# Patient Record
Sex: FEMALE | Race: WHITE | NOT HISPANIC OR LATINO | Employment: UNEMPLOYED | URBAN - METROPOLITAN AREA
[De-identification: names, ages, dates, MRNs, and addresses within clinical notes are randomized per-mention and may not be internally consistent; named-entity substitution may affect disease eponyms.]

---

## 2017-01-04 ENCOUNTER — HOSPITAL ENCOUNTER (EMERGENCY)
Facility: HOSPITAL | Age: 42
Discharge: HOME | End: 2017-01-05
Attending: EMERGENCY MEDICINE
Payer: COMMERCIAL

## 2017-01-04 DIAGNOSIS — K59.09 CHRONIC CONSTIPATION: Primary | ICD-10-CM

## 2017-01-05 VITALS
HEART RATE: 82 BPM | WEIGHT: 230 LBS | TEMPERATURE: 96.8 F | SYSTOLIC BLOOD PRESSURE: 130 MMHG | OXYGEN SATURATION: 99 % | DIASTOLIC BLOOD PRESSURE: 68 MMHG | RESPIRATION RATE: 18 BRPM

## 2017-01-05 LAB — HCG UR QL: NEGATIVE

## 2017-01-05 PROCEDURE — 81025 URINE PREGNANCY TEST: CPT | Performed by: EMERGENCY MEDICINE

## 2017-01-05 PROCEDURE — 99283 EMERGENCY DEPT VISIT LOW MDM: CPT

## 2018-04-26 ENCOUNTER — EVALUATION (OUTPATIENT)
Dept: PHYSICAL THERAPY | Facility: CLINIC | Age: 43
End: 2018-04-26
Payer: COMMERCIAL

## 2018-04-26 DIAGNOSIS — M25.372 UNSTABLE LEFT ANKLE: Primary | ICD-10-CM

## 2018-04-26 PROCEDURE — 97161 PT EVAL LOW COMPLEX 20 MIN: CPT | Performed by: PHYSICAL THERAPIST

## 2018-04-26 PROCEDURE — 97110 THERAPEUTIC EXERCISES: CPT | Performed by: PHYSICAL THERAPIST

## 2018-04-26 PROCEDURE — G8979 MOBILITY GOAL STATUS: HCPCS | Performed by: PHYSICAL THERAPIST

## 2018-04-26 PROCEDURE — G8978 MOBILITY CURRENT STATUS: HCPCS | Performed by: PHYSICAL THERAPIST

## 2018-04-26 RX ORDER — GABAPENTIN 100 MG/1
100 CAPSULE ORAL 3 TIMES DAILY
COMMUNITY
End: 2019-03-30

## 2018-04-26 NOTE — LETTER
2018    Carla Lawrence MD  [address] [address] [place] [id number]    Patient: Royal Lang   YOB: 1975   Date of Visit: 2018     Encounter Diagnosis     ICD-10-CM    1  Unstable left ankle M25 372        Dear Dr Isael Tanner:    Please review the attached Plan of Care from [address] recent visit  Please verify that you agree therapy should continue by signing the attached document and sending it back to our office  If you have any questions or concerns, please don't hesitate to call  Sincerely,    Raghavendra Rios, PT      Referring Provider:      I certify that I have read the below Plan of Care and certify the need for these services furnished under this plan of treatment while under my care  Carla Lawrence MD  [address] [place] [address] [place] [address] [place]: [phone]          PT Evaluation     Today's date: 2018  Patient name: Royal Lang  : 1975  MRN: [id number]  Referring provider: Shaun Martinez MD  Dx: No diagnosis found  Assessment  Impairments: abnormal or restricted ROM, impaired physical strength and pain with function    Assessment details: Pt is a 38 y o  female coming to outpatient PT with L ankle instability s/p L ankle surgery in 2018  Pt presents with decreased L ankle ROM; decreased LLE strength; gait and balance dysfunction; and overall decreased functional mobility  Pt would benefit from skilled PT services in order to address these deficits and reach maximum level of function  Thank you kindly for the referral!     Prognosis: good    Goals  ST  Pt will decrease pain levels by 3 points on NPS in 4 weeks  2  Pt will demonstrate improvement in LLE strength by 1/2 MMT grade in 4 weeks  3  Pt will be independent with HEP in 4 weeks  LT  Achieve FOTO score of 50/100 in 6 weeks  2  Pt will demonstrate non-antalgic gait mechanics in 6 weeks with LRAD     3  Pt will be able to return to work in 6-8 weeks with minimal to no limitations  Plan  Patient would benefit from: skilled PT  Planned modality interventions: cryotherapy  Planned therapy interventions: manual therapy, joint mobilization, neuromuscular re-education, patient education, postural training, therapeutic exercise, therapeutic activities, gait training, functional ROM exercises, balance and activity modification  Frequency: 2x week  Duration in weeks: 6        Subjective Evaluation    History of Present Illness  Date of surgery: 2018  Mechanism of injury: Pt reports she had L ankle surgery (drilled bone)  Pt reports she was NWB for 6 weeks and then went from boot to show, but hasn't bene able to go from boot to shoe  She reports she feels her shin bone feels like it's going to snap  She reports underneath her ankle bone bother her  Pt reports she feels like her foot locks - like she couldn't bend it  Pt has been ambulating with SPC for balance and for pain purposes  Pt has been in boot since  and wants her to keep boot on until next Friday (her next MD appt)  Performs stair climbing nonreciprocally  Pt is unable to work at this time due to her ankle surgery  She does , 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  Pt is on her feet a lot during the day    Pain  At best pain ratin  At worst pain ratin  Relieving factors: rest  Aggravating factors: standing, walking and stair climbing    Social Support  Stairs in house: yes     Treatments  No previous or current treatments  Patient Goals  Patient goals for therapy: decreased pain  Patient goal: to be able to walk        Objective     Observations     Additional Observation Details  Posture: pes planus noted b/l  Gait assessment: antalgic gait; decreased stance time on LLE; decreased heel strike/push off; pes planus; use of SPC  Palpation: increased tender to palpation to L lateral malleolus  Joint assessment: decreased TCJ mobility posterior on L compared to R        Active Range of Motion   Left Ankle/Foot   Dorsiflexion (ke): 5 degrees   Plantar flexion: 40 degrees   Inversion: 30 degrees   Eversion: 10 degrees     Right Ankle/Foot   Dorsiflexion (ke): 10 degrees   Plantar flexion: 32 degrees   Inversion: 30 degrees   Eversion: 20 degrees     Passive Range of Motion   Left Ankle/Foot    Dorsiflexion (ke): 7 degrees   Plantar flexion: 40 degrees   Inversion: 30 degrees   Eversion: 12 degrees     Right Ankle/Foot    Dorsiflexion (ke): 10 degrees   Plantar flexion: 35 degrees   Inversion: 30 degrees   Eversion: 25 degrees     Strength/Myotome Testing     Left Hip   Planes of Motion   Flexion: 4  Extension: 4    Right Hip   Planes of Motion   Flexion: 4  Extension: 4    Left Knee   Flexion: 4  Extension: 4    Right Knee   Flexion: 4  Extension: 4    Left Ankle/Foot   Dorsiflexion: 4  Plantar flexion: 4  Inversion: 4-  Eversion: 4-    Right Ankle/Foot   Dorsiflexion: 5  Plantar flexion: 5  Inversion: 4+  Eversion: 4+    General Comments     Ankle/Foot Comments   Functional tests:    SLS:  R: 5 seconds  L: 2 seconds - difficulty - pes planus noted        Daily Treatment Diary     DX: L ankle instability (s/p L ankle surgery Feb 2018 - in CAM boot until 5/4/18)  EPOC: 6/7/18  Precautions: none  CO-MORBIDITES: none  PERSONAL FACTORS: multiple previous ankle surgeries    Manual  4/26            L Ankle PROM             L TCJ post mobs                                                        Exercise Diary  4/26            Ankle Pumps HEP            Ankle Circles HEP            Ankle ABCs HEP            4-way ankle TB HEP otb            Seated BAPS: DF/PF; INV/EV             Seated HR/TR             Neutral arches             CC: soleus deceleration             SLS CAM boot            Clamshells             Supine bridging             Gastroc stretch: wedge Modalities  4/26            CP

## 2018-04-26 NOTE — PROGRESS NOTES
PT Evaluation     Today's date: 2018  Patient name: Teena Solano  : 1975  MRN: [id number]  Referring provider: Benny Ontiveros MD  Dx: No diagnosis found  Assessment  Impairments: abnormal or restricted ROM, impaired physical strength and pain with function    Assessment details: Pt is a 38 y o  female coming to outpatient PT with L ankle instability s/p L ankle surgery in 2018  Pt presents with decreased L ankle ROM; decreased LLE strength; gait and balance dysfunction; and overall decreased functional mobility  Pt would benefit from skilled PT services in order to address these deficits and reach maximum level of function  Thank you kindly for the referral!     Prognosis: good    Goals  ST  Pt will decrease pain levels by 3 points on NPS in 4 weeks  2  Pt will demonstrate improvement in LLE strength by 1/2 MMT grade in 4 weeks  3  Pt will be independent with HEP in 4 weeks  LT  Achieve FOTO score of 50/100 in 6 weeks  2  Pt will demonstrate non-antalgic gait mechanics in 6 weeks with LRAD  3  Pt will be able to return to work in 6-8 weeks with minimal to no limitations  Plan  Patient would benefit from: skilled PT  Planned modality interventions: cryotherapy  Planned therapy interventions: manual therapy, joint mobilization, neuromuscular re-education, patient education, postural training, therapeutic exercise, therapeutic activities, gait training, functional ROM exercises, balance and activity modification  Frequency: 2x week  Duration in weeks: 6        Subjective Evaluation    History of Present Illness  Date of surgery: 2018  Mechanism of injury: Pt reports she had L ankle surgery (drilled bone)  Pt reports she was NWB for 6 weeks and then went from boot to show, but hasn't bene able to go from boot to shoe  She reports she feels her shin bone feels like it's going to snap  She reports underneath her ankle bone bother her   Pt reports she feels like her foot locks - like she couldn't bend it  Pt has been ambulating with SPC for balance and for pain purposes  Pt has been in boot since  and wants her to keep boot on until next Friday (her next MD appt)  Performs stair climbing nonreciprocally  Pt is unable to work at this time due to her ankle surgery  She does , 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  Pt is on her feet a lot during the day    Pain  At best pain ratin  At worst pain ratin  Relieving factors: rest  Aggravating factors: standing, walking and stair climbing    Social Support  Stairs in house: yes     Treatments  No previous or current treatments  Patient Goals  Patient goals for therapy: decreased pain  Patient goal: to be able to walk        Objective     Observations     Additional Observation Details  Posture: pes planus noted b/l  Gait assessment: antalgic gait; decreased stance time on LLE; decreased heel strike/push off; pes planus; use of SPC  Palpation: increased tender to palpation to L lateral malleolus  Joint assessment: decreased TCJ mobility posterior on L compared to R        Active Range of Motion   Left Ankle/Foot   Dorsiflexion (ke): 5 degrees   Plantar flexion: 40 degrees   Inversion: 30 degrees   Eversion: 10 degrees     Right Ankle/Foot   Dorsiflexion (ke): 10 degrees   Plantar flexion: 32 degrees   Inversion: 30 degrees   Eversion: 20 degrees     Passive Range of Motion   Left Ankle/Foot    Dorsiflexion (ke): 7 degrees   Plantar flexion: 40 degrees   Inversion: 30 degrees   Eversion: 12 degrees     Right Ankle/Foot    Dorsiflexion (ke): 10 degrees   Plantar flexion: 35 degrees   Inversion: 30 degrees   Eversion: 25 degrees     Strength/Myotome Testing     Left Hip   Planes of Motion   Flexion: 4  Extension: 4    Right Hip   Planes of Motion   Flexion: 4  Extension: 4    Left Knee   Flexion: 4  Extension: 4    Right Knee   Flexion: 4  Extension: 4    Left Ankle/Foot Dorsiflexion: 4  Plantar flexion: 4  Inversion: 4-  Eversion: 4-    Right Ankle/Foot   Dorsiflexion: 5  Plantar flexion: 5  Inversion: 4+  Eversion: 4+    General Comments     Ankle/Foot Comments   Functional tests:    SLS:  R: 5 seconds  L: 2 seconds - difficulty - pes planus noted        Daily Treatment Diary     DX: L ankle instability (s/p L ankle surgery Feb 2018 - in CAM boot until 5/4/18)  EPOC: 6/7/18  Precautions: none  CO-MORBIDITES: none  PERSONAL FACTORS: multiple previous ankle surgeries    Manual  4/26            L Ankle PROM             L TCJ post mobs                                                        Exercise Diary  4/26            Ankle Pumps HEP            Ankle Circles HEP            Ankle ABCs HEP            4-way ankle TB HEP otb            Seated BAPS: DF/PF; INV/EV             Seated HR/TR             Neutral arches             CC: soleus deceleration             SLS CAM boot            Clamshells             Supine bridging             Gastroc stretch: wedge                                                                                                                         Modalities  4/26            CP

## 2018-04-27 ENCOUNTER — TRANSCRIBE ORDERS (OUTPATIENT)
Dept: PHYSICAL THERAPY | Facility: CLINIC | Age: 43
End: 2018-04-27

## 2018-04-27 DIAGNOSIS — M25.372 UNSTABLE LEFT ANKLE: Primary | ICD-10-CM

## 2018-05-01 ENCOUNTER — OFFICE VISIT (OUTPATIENT)
Dept: PHYSICAL THERAPY | Facility: CLINIC | Age: 43
End: 2018-05-01
Payer: COMMERCIAL

## 2018-05-01 DIAGNOSIS — M25.372 UNSTABLE LEFT ANKLE: Primary | ICD-10-CM

## 2018-05-01 PROCEDURE — 97140 MANUAL THERAPY 1/> REGIONS: CPT

## 2018-05-01 PROCEDURE — 97112 NEUROMUSCULAR REEDUCATION: CPT

## 2018-05-01 PROCEDURE — 97110 THERAPEUTIC EXERCISES: CPT

## 2018-05-01 NOTE — PROGRESS NOTES
Daily Note     Today's date: 2018  Patient name: Teresa Morris  : 1975  MRN: [id number]  Referring provider: Dillon Abbasi MD  Dx:   Encounter Diagnosis     ICD-10-CM    1  Unstable left ankle M25 372                   Subjective: Pt  Stated she is doing ok while ambulating in the boot, but she feels she has to shift her weight and keep it on her heel because when she WB through front of her foot she states it feels like a bone is going to break  Objective: See treatment diary below      Assessment: Tolerated treatment well  Patient exhibited good technique with therapeutic exercises and would benefit from continued PT  Pt  Strength in L ankle seems strong considering she has been in boot for length of time, but she is very diligent with HEP  ROM still limited  During EV stretching of L ankle pt  Gets a burning sensation  Tolerated TE's well, but pain with SLS if weight is towards forefoot or toes, she feels she has to WB on heel  Plan: Continue per plan of care  Progress treatment as tolerated        Daily Treatment Diary     DX: L ankle instability (s/p L ankle surgery 2018 - in CAM boot until 18)  EPOC: 18  Precautions: none  CO-MORBIDITES: none  PERSONAL FACTORS: multiple previous ankle surgeries    Manual             L Ankle PROM  10'           L TCJ post mobs  np                                                      Exercise Diary             Ankle Pumps HEP            Ankle Circles HEP            Ankle ABCs HEP reviewed           4-way ankle TB HEP otb gtb  15  ea           Seated BAPS: DF/PF; INV/EV  CW/CCW  15  Ea  L3           Seated HR/TR  20           Neutral arches  5"  x15           CC: soleus deceleration  np           SLS CAM boot np           Clamshells  otb  15x           Supine bridging  5"  x15           Gastroc stretch: wedge Modalities  4/26            CP

## 2018-05-03 ENCOUNTER — OFFICE VISIT (OUTPATIENT)
Dept: PHYSICAL THERAPY | Facility: CLINIC | Age: 43
End: 2018-05-03
Payer: COMMERCIAL

## 2018-05-03 DIAGNOSIS — M25.372 UNSTABLE LEFT ANKLE: Primary | ICD-10-CM

## 2018-05-03 PROCEDURE — 97110 THERAPEUTIC EXERCISES: CPT

## 2018-05-03 PROCEDURE — 97140 MANUAL THERAPY 1/> REGIONS: CPT

## 2018-05-03 PROCEDURE — 97112 NEUROMUSCULAR REEDUCATION: CPT

## 2018-05-03 NOTE — PROGRESS NOTES
Daily Note     Today's date: 5/3/2018  Patient name: Grace Gomez  : 1975  MRN: [id number]  Referring provider: Mitch Olson MD  Dx:   Encounter Diagnosis     ICD-10-CM    1  Unstable left ankle M25 372                   Subjective: Pt  Stated she is doing ok while ambulating in the boot, but she feels she has to shift her weight and keep it on her heel because when she WB through front of her foot she states it feels like a bone is going to break  Objective: See treatment diary below      Assessment: Tolerated treatment well  Patient exhibited good technique with therapeutic exercises and would benefit from continued PT  Pt  continues to show strength and range improvements  Pt  Tolerated TE's with minimal difficulty and no discomfort throughout, except for some burning with SLS in CAM boot  Will continue to progress as tolerated and monitor pt  response  Pt  To have follow up appointment with surgeon tomorrow  Plan: Continue per plan of care  Progress treatment as tolerated        Daily Treatment Diary     DX: L ankle instability (s/p L ankle surgery 2018 - in CAM boot until 18)  EPOC: 18  Precautions: none  CO-MORBIDITES: none  PERSONAL FACTORS: multiple previous ankle surgeries    Manual   53          L Ankle PROM  10' 9'          L TCJ post mobs  np np                                                     Exercise Diary  4/26 5/1 5/3          Bike   5'          Ankle Pumps HEP            Ankle Circles HEP            Ankle ABCs HEP reviewed full          4-way ankle TB HEP otb gtb  15  ea gtb  20 ea          Seated BAPS: DF/PF; INV/EV  CW/CCW  15  Ea  L3 20 ea  L4          Seated HR/TR  20 20          Neutral arches  5"  x15 10"x10          CC: soleus deceleration  np nv          SLS CAM boot np 10"x5          Clamshells  otb  15x otb  20x          Supine bridging  5"  x15 5"x15          Gastroc stretch: wedge Modalities  4/26            CP

## 2018-05-04 ENCOUNTER — APPOINTMENT (OUTPATIENT)
Dept: PHYSICAL THERAPY | Facility: CLINIC | Age: 43
End: 2018-05-04
Payer: COMMERCIAL

## 2018-05-08 ENCOUNTER — OFFICE VISIT (OUTPATIENT)
Dept: PHYSICAL THERAPY | Facility: CLINIC | Age: 43
End: 2018-05-08
Payer: COMMERCIAL

## 2018-05-08 DIAGNOSIS — M25.372 UNSTABLE LEFT ANKLE: Primary | ICD-10-CM

## 2018-05-08 PROCEDURE — 97140 MANUAL THERAPY 1/> REGIONS: CPT

## 2018-05-08 PROCEDURE — 97110 THERAPEUTIC EXERCISES: CPT

## 2018-05-08 PROCEDURE — 97112 NEUROMUSCULAR REEDUCATION: CPT

## 2018-05-08 NOTE — PROGRESS NOTES
Daily Note     Today's date: 2018  Patient name: Gary Flood  : 1975  MRN: [id number]  Referring provider: Jonny Rdz MD  Dx:   Encounter Diagnosis     ICD-10-CM    1  Unstable left ankle M25 372                   Subjective: Pt  Stated she has been more sore the last two days or so after MD d/c use of CAM boot and allowed to to progress to WB as tolerated  Objective: See treatment diary below      Assessment: Tolerated treatment fair  Patient exhibited good technique with therapeutic exercises and would benefit from continued PT  Pt  Complains of pain and soreness today  Able to complete TE's but with some discomfort t/o  Continued with Strengthening TE's and did not progress WB in session due to the increased pain when WB  Advised pt  That she should ween off of boot and still use if walking longer distanced for now, after experiencing the increased pain when out of boot  Pt  To have bone scan today ordered by MD    Plan: Continue per plan of care  Progress treatment as tolerated        Daily Treatment Diary     DX: L ankle instability (s/p L ankle surgery 2018 - in CAM boot until 18)  EPOC: 18  Precautions: none  CO-MORBIDITES: none  PERSONAL FACTORS: multiple previous ankle surgeries    Manual  4/26 5/1 5/3 5/8         L Ankle PROM  10' 9' 9'         L TCJ post mobs  np np np                                                    Exercise Diary  4/26 5/1 5/3 5/8         Bike   5' 5'         Ankle Pumps HEP            Ankle Circles HEP            Ankle ABCs HEP reviewed full          4-way ankle TB HEP otb gtb  15  ea gtb  20 ea btb  20  ea         Seated BAPS: DF/PF; INV/EV  CW/CCW  15  Ea  L3 20 ea  L4 20 ea  L4         Seated HR/TR  20 20 20         Neutral arches  5"  x15 10"x10 5"x15         CC: soleus deceleration  np nv 2W  L1  x10         SLS CAM boot np 10"x5 np         Clamshells  otb  15x otb  20x gtb  10         Supine bridging  5"  x15 5"x15 5"x15         Gastroc stretch: wedge   5"x10 5"x10                                                                                                                     Modalities  4/26            CP

## 2018-05-10 ENCOUNTER — APPOINTMENT (OUTPATIENT)
Dept: PHYSICAL THERAPY | Facility: CLINIC | Age: 43
End: 2018-05-10
Payer: COMMERCIAL

## 2018-05-14 ENCOUNTER — OFFICE VISIT (OUTPATIENT)
Dept: PHYSICAL THERAPY | Facility: CLINIC | Age: 43
End: 2018-05-14
Payer: COMMERCIAL

## 2018-05-14 DIAGNOSIS — M25.372 UNSTABLE LEFT ANKLE: Primary | ICD-10-CM

## 2018-05-14 PROCEDURE — 97112 NEUROMUSCULAR REEDUCATION: CPT

## 2018-05-14 PROCEDURE — 97140 MANUAL THERAPY 1/> REGIONS: CPT

## 2018-05-14 PROCEDURE — 97110 THERAPEUTIC EXERCISES: CPT

## 2018-05-14 NOTE — PROGRESS NOTES
Daily Note     Today's date: 2018  Patient name: Ebenezer Coleman  : 1975  MRN: [id number]  Referring provider: Tena Franco MD  Dx:   Encounter Diagnosis     ICD-10-CM    1  Unstable left ankle M25 372                   Subjective: Pt  Stated she has been more sore the last two days or so after MD d/c use of CAM boot and allowed to to progress to WB as tolerated  Objective: See treatment diary below      Assessment: Tolerated treatment fair  Patient exhibited good technique with therapeutic exercises and would benefit from continued PT  Pt  Complains of pain and soreness today  Able to complete TE's but with some discomfort t/o  Continued with Strengthening TE's and  progressed WB activities with fair tolerance  Pain usually occurs on lateral aspect of L ankle with WB, mostly during toe off  Will speak with PT about assessing fibular head movement upon NV  Plan: Continue per plan of care  Progress treatment as tolerated        Daily Treatment Diary     DX: L ankle instability (s/p L ankle surgery 2018 - in CAM boot until 18)  EPOC: 18  Precautions: none  CO-MORBIDITES: none  PERSONAL FACTORS: multiple previous ankle surgeries    Manual  4/26 5/1 5/3 5/8 5/14        L Ankle PROM  10' 9' 9' 8'        L TCJ post mobs  np np np                                                    Exercise Diary  4/26 5/1 5/3 5/8 5/14        Bike   5' 5' 6'        Ankle Pumps HEP            Ankle Circles HEP            Ankle ABCs HEP reviewed full          4-way ankle TB HEP otb gtb  15  ea gtb  20 ea btb  20  ea btb  20ea        Seated BAPS: DF/PF; INV/EV  CW/CCW  15  Ea  L3 20 ea  L4 20 ea  L4 20 ea  L4        Seated HR/TR  20 20 20 Stand  x15        Neutral arches  5"  x15 10"x10 5"x15 10"  x5        CC: soleus deceleration  np nv 2W  L1  x10 2W  L1  x25        SLS CAM boot np 10"x5 np 5"x5        Clamshells  otb  15x otb  20x gtb  10         Supine bridging  5"  x15 5"x15 5"x15         Gastroc stretch: wedge   5"x10 5"x10 15"x3        SLS     5"x5        Tandem walk     2 laps        Sidestepping     2 laps        Step ups fwd/lat     Up 6"  Lat 4"  x10        Step downs     4"  x10                                                   Modalities  4/26            CP

## 2018-05-17 ENCOUNTER — OFFICE VISIT (OUTPATIENT)
Dept: PHYSICAL THERAPY | Facility: CLINIC | Age: 43
End: 2018-05-17
Payer: COMMERCIAL

## 2018-05-17 DIAGNOSIS — M25.372 UNSTABLE LEFT ANKLE: Primary | ICD-10-CM

## 2018-05-17 PROCEDURE — 97110 THERAPEUTIC EXERCISES: CPT | Performed by: PHYSICAL THERAPIST

## 2018-05-17 PROCEDURE — 97112 NEUROMUSCULAR REEDUCATION: CPT | Performed by: PHYSICAL THERAPIST

## 2018-05-17 PROCEDURE — 97140 MANUAL THERAPY 1/> REGIONS: CPT | Performed by: PHYSICAL THERAPIST

## 2018-05-17 NOTE — PROGRESS NOTES
Daily Note     Today's date: 2018  Patient name: Mariella Teran  : 1975  MRN: [id number]  Referring provider: Bhanu Scruggs MD  Dx:   Encounter Diagnosis     ICD-10-CM    1  Unstable left ankle M25 372                   Subjective: Pt reports continuing to wear CAM boot at home per MD, but not here  She reports she just came form the gym - doing upper body exercises  She reports that she is feeling better today  Objective: See treatment diary below      Assessment: Tolerated treatment fair  Patient exhibited good technique with therapeutic exercises and would benefit from continued PT  performed TE with better tolerance during today's session  Continues to be limited in ankle ROM, but improving  Requires VC to maintaining neutral foot with frontal plane activities as pt has tendency to turn foot into ER  To see MD next Friday  Pt reported feeling pretty good leaving session  Plan: Continue per plan of care  Progress treatment as tolerated        Daily Treatment Diary     DX: L ankle instability (s/p L ankle surgery 2018 - in CAM boot outside of PT)  EPOC: 18  Precautions: none  CO-MORBIDITES: none  PERSONAL FACTORS: multiple previous ankle surgeries    Manual  4/26 5/1 5/3 5/8 5/14 5/17       L Ankle PROM  10' 9' 9' 8' 8'       L TCJ post mobs  np np np  2'                                                  Exercise Diary  4/26 5/1 5/3 5/8 5/14 5/17       Bike   5' 5' 6' 5'       Ankle Pumps HEP            Ankle Circles HEP            Ankle ABCs HEP reviewed full          4-way ankle TB HEP otb gtb  15  ea gtb  20 ea btb  20  ea btb  20ea btb  20ea       Seated BAPS: DF/PF; INV/EV  CW/CCW  15  Ea  L3 20 ea  L4 20 ea  L4 20 ea  L4 20 ea  L4       Seated HR/TR  20 20 20 Stand  x15 Stand  x15       Neutral arches  5"  x15 10"x10 5"x15 10"  x5 10"  x5       CC: soleus deceleration  np nv 2W  L1  x10 2W  L1  x25 2W  L1  x25       SLS CAM boot np 10"x5 np 5"x5 5"x5       Clamshells  otb  15x otb  20x gtb  10         Supine bridging  5"  x15 5"x15 5"x15         Gastroc stretch: wedge   5"x10 5"x10 15"x3 15"x3       Tandem walk     2 laps 2 laps       Sidestepping     2 laps 2 laps       Step ups fwd/lat     Up 6"  Lat 4"  x10 Up 6" lat 4" x10       Step downs     4"  x10 4" x10                                                  Modalities  4/26            CP

## 2018-05-22 ENCOUNTER — OFFICE VISIT (OUTPATIENT)
Dept: PHYSICAL THERAPY | Facility: CLINIC | Age: 43
End: 2018-05-22
Payer: COMMERCIAL

## 2018-05-22 DIAGNOSIS — M25.372 UNSTABLE LEFT ANKLE: Primary | ICD-10-CM

## 2018-05-22 PROCEDURE — 97140 MANUAL THERAPY 1/> REGIONS: CPT

## 2018-05-22 PROCEDURE — 97110 THERAPEUTIC EXERCISES: CPT

## 2018-05-22 PROCEDURE — 97112 NEUROMUSCULAR REEDUCATION: CPT

## 2018-05-22 NOTE — PROGRESS NOTES
Daily Note     Today's date: 2018  Patient name: Kat Ayala  : 1975  MRN: [id number]  Referring provider: Cata Garnett MD  Dx:   Encounter Diagnosis     ICD-10-CM    1  Unstable left ankle M25 372                   Subjective: Pt reports that she has had increased pain since last visit, which is starting to fade but still painful with WB even in CAM boot  Objective: See treatment diary below      Assessment: Tolerated treatment fair  Patient exhibited good technique with therapeutic exercises and would benefit from continued PT  Held WB exercises today, due to the increased pain still lingering since last visit  Continued and progressed seated and supine TE's and worked on PROM  JA PT performed mobilizations and reports restriction and pain of lateral talus glide and medial calcaneus glide  Plan: Continue per plan of care  Progress treatment as tolerated        Daily Treatment Diary     DX: L ankle instability (s/p L ankle surgery 2018 - in CAM boot outside of PT)  EPOC: 18  Precautions: none  CO-MORBIDITES: none  PERSONAL FACTORS: multiple previous ankle surgeries    Manual  4/26 5/1 5/3 5/8 5/14 5/17 5/22      L Ankle PROM  10' 9' 9' 8' 8' 8'      L TCJ post mobs  np np np  2' JA  PT                                                 Exercise Diary  4/26 5/1 5/3 5/8 5/14 5/17 5/22      Bike   5' 5' 6' 5' 6'      Ankle Pumps HEP            Ankle Circles HEP            Ankle ABCs HEP reviewed full          4-way ankle TB HEP otb gtb  15  ea gtb  20 ea btb  20  ea btb  20ea btb  20ea btb  25 ea      Seated BAPS: DF/PF; INV/EV  CW/CCW  15  Ea  L3 20 ea  L4 20 ea  L4 20 ea  L4 20 ea  L4 20 ea  L5      Seated HR/TR  20 20 20 Stand  x15 Stand  x15 Seat  x20      Neutral arches  5"  x15 10"x10 5"x15 10"  x5 10"  x5 10"  x10      CC: soleus deceleration  np nv 2W  L1  x10 2W  L1  x25 2W  L1  x25       SLS CAM boot np 10"x5 np 5"x5 5"x5       Clamshells  otb  15x otb  20x gtb  10 Supine bridging  5"  x15 5"x15 5"x15         Gastroc stretch: wedge   5"x10 5"x10 15"x3 15"x3       Tandem walk     2 laps 2 laps       Sidestepping     2 laps 2 laps       Step ups fwd/lat     Up 6"  Lat 4"  x10 Up 6" lat 4" x10       Step downs     4"  x10 4" x10                                                  Modalities  4/26            CP

## 2018-05-24 ENCOUNTER — OFFICE VISIT (OUTPATIENT)
Dept: PHYSICAL THERAPY | Facility: CLINIC | Age: 43
End: 2018-05-24
Payer: COMMERCIAL

## 2018-05-24 DIAGNOSIS — M25.372 UNSTABLE LEFT ANKLE: Primary | ICD-10-CM

## 2018-05-24 PROCEDURE — 97112 NEUROMUSCULAR REEDUCATION: CPT | Performed by: PHYSICAL THERAPIST

## 2018-05-24 PROCEDURE — G8978 MOBILITY CURRENT STATUS: HCPCS | Performed by: PHYSICAL THERAPIST

## 2018-05-24 PROCEDURE — 97140 MANUAL THERAPY 1/> REGIONS: CPT | Performed by: PHYSICAL THERAPIST

## 2018-05-24 PROCEDURE — 97110 THERAPEUTIC EXERCISES: CPT | Performed by: PHYSICAL THERAPIST

## 2018-05-24 PROCEDURE — G8979 MOBILITY GOAL STATUS: HCPCS | Performed by: PHYSICAL THERAPIST

## 2018-05-24 NOTE — PROGRESS NOTES
Daily Note / Discharge  *patient never returned to PT - patient D/C from PT*    Today's date: 2018  Patient name: Luz Persaud  : 1975  MRN: [id number]  Referring provider: Lisha Martinez MD  Dx:   Encounter Diagnosis     ICD-10-CM    1  Unstable left ankle M25 [id number]                   Subjective: Pt reports that she has been in more pain these last 2 weeks than prior to surgery  She reports that she pretty much wore her CAM boot all day yesterday  She reports that she is frustrated and feels like her surgery should be healing by now  Pt to see MD tomorrow to discuss  Reports most pain in achilles  Objective: See treatment diary below      Assessment: Tolerated treatment fair  Patient exhibited good technique with therapeutic exercises and would benefit from continued PT  Held WB exercises today, due to the increased pain in achilles  Tolerated seated TE well  Trialed IASTM over L achilles and gastroc with relief noted post  Monitor response nv  Plan: Continue per plan of care  Progress treatment as tolerated        Daily Treatment Diary     DX: L ankle instability (s/p L ankle surgery 2018 - in CAM boot outside of PT)  EPOC: 18  Precautions: none  CO-MORBIDITES: none  PERSONAL FACTORS: multiple previous ankle surgeries    Manual  4/26 5/1 5/3 5/8 5/14 5/17 5/22 5/24     L Ankle PROM  10' 9' 9' 8' 8' 8' 5'     L TCJ post mobs  np np np  2' JA  PT 3'     IASTM to L achilles        5'                                   Exercise Diary   5/3 5/8 5/14 5/17 5/22 5/24     Bike   5' 5' 6' 5' 6' 6'     Ankle Pumps HEP            Ankle Circles HEP            Ankle ABCs HEP reviewed full          4-way ankle TB HEP otb gtb  15  ea gtb  20 ea btb  20  ea btb  20ea btb  20ea btb  25 ea btb 25 ea     Seated BAPS: DF/PF; INV/EV  CW/CCW  15  Ea  L3 20 ea  L4 20 ea  L4 20 ea  L4 20 ea  L4 20 ea  L5 20 ea L5     Seated HR/TR  20 20 20 Stand  x15 Stand  x15 Seat  x20 seated x20     Neutral arches 5"  x15 10"x10 5"x15 10"  x5 10"  x5 10"  x10 10x10"     CC: soleus deceleration  np nv 2W  L1  x10 2W  L1  x25 2W  L1  x25       SLS CAM boot np 10"x5 np 5"x5 5"x5       Clamshells  otb  15x otb  20x gtb  10         Supine bridging  5"  x15 5"x15 5"x15         Gastroc stretch: wedge   5"x10 5"x10 15"x3 15"x3       Tandem walk     2 laps 2 laps       Sidestepping     2 laps 2 laps       Step ups fwd/lat     Up 6"  Lat 4"  x10 Up 6" lat 4" x10       Step downs     4"  x10 4" x10                                                  Modalities  4/26            CP

## 2018-05-29 ENCOUNTER — APPOINTMENT (OUTPATIENT)
Dept: PHYSICAL THERAPY | Facility: CLINIC | Age: 43
End: 2018-05-29
Payer: COMMERCIAL

## 2018-05-31 ENCOUNTER — APPOINTMENT (OUTPATIENT)
Dept: PHYSICAL THERAPY | Facility: CLINIC | Age: 43
End: 2018-05-31
Payer: COMMERCIAL

## 2019-02-02 ENCOUNTER — HOSPITAL ENCOUNTER (EMERGENCY)
Facility: HOSPITAL | Age: 44
Discharge: HOME | End: 2019-02-02
Attending: EMERGENCY MEDICINE | Admitting: EMERGENCY MEDICINE
Payer: COMMERCIAL

## 2019-02-02 ENCOUNTER — APPOINTMENT (EMERGENCY)
Dept: RADIOLOGY | Facility: HOSPITAL | Age: 44
End: 2019-02-02
Payer: COMMERCIAL

## 2019-02-02 VITALS
TEMPERATURE: 98.8 F | HEIGHT: 64 IN | HEART RATE: 94 BPM | BODY MASS INDEX: 41.83 KG/M2 | WEIGHT: 245 LBS | DIASTOLIC BLOOD PRESSURE: 87 MMHG | SYSTOLIC BLOOD PRESSURE: 135 MMHG | OXYGEN SATURATION: 98 % | RESPIRATION RATE: 16 BRPM

## 2019-02-02 DIAGNOSIS — S40.021A CONTUSION OF RIGHT UPPER EXTREMITY, INITIAL ENCOUNTER: Primary | ICD-10-CM

## 2019-02-02 PROCEDURE — 73130 X-RAY EXAM OF HAND: CPT

## 2019-02-02 PROCEDURE — 73110 X-RAY EXAM OF WRIST: CPT

## 2019-02-02 PROCEDURE — 99283 EMERGENCY DEPT VISIT LOW MDM: CPT

## 2019-02-02 PROCEDURE — 73090 X-RAY EXAM OF FOREARM: CPT

## 2019-02-02 RX ADMIN — IBUPROFEN 600 MG: 400 TABLET ORAL at 10:28

## 2019-02-02 NOTE — DISCHARGE INSTRUCTIONS
Contusion in Adults, Ambulatory Care   GENERAL INFORMATION:   A contusion  is a bruise that appears on your skin after an injury  A bruise happens when small blood vessels tear but skin does not  When blood vessels tear, blood leaks into nearby tissue, such as soft tissue or muscle  Common symptoms include the following:   · Pain that increases when you touch the bruise, walk, or use the area around the bruise    · Swelling or a lump at the site of the bruise or near it    · Red, blue, or black skin that may change to green or yellow after a few days    · Stiffness or problems moving the bruised area of your body  Seek immediate care for the following symptoms:   · New difficulty moving your injured area    · Tingling or numbness in or near the injured area    · Hand or foot below the bruise gets cold or turns pale  Treatment for a contusion  may include any of the following:  · NSAIDs  help decrease swelling and pain or fever  This medicine is available with or without a doctor's order  NSAIDs can cause stomach bleeding or kidney problems in certain people  If you take blood thinner medicine, always ask your healthcare provider if NSAIDs are safe for you  Always read the medicine label and follow directions  · Pain medicine  to decrease or take away pain  Do not wait until the pain is severe before you take your medicine  · Aspiration  to drain pooled blood in your muscle may be done to help prevent increased pressure in the muscle  · Surgery  may be done to repair a tear in the muscle or relieve pressure in the muscle caused by swelling  Care for a contusion:   · Rest the injured area  or use it less than usual  If you bruised your leg or foot, you may need crutches or a cane to help you walk  This will help you keep weight off your injured body part  Use crutches or a cane as directed  · Use ice  to decrease swelling and pain  Ice may also help prevent tissue damage   Use an ice pack, or put crushed ice in a plastic bag  Cover it with a towel and place it on your bruise for 15 to 20 minutes every hour or as directed  · Use Compression  An elastic bandage may be wrapped around a bruised muscle to support the area and decrease swelling  Make sure the bandage is not too tight  You should be able to fit 1 finger between the bandage and your skin  · Elevate (raise) your injured body part  above the level of your heart to help decrease pain and swelling  Use pillows, blankets, or rolled towels to elevate the area as often as you can  · Do not massage or use heat  Heat and massage may slow healing of the area  · Do not drink alcohol  Alcohol may slow healing of your injury  · Do not stretch injured muscles  Ask your healthcare provider when and how you may safely stretch after your injury  Prevent a contusion:   · Stretch and warm up before you play sports or exercise  · Wear protective gear when you play sports  Examples are shin guards and padding  · If you begin a new physical activity, start slowly to give your body a chance to adjust   Follow up with your healthcare provider as directed:  Write down your questions so you remember to ask them during your visits  CARE AGREEMENT:   You have the right to help plan your care  Learn about your health condition and how it may be treated  Discuss treatment options with your caregivers to decide what care you want to receive  You always have the right to refuse treatment  The above information is an  only  It is not intended as medical advice for individual conditions or treatments  Talk to your doctor, nurse or pharmacist before following any medical regimen to see if it is safe and effective for you  © 2014 [address] is for End User's use only and may not be sold, redistributed or otherwise used for commercial purposes   All illustrations and images included in CareNotes® are the copyrighted property of [employer]  or Will Cha

## 2019-02-02 NOTE — ED PROVIDER NOTES
History  Chief Complaint   Patient presents with    Hand Injury     fall around 5 Pm yesterday, landed on the right arm and having pain in the right hand  64-year-old female presents for evaluation right hand and wrist pain status post fall 1 day prior  Patient reports around 5:00 p m  Slipping on ice and falling directly onto her right hand and forearm  Patient out swelling of the lateral aspect of the right hand and pain of the wrist and forearm  Pain is currently 7/10  No paresthesias, no other injury noted, no head contusion or LOC  History provided by:  Patient   used: No        Prior to Admission Medications   Prescriptions Last Dose Informant Patient Reported? Taking? Celecoxib (CELEBREX PO) Not Taking at Unknown time  Yes No   Sig: Take by mouth   gabapentin (NEURONTIN) 100 mg capsule Not Taking at Unknown time  Yes No   Sig: Take 100 mg by mouth 3 (three) times a day      Facility-Administered Medications: None       History reviewed  No pertinent past medical history  Past Surgical History:   Procedure Laterality Date    ANKLE ARTHROSCOPY      had 4 ankle surgeries on L foot; 2010; Oct 2017; Feb 2018    ANKLE LIGAMENT RECONSTRUCTION Right     CHOLECYSTECTOMY      HEEL SPUR EXCISION Right 2007    TUBAL LIGATION         Family History   Problem Relation Age of Onset    Cancer Father      I have reviewed and agree with the history as documented  Social History   Substance Use Topics    Smoking status: Current Every Day Smoker     Packs/day: 0 25    Smokeless tobacco: Never Used    Alcohol use No        Review of Systems   Constitutional: Negative for chills and fever  HENT: Negative for rhinorrhea and sore throat  Eyes: Negative for visual disturbance  Respiratory: Negative for cough and shortness of breath  Cardiovascular: Negative for chest pain and leg swelling  Gastrointestinal: Negative for abdominal pain, diarrhea, nausea and vomiting  Genitourinary: Negative for dysuria  Musculoskeletal: Negative for back pain and myalgias  Right hand, forearm and wrist pain   Skin: Negative for rash  Neurological: Negative for dizziness and headaches  Psychiatric/Behavioral: Negative for confusion  All other systems reviewed and are negative  Physical Exam  Physical Exam   Constitutional: She is oriented to person, place, and time  She appears well-developed and well-nourished  HENT:   Nose: Nose normal    Mouth/Throat: Oropharynx is clear and moist  No oropharyngeal exudate  Eyes: Pupils are equal, round, and reactive to light  Conjunctivae and EOM are normal  No scleral icterus  Neck: Normal range of motion  Neck supple  No JVD present  No tracheal deviation present  Cardiovascular: Normal rate, regular rhythm and normal heart sounds  No murmur heard  Pulmonary/Chest: Effort normal and breath sounds normal  No respiratory distress  She has no wheezes  She has no rales  Abdominal: Soft  Bowel sounds are normal  There is no tenderness  There is no guarding  Musculoskeletal: Normal range of motion  She exhibits edema and tenderness (Right lateral hand, wrist and forearm)  Neurological: She is alert and oriented to person, place, and time  No cranial nerve deficit or sensory deficit  She exhibits normal muscle tone  5/5 motor, nl sens   Skin: Skin is warm and dry  Psychiatric: She has a normal mood and affect  Her behavior is normal    Nursing note and vitals reviewed        Vital Signs  ED Triage Vitals [02/02/19 0953]   Temperature Pulse Respirations Blood Pressure SpO2   98 8 °F (37 1 °C) 94 16 135/87 98 %      Temp Source Heart Rate Source Patient Position - Orthostatic VS BP Location FiO2 (%)   Tympanic -- Sitting Left arm --      Pain Score       5           Vitals:    02/02/19 0953   BP: 135/87   Pulse: 94   Patient Position - Orthostatic VS: Sitting       Visual Acuity      ED Medications  Medications   ibuprofen (MOTRIN) tablet 600 mg (600 mg Oral Given 2/2/19 1028)       Diagnostic Studies  Results Reviewed     None                 XR forearm 2 views RIGHT   ED Interpretation by Kerney Sandhoff, DO (02/02 1027)   No fracture or dislocation      XR hand 3+ views RIGHT   ED Interpretation by Kerney Sandhoff, DO (02/02 1027)   No fracture or dislocation      XR wrist 3+ views RIGHT   ED Interpretation by Kerney Sandhoff, DO (02/02 1027)   no fracture or dislocation                 Procedures  Procedures       Phone Contacts  ED Phone Contact    ED Course  ED Course as of Feb 02 1041   Sat Feb 02, 2019   1002 Patient seen examined at bedside  Imaging ordered  1026 Imaging reviewed, no acute fracture dislocation noted  Motrin 600 mg given  Instructed patient on rest, ice, elevation and NSAIDs as needed  1027 Discussed with patient discharge plan and instructions  Patient to follow up with primary care physician as an outpatient  Patient to return to ED if any change or worsening condition: increased pain, new onset fever or bleeding  MDM    Disposition  Final diagnoses:   Contusion of right upper extremity, initial encounter     Time reflects when diagnosis was documented in both MDM as applicable and the Disposition within this note     Time User Action Codes Description Comment    2/2/2019 10:28 AM Freddy Velasco Add [S40 021A] Contusion of right upper extremity, initial encounter       ED Disposition     ED Disposition Condition Date/Time Comment    Discharge  Sat Feb 2, 2019 10:28 AM Kylah Cameron discharge to home/self care      Condition at discharge: Stable        Follow-up Information     Follow up With Specialties Details Why Contact Info    PMD  In 2 days      [address] Emergency Department Emergency Medicine  As needed, If symptoms worsen [address] [facility]  [phone]          Discharge Medication List as of 2/2/2019 10:29 AM CONTINUE these medications which have NOT CHANGED    Details   Celecoxib (CELEBREX PO) Take by mouth, Historical Med      gabapentin (NEURONTIN) 100 mg capsule Take 100 mg by mouth 3 (three) times a day, Historical Med           No discharge procedures on file      ED Provider  Electronically Signed by           Darline Russell DO  02/02/19 1047

## 2019-02-18 ENCOUNTER — APPOINTMENT (EMERGENCY)
Dept: RADIOLOGY | Facility: HOSPITAL | Age: 44
End: 2019-02-18
Payer: COMMERCIAL

## 2019-02-18 ENCOUNTER — HOSPITAL ENCOUNTER (EMERGENCY)
Facility: HOSPITAL | Age: 44
Discharge: HOME | End: 2019-02-18
Attending: EMERGENCY MEDICINE | Admitting: EMERGENCY MEDICINE
Payer: COMMERCIAL

## 2019-02-18 VITALS
HEIGHT: 64 IN | OXYGEN SATURATION: 98 % | RESPIRATION RATE: 16 BRPM | BODY MASS INDEX: 41.83 KG/M2 | SYSTOLIC BLOOD PRESSURE: 132 MMHG | TEMPERATURE: 99.3 F | HEART RATE: 86 BPM | DIASTOLIC BLOOD PRESSURE: 82 MMHG | WEIGHT: 245 LBS

## 2019-02-18 DIAGNOSIS — J40 BRONCHITIS: Primary | ICD-10-CM

## 2019-02-18 DIAGNOSIS — R05.9 COUGH: ICD-10-CM

## 2019-02-18 LAB — EXT PREG TEST URINE: NEGATIVE

## 2019-02-18 PROCEDURE — 94760 N-INVAS EAR/PLS OXIMETRY 1: CPT

## 2019-02-18 PROCEDURE — 99285 EMERGENCY DEPT VISIT HI MDM: CPT

## 2019-02-18 PROCEDURE — 94640 AIRWAY INHALATION TREATMENT: CPT

## 2019-02-18 PROCEDURE — 71046 X-RAY EXAM CHEST 2 VIEWS: CPT

## 2019-02-18 PROCEDURE — 81025 URINE PREGNANCY TEST: CPT | Performed by: EMERGENCY MEDICINE

## 2019-02-18 RX ORDER — IPRATROPIUM BROMIDE AND ALBUTEROL SULFATE 2.5; .5 MG/3ML; MG/3ML
3 SOLUTION RESPIRATORY (INHALATION) ONCE
Status: COMPLETED | OUTPATIENT
Start: 2019-02-18 | End: 2019-02-18

## 2019-02-18 RX ORDER — BENZONATATE 200 MG/1
200 CAPSULE ORAL 3 TIMES DAILY PRN
Qty: 20 CAPSULE | Refills: 0 | Status: SHIPPED | OUTPATIENT
Start: 2019-02-18 | End: 2019-03-30

## 2019-02-18 RX ORDER — LEVOFLOXACIN 500 MG/1
500 TABLET, FILM COATED ORAL DAILY
Qty: 10 TABLET | Refills: 0 | Status: SHIPPED | OUTPATIENT
Start: 2019-02-18 | End: 2019-02-28

## 2019-02-18 RX ADMIN — IPRATROPIUM BROMIDE AND ALBUTEROL SULFATE 3 ML: .5; 3 SOLUTION RESPIRATORY (INHALATION) at 14:13

## 2019-02-18 NOTE — ED PROVIDER NOTES
History  Chief Complaint   Patient presents with    Shortness of Breath     was dx with pneumonia one month ago given prednisone, zithromax and albuterol inhaler   Cough     dry, sore throat from coughing  worsened two days ago      Patient reports shortness of breath coughing and possible pneumonia  Patient was seen and treated clinically for pneumonia approximately 1 month ago at the that the mycin on steroids  Patient is felt to get better and in fact in the last 2 days has gotten worse with coughing and shortness of breath and wheezing  Patient is a smoker and has slowed her smoking since becoming ill  History provided by:  Patient      Prior to Admission Medications   Prescriptions Last Dose Informant Patient Reported? Taking? Celecoxib (CELEBREX PO) Not Taking at Unknown time  Yes No   Sig: Take by mouth   gabapentin (NEURONTIN) 100 mg capsule Not Taking at Unknown time  Yes No   Sig: Take 100 mg by mouth 3 (three) times a day      Facility-Administered Medications: None       Past Medical History:   Diagnosis Date    Bronchitis     Pneumonia        Past Surgical History:   Procedure Laterality Date    ANKLE ARTHROSCOPY      had 4 ankle surgeries on L foot; 2010; Oct 2017; Feb 2018    ANKLE LIGAMENT RECONSTRUCTION Right     CHOLECYSTECTOMY      HEEL SPUR EXCISION Right 2007    TUBAL LIGATION         Family History   Problem Relation Age of Onset    Cancer Father      I have reviewed and agree with the history as documented  Social History     Tobacco Use    Smoking status: Current Every Day Smoker     Packs/day: 0 25    Smokeless tobacco: Never Used   Substance Use Topics    Alcohol use: No    Drug use: No        Review of Systems   Constitutional: Negative for chills and fever  HENT: Negative for rhinorrhea and sore throat  Eyes: Negative for visual disturbance  Respiratory: Positive for cough, shortness of breath and wheezing      Cardiovascular: Negative for chest pain and leg swelling  Gastrointestinal: Negative for abdominal pain, diarrhea, nausea and vomiting  Genitourinary: Negative for dysuria  Musculoskeletal: Negative for back pain and myalgias  Skin: Negative for rash  Neurological: Negative for dizziness and headaches  Psychiatric/Behavioral: Negative for confusion  All other systems reviewed and are negative  Physical Exam  Physical Exam   Constitutional: She is oriented to person, place, and time  She appears well-developed and well-nourished  HENT:   Nose: Nose normal    Mouth/Throat: Oropharynx is clear and moist  No oropharyngeal exudate  Eyes: Pupils are equal, round, and reactive to light  Conjunctivae and EOM are normal  No scleral icterus  Neck: Normal range of motion  Neck supple  No JVD present  No tracheal deviation present  Cardiovascular: Normal rate, regular rhythm and normal heart sounds  No murmur heard  Pulmonary/Chest: Effort normal  No accessory muscle usage or stridor  No apnea, no tachypnea and no bradypnea  No respiratory distress  She has decreased breath sounds  She has wheezes  She has no rhonchi  She has no rales  Abdominal: Soft  Bowel sounds are normal  There is no tenderness  There is no guarding  Musculoskeletal: Normal range of motion  She exhibits no edema or tenderness  Neurological: She is alert and oriented to person, place, and time  No cranial nerve deficit or sensory deficit  She exhibits normal muscle tone  5/5 motor, nl sens   Skin: Skin is warm and dry  Psychiatric: She has a normal mood and affect  Her behavior is normal    Nursing note and vitals reviewed        Vital Signs  ED Triage Vitals [02/18/19 1344]   Temperature Pulse Respirations Blood Pressure SpO2   99 1 °F (37 3 °C) 90 18 146/59 96 %      Temp Source Heart Rate Source Patient Position - Orthostatic VS BP Location FiO2 (%)   Tympanic Monitor Sitting Left arm --      Pain Score       7           Vitals:    02/18/19 1344 02/18/19 1524   BP: 146/59 132/82   Pulse: 90 86   Patient Position - Orthostatic VS: Sitting Sitting       Visual Acuity      ED Medications  Medications   ipratropium-albuterol (DUO-NEB) 0 5-2 5 mg/3 mL inhalation solution 3 mL (3 mL Nebulization Given 2/18/19 1413)       Diagnostic Studies  Results Reviewed     Procedure Component Value Units Date/Time    POCT pregnancy, urine [[id number]]  (Normal) Resulted:  02/18/19 1431    Lab Status:  Final result Updated:  02/18/19 1431     EXT PREG TEST UR (Ref: Negative) negative                 XR chest 2 views   ED Interpretation by Rosalia Thorne DO (02/18 1446)   Two views of the chest reviewed by me  There is soft tissue seen LLL at base, no infiltrate and no effusions seen  Final Result by Sergio Bteh MD (02/18 1540)      No acute airspace consolidation   Nonspecific prominence of the lung markings, may be due to hypoinflation   No congestion seen            Workstation performed: [id number]                    Procedures  Procedures       Phone Contacts  ED Phone Contact    ED Course  ED Course as of Feb 18 1553   Mon Feb 18, 2019   1513 Results reviewed with patient  Plan of treatment with Levaquin and Tessalon discussed  Patient will follow up her primary care provider, Dr Howard Galdamez  MDM    Disposition  Final diagnoses:   Bronchitis   Cough     Time reflects when diagnosis was documented in both MDM as applicable and the Disposition within this note     Time User Action Codes Description Comment    2/18/2019  3:14 PM Bárbara Gould Add [J40] Bronchitis     2/18/2019  3:14 PM Bárbara Gould Add [R05] Cough       ED Disposition     ED Disposition Condition Date/Time Comment    Discharge Stable Mon Feb 18, 2019  3:14 PM Laxmi Shepard discharge to home/self care              Follow-up Information     Follow up With Specialties Details Why Annel Senior MD [facility] Schedule an appointment as soon as possible for a visit in 3 days  [facility]            Discharge Medication List as of 2/18/2019  3:21 PM      START taking these medications    Details   benzonatate (TESSALON) 200 MG capsule Take 1 capsule (200 mg total) by mouth 3 (three) times a day as needed for cough, Starting Mon 2/18/2019, Normal      levofloxacin (LEVAQUIN) 500 mg tablet Take 1 tablet (500 mg total) by mouth daily for 10 days, Starting Mon 2/18/2019, Until Thu 2/28/2019, Normal         CONTINUE these medications which have NOT CHANGED    Details   Celecoxib (CELEBREX PO) Take by mouth, Historical Med      gabapentin (NEURONTIN) 100 mg capsule Take 100 mg by mouth 3 (three) times a day, Historical Med           No discharge procedures on file      ED Provider  Electronically Signed by           Terri Villavicencio DO  02/18/19 7354

## 2019-03-21 ENCOUNTER — TRANSCRIBE ORDERS (OUTPATIENT)
Dept: ADMINISTRATIVE | Facility: HOSPITAL | Age: 44
End: 2019-03-21

## 2019-03-21 DIAGNOSIS — Z12.31 VISIT FOR SCREENING MAMMOGRAM: Primary | ICD-10-CM

## 2019-03-30 ENCOUNTER — HOSPITAL ENCOUNTER (EMERGENCY)
Facility: HOSPITAL | Age: 44
End: 2019-03-31
Attending: EMERGENCY MEDICINE
Payer: COMMERCIAL

## 2019-03-30 DIAGNOSIS — N93.8 DYSFUNCTIONAL UTERINE BLEEDING: Primary | ICD-10-CM

## 2019-03-30 PROCEDURE — 85610 PROTHROMBIN TIME: CPT | Performed by: EMERGENCY MEDICINE

## 2019-03-30 PROCEDURE — 36415 COLL VENOUS BLD VENIPUNCTURE: CPT | Performed by: EMERGENCY MEDICINE

## 2019-03-30 PROCEDURE — 99284 EMERGENCY DEPT VISIT MOD MDM: CPT

## 2019-03-30 PROCEDURE — 85730 THROMBOPLASTIN TIME PARTIAL: CPT | Performed by: EMERGENCY MEDICINE

## 2019-03-30 PROCEDURE — 85025 COMPLETE CBC W/AUTO DIFF WBC: CPT | Performed by: EMERGENCY MEDICINE

## 2019-03-30 RX ORDER — MEDROXYPROGESTERONE ACETATE 10 MG/1
10 TABLET ORAL DAILY
Status: ON HOLD | COMMUNITY
End: 2019-04-01 | Stop reason: SDUPTHER

## 2019-03-30 RX ORDER — GABAPENTIN 300 MG/1
300 CAPSULE ORAL 2 TIMES DAILY PRN
COMMUNITY

## 2019-03-31 ENCOUNTER — HOSPITAL ENCOUNTER (OUTPATIENT)
Facility: HOSPITAL | Age: 44
Setting detail: OBSERVATION
Discharge: HOME | End: 2019-04-01
Attending: OBSTETRICS & GYNECOLOGY | Admitting: OBSTETRICS & GYNECOLOGY
Payer: COMMERCIAL

## 2019-03-31 VITALS
HEART RATE: 84 BPM | SYSTOLIC BLOOD PRESSURE: 112 MMHG | DIASTOLIC BLOOD PRESSURE: 61 MMHG | RESPIRATION RATE: 16 BRPM | HEIGHT: 64 IN | WEIGHT: 239 LBS | TEMPERATURE: 98.7 F | OXYGEN SATURATION: 99 % | BODY MASS INDEX: 40.8 KG/M2

## 2019-03-31 DIAGNOSIS — N92.0 MENORRHAGIA WITH REGULAR CYCLE: Primary | ICD-10-CM

## 2019-03-31 PROBLEM — D64.9 SYMPTOMATIC ANEMIA: Status: ACTIVE | Noted: 2019-03-31

## 2019-03-31 LAB
ABO GROUP BLD: NORMAL
APTT PPP: 31 SECONDS (ref 26–38)
BACTERIA UR QL AUTO: ABNORMAL /HPF
BASOPHILS # BLD AUTO: 0.03 THOUSANDS/ΜL (ref 0–0.1)
BASOPHILS # BLD AUTO: 0.1 THOUSANDS/ΜL (ref 0–0.1)
BASOPHILS NFR BLD AUTO: 0 % (ref 0–1)
BASOPHILS NFR BLD AUTO: 1 % (ref 0–1)
BILIRUB UR QL STRIP: NEGATIVE
BLD GP AB SCN SERPL QL: NEGATIVE
CLARITY UR: CLEAR
COLOR UR: ABNORMAL
EOSINOPHIL # BLD AUTO: 0.19 THOUSAND/ΜL (ref 0–0.61)
EOSINOPHIL # BLD AUTO: 0.2 THOUSAND/ΜL (ref 0–0.61)
EOSINOPHIL NFR BLD AUTO: 3 % (ref 0–6)
EOSINOPHIL NFR BLD AUTO: 3 % (ref 0–6)
ERYTHROCYTE [DISTWIDTH] IN BLOOD BY AUTOMATED COUNT: 15.2 % (ref 11.6–15.1)
ERYTHROCYTE [DISTWIDTH] IN BLOOD BY AUTOMATED COUNT: 16.1 % (ref 11.6–15.1)
EXT PREG TEST URINE: NEGATIVE
GLUCOSE UR STRIP-MCNC: NEGATIVE MG/DL
HCT VFR BLD AUTO: 22.4 % (ref 37–47)
HCT VFR BLD AUTO: 26.4 % (ref 34.8–46.1)
HGB BLD-MCNC: 7.5 G/DL (ref 11.5–15.4)
HGB BLD-MCNC: 7.9 G/DL (ref 11.5–15.4)
HGB UR QL STRIP.AUTO: ABNORMAL
HYPERCHROMIA BLD QL SMEAR: PRESENT
IMM GRANULOCYTES # BLD AUTO: 0.06 THOUSAND/UL (ref 0–0.2)
IMM GRANULOCYTES NFR BLD AUTO: 1 % (ref 0–2)
INR PPP: 1 (ref 0.9–1.5)
KETONES UR STRIP-MCNC: NEGATIVE MG/DL
LEUKOCYTE ESTERASE UR QL STRIP: NEGATIVE
LYMPHOCYTES # BLD AUTO: 2.73 THOUSANDS/ΜL (ref 0.6–4.47)
LYMPHOCYTES # BLD AUTO: 3 THOUSANDS/ΜL (ref 0.6–4.47)
LYMPHOCYTES NFR BLD AUTO: 39 % (ref 14–44)
LYMPHOCYTES NFR BLD AUTO: 43 % (ref 14–44)
MCH RBC QN AUTO: 23.7 PG (ref 26.8–34.3)
MCH RBC QN AUTO: 24.9 PG (ref 26.8–34.3)
MCHC RBC AUTO-ENTMCNC: 29.9 G/DL (ref 31.4–37.4)
MCHC RBC AUTO-ENTMCNC: 33.4 G/DL (ref 31.4–37.4)
MCV RBC AUTO: 74 FL (ref 82–98)
MCV RBC AUTO: 79 FL (ref 82–98)
MICROCYTES BLD QL AUTO: PRESENT
MONOCYTES # BLD AUTO: 0.5 THOUSAND/ΜL (ref 0.17–1.22)
MONOCYTES # BLD AUTO: 0.54 THOUSAND/ΜL (ref 0.17–1.22)
MONOCYTES NFR BLD AUTO: 7 % (ref 4–12)
MONOCYTES NFR BLD AUTO: 8 % (ref 4–12)
NEUTROPHILS # BLD AUTO: 3.3 THOUSANDS/ΜL (ref 1.85–7.62)
NEUTROPHILS # BLD AUTO: 3.55 THOUSANDS/ΜL (ref 1.85–7.62)
NEUTS SEG NFR BLD AUTO: 47 % (ref 43–75)
NEUTS SEG NFR BLD AUTO: 49 % (ref 43–75)
NITRITE UR QL STRIP: NEGATIVE
NON-SQ EPI CELLS URNS QL MICRO: ABNORMAL /HPF
NRBC BLD AUTO-RTO: 0 /100 WBCS
OVALOCYTES BLD QL SMEAR: PRESENT
PH UR STRIP.AUTO: 6.5 [PH]
PLATELET # BLD AUTO: 314 THOUSANDS/UL (ref 149–390)
PLATELET # BLD AUTO: 368 THOUSANDS/UL (ref 149–390)
PLATELET BLD QL SMEAR: ADEQUATE
PMV BLD AUTO: 7.9 FL (ref 8.9–12.7)
PMV BLD AUTO: 9.6 FL (ref 8.9–12.7)
PROT UR STRIP-MCNC: NEGATIVE MG/DL
PROTHROMBIN TIME: 11.7 SECONDS (ref 10.2–13)
RBC # BLD AUTO: 3.01 MILLION/UL (ref 3.81–5.12)
RBC # BLD AUTO: 3.33 MILLION/UL (ref 3.81–5.12)
RBC #/AREA URNS AUTO: ABNORMAL /HPF
RBC MORPH BLD: PRESENT
RH BLD: POSITIVE
SP GR UR STRIP.AUTO: <=1.005 (ref 1–1.03)
SPECIMEN EXPIRATION DATE: NORMAL
UROBILINOGEN UR QL STRIP.AUTO: 0.2 E.U./DL
WBC # BLD AUTO: 7.1 THOUSAND/UL (ref 4.31–10.16)
WBC # BLD AUTO: 7.1 THOUSAND/UL (ref 4.31–10.16)
WBC #/AREA URNS AUTO: ABNORMAL /HPF

## 2019-03-31 PROCEDURE — 81001 URINALYSIS AUTO W/SCOPE: CPT | Performed by: EMERGENCY MEDICINE

## 2019-03-31 PROCEDURE — 86850 RBC ANTIBODY SCREEN: CPT | Performed by: STUDENT IN AN ORGANIZED HEALTH CARE EDUCATION/TRAINING PROGRAM

## 2019-03-31 PROCEDURE — 81025 URINE PREGNANCY TEST: CPT | Performed by: EMERGENCY MEDICINE

## 2019-03-31 PROCEDURE — 86920 COMPATIBILITY TEST SPIN: CPT

## 2019-03-31 PROCEDURE — P9040 RBC LEUKOREDUCED IRRADIATED: HCPCS

## 2019-03-31 PROCEDURE — 86900 BLOOD TYPING SEROLOGIC ABO: CPT | Performed by: STUDENT IN AN ORGANIZED HEALTH CARE EDUCATION/TRAINING PROGRAM

## 2019-03-31 PROCEDURE — 86901 BLOOD TYPING SEROLOGIC RH(D): CPT | Performed by: STUDENT IN AN ORGANIZED HEALTH CARE EDUCATION/TRAINING PROGRAM

## 2019-03-31 PROCEDURE — 81003 URINALYSIS AUTO W/O SCOPE: CPT | Performed by: EMERGENCY MEDICINE

## 2019-03-31 PROCEDURE — 85025 COMPLETE CBC W/AUTO DIFF WBC: CPT | Performed by: STUDENT IN AN ORGANIZED HEALTH CARE EDUCATION/TRAINING PROGRAM

## 2019-03-31 PROCEDURE — G0379 DIRECT REFER HOSPITAL OBSERV: HCPCS

## 2019-03-31 RX ORDER — GABAPENTIN 300 MG/1
300 CAPSULE ORAL 2 TIMES DAILY
Status: DISCONTINUED | OUTPATIENT
Start: 2019-03-31 | End: 2019-04-01 | Stop reason: HOSPADM

## 2019-03-31 RX ORDER — MEDROXYPROGESTERONE ACETATE 5 MG/1
10 TABLET ORAL DAILY
Status: DISCONTINUED | OUTPATIENT
Start: 2019-03-31 | End: 2019-03-31

## 2019-03-31 RX ORDER — ACETAMINOPHEN 325 MG/1
650 TABLET ORAL EVERY 6 HOURS PRN
Status: DISCONTINUED | OUTPATIENT
Start: 2019-03-31 | End: 2019-04-01 | Stop reason: HOSPADM

## 2019-03-31 RX ADMIN — MEDROXYPROGESTERONE ACETATE 10 MG: 5 TABLET ORAL at 15:13

## 2019-03-31 RX ADMIN — ACETAMINOPHEN 650 MG: 325 TABLET ORAL at 13:26

## 2019-04-01 VITALS
DIASTOLIC BLOOD PRESSURE: 76 MMHG | HEART RATE: 79 BPM | TEMPERATURE: 97.6 F | RESPIRATION RATE: 16 BRPM | SYSTOLIC BLOOD PRESSURE: 119 MMHG | OXYGEN SATURATION: 98 %

## 2019-04-01 LAB
ABO GROUP BLD BPU: NORMAL
BASOPHILS # BLD AUTO: 0.03 THOUSANDS/ΜL (ref 0–0.1)
BASOPHILS NFR BLD AUTO: 0 % (ref 0–1)
BPU ID: NORMAL
CROSSMATCH: NORMAL
EOSINOPHIL # BLD AUTO: 0.21 THOUSAND/ΜL (ref 0–0.61)
EOSINOPHIL NFR BLD AUTO: 3 % (ref 0–6)
ERYTHROCYTE [DISTWIDTH] IN BLOOD BY AUTOMATED COUNT: 15.4 % (ref 11.6–15.1)
ERYTHROCYTE [DISTWIDTH] IN BLOOD BY AUTOMATED COUNT: 15.7 % (ref 11.6–15.1)
HCT VFR BLD AUTO: 27 % (ref 34.8–46.1)
HCT VFR BLD AUTO: 27.4 % (ref 34.8–46.1)
HGB BLD-MCNC: 8.4 G/DL (ref 11.5–15.4)
HGB BLD-MCNC: 8.4 G/DL (ref 11.5–15.4)
IMM GRANULOCYTES # BLD AUTO: 0.1 THOUSAND/UL (ref 0–0.2)
IMM GRANULOCYTES NFR BLD AUTO: 1 % (ref 0–2)
LYMPHOCYTES # BLD AUTO: 3 THOUSANDS/ΜL (ref 0.6–4.47)
LYMPHOCYTES NFR BLD AUTO: 41 % (ref 14–44)
MCH RBC QN AUTO: 25.1 PG (ref 26.8–34.3)
MCH RBC QN AUTO: 25.1 PG (ref 26.8–34.3)
MCHC RBC AUTO-ENTMCNC: 30.7 G/DL (ref 31.4–37.4)
MCHC RBC AUTO-ENTMCNC: 31.1 G/DL (ref 31.4–37.4)
MCV RBC AUTO: 81 FL (ref 82–98)
MCV RBC AUTO: 82 FL (ref 82–98)
MONOCYTES # BLD AUTO: 0.57 THOUSAND/ΜL (ref 0.17–1.22)
MONOCYTES NFR BLD AUTO: 8 % (ref 4–12)
NEUTROPHILS # BLD AUTO: 3.49 THOUSANDS/ΜL (ref 1.85–7.62)
NEUTS SEG NFR BLD AUTO: 47 % (ref 43–75)
NRBC BLD AUTO-RTO: 0 /100 WBCS
PLATELET # BLD AUTO: 312 THOUSANDS/UL (ref 149–390)
PLATELET # BLD AUTO: 322 THOUSANDS/UL (ref 149–390)
PMV BLD AUTO: 10 FL (ref 8.9–12.7)
PMV BLD AUTO: 9.7 FL (ref 8.9–12.7)
RBC # BLD AUTO: 3.34 MILLION/UL (ref 3.81–5.12)
RBC # BLD AUTO: 3.35 MILLION/UL (ref 3.81–5.12)
UNIT DISPENSE STATUS: NORMAL
UNIT PRODUCT CODE: NORMAL
UNIT RH: NORMAL
WBC # BLD AUTO: 7.4 THOUSAND/UL (ref 4.31–10.16)
WBC # BLD AUTO: 7.45 THOUSAND/UL (ref 4.31–10.16)

## 2019-04-01 PROCEDURE — ERR1 ERRONEOUS ENCOUNTER-DISREGARD: Performed by: STUDENT IN AN ORGANIZED HEALTH CARE EDUCATION/TRAINING PROGRAM

## 2019-04-01 PROCEDURE — 88305 TISSUE EXAM BY PATHOLOGIST: CPT | Performed by: PATHOLOGY

## 2019-04-01 PROCEDURE — 85025 COMPLETE CBC W/AUTO DIFF WBC: CPT | Performed by: STUDENT IN AN ORGANIZED HEALTH CARE EDUCATION/TRAINING PROGRAM

## 2019-04-01 PROCEDURE — 85027 COMPLETE CBC AUTOMATED: CPT | Performed by: OBSTETRICS & GYNECOLOGY

## 2019-04-01 PROCEDURE — P9016 RBC LEUKOCYTES REDUCED: HCPCS

## 2019-04-01 RX ORDER — MEDROXYPROGESTERONE ACETATE 10 MG/1
15 TABLET ORAL DAILY
Qty: 30 TABLET | Refills: 0 | Status: SHIPPED | OUTPATIENT
Start: 2019-04-01 | End: 2019-04-09 | Stop reason: ALTCHOICE

## 2019-04-01 RX ADMIN — NORETHINDRONE ACETATE 5 MG: 5 TABLET ORAL at 09:06

## 2019-04-01 RX ADMIN — ACETAMINOPHEN 650 MG: 325 TABLET ORAL at 09:05

## 2019-04-02 LAB
ABO GROUP BLD BPU: NORMAL
BPU ID: NORMAL
CROSSMATCH: NORMAL
UNIT DISPENSE STATUS: NORMAL
UNIT PRODUCT CODE: NORMAL
UNIT RH: NORMAL

## 2019-04-03 ENCOUNTER — HOSPITAL ENCOUNTER (OUTPATIENT)
Dept: MAMMOGRAPHY | Facility: HOSPITAL | Age: 44
Discharge: HOME | End: 2019-04-03
Payer: COMMERCIAL

## 2019-04-03 VITALS — BODY MASS INDEX: 40.8 KG/M2 | WEIGHT: 239 LBS | HEIGHT: 64 IN

## 2019-04-03 DIAGNOSIS — Z12.31 VISIT FOR SCREENING MAMMOGRAM: ICD-10-CM

## 2019-04-03 PROCEDURE — 77067 SCR MAMMO BI INCL CAD: CPT

## 2019-04-04 ENCOUNTER — OFFICE VISIT (OUTPATIENT)
Dept: OBGYN CLINIC | Facility: CLINIC | Age: 44
End: 2019-04-04

## 2019-04-04 VITALS
WEIGHT: 236 LBS | BODY MASS INDEX: 40.51 KG/M2 | SYSTOLIC BLOOD PRESSURE: 129 MMHG | HEART RATE: 80 BPM | DIASTOLIC BLOOD PRESSURE: 90 MMHG

## 2019-04-04 DIAGNOSIS — D64.9 ANEMIA, UNSPECIFIED TYPE: ICD-10-CM

## 2019-04-04 DIAGNOSIS — Z12.4 CERVICAL CANCER SCREENING: ICD-10-CM

## 2019-04-04 DIAGNOSIS — N93.9 ABNORMAL UTERINE BLEEDING: Primary | ICD-10-CM

## 2019-04-04 PROBLEM — N92.0 MENORRHAGIA WITH REGULAR CYCLE: Status: RESOLVED | Noted: 2019-03-31 | Resolved: 2019-04-04

## 2019-04-04 PROCEDURE — G0124 SCREEN C/V THIN LAYER BY MD: HCPCS | Performed by: PATHOLOGY

## 2019-04-04 PROCEDURE — G0145 SCR C/V CYTO,THINLAYER,RESCR: HCPCS | Performed by: PATHOLOGY

## 2019-04-04 PROCEDURE — 99203 OFFICE O/P NEW LOW 30 MIN: CPT | Performed by: OBSTETRICS & GYNECOLOGY

## 2019-04-04 PROCEDURE — 87624 HPV HI-RISK TYP POOLED RSLT: CPT | Performed by: OBSTETRICS & GYNECOLOGY

## 2019-04-04 RX ORDER — DOCUSATE SODIUM 100 MG/1
100 CAPSULE, LIQUID FILLED ORAL 2 TIMES DAILY
Qty: 30 CAPSULE | Refills: 11 | Status: SHIPPED | OUTPATIENT
Start: 2019-04-04 | End: 2019-11-26

## 2019-04-04 RX ORDER — FERROUS SULFATE TAB EC 324 MG (65 MG FE EQUIVALENT) 324 (65 FE) MG
324 TABLET DELAYED RESPONSE ORAL
Qty: 30 TABLET | Refills: 11 | Status: SHIPPED | OUTPATIENT
Start: 2019-04-04 | End: 2019-11-26

## 2019-04-08 LAB
HPV HR 12 DNA CVX QL NAA+PROBE: NEGATIVE
HPV16 DNA CVX QL NAA+PROBE: NEGATIVE
HPV18 DNA CVX QL NAA+PROBE: NEGATIVE

## 2019-04-12 ENCOUNTER — ANESTHESIA EVENT (OUTPATIENT)
Dept: PERIOP | Facility: HOSPITAL | Age: 44
End: 2019-04-12
Payer: COMMERCIAL

## 2019-04-15 ENCOUNTER — HOSPITAL ENCOUNTER (OUTPATIENT)
Facility: HOSPITAL | Age: 44
Setting detail: OUTPATIENT SURGERY
Discharge: HOME | End: 2019-04-15
Attending: OBSTETRICS & GYNECOLOGY | Admitting: OBSTETRICS & GYNECOLOGY
Payer: COMMERCIAL

## 2019-04-15 ENCOUNTER — ANESTHESIA (OUTPATIENT)
Dept: PERIOP | Facility: HOSPITAL | Age: 44
End: 2019-04-15
Payer: COMMERCIAL

## 2019-04-15 VITALS
RESPIRATION RATE: 18 BRPM | TEMPERATURE: 98.1 F | OXYGEN SATURATION: 99 % | WEIGHT: 239 LBS | HEART RATE: 74 BPM | SYSTOLIC BLOOD PRESSURE: 142 MMHG | DIASTOLIC BLOOD PRESSURE: 80 MMHG | BODY MASS INDEX: 40.8 KG/M2 | HEIGHT: 64 IN

## 2019-04-15 DIAGNOSIS — N93.9 ABNORMAL UTERINE BLEEDING: ICD-10-CM

## 2019-04-15 DIAGNOSIS — Z98.890 S/P ENDOMETRIAL ABLATION: Primary | ICD-10-CM

## 2019-04-15 LAB — EXT PREGNANCY TEST URINE: NEGATIVE

## 2019-04-15 PROCEDURE — 81025 URINE PREGNANCY TEST: CPT | Performed by: ANESTHESIOLOGY

## 2019-04-15 PROCEDURE — 58563 HYSTEROSCOPY ABLATION: CPT | Performed by: OBSTETRICS & GYNECOLOGY

## 2019-04-15 PROCEDURE — 88305 TISSUE EXAM BY PATHOLOGIST: CPT | Performed by: PATHOLOGY

## 2019-04-15 RX ORDER — IBUPROFEN 600 MG/1
600 TABLET ORAL EVERY 6 HOURS PRN
Status: DISCONTINUED | OUTPATIENT
Start: 2019-04-15 | End: 2019-04-15 | Stop reason: HOSPADM

## 2019-04-15 RX ORDER — DEXAMETHASONE SODIUM PHOSPHATE 4 MG/ML
INJECTION, SOLUTION INTRA-ARTICULAR; INTRALESIONAL; INTRAMUSCULAR; INTRAVENOUS; SOFT TISSUE AS NEEDED
Status: DISCONTINUED | OUTPATIENT
Start: 2019-04-15 | End: 2019-04-15 | Stop reason: SURG

## 2019-04-15 RX ORDER — SODIUM CHLORIDE 9 MG/ML
INJECTION, SOLUTION INTRAVENOUS AS NEEDED
Status: DISCONTINUED | OUTPATIENT
Start: 2019-04-15 | End: 2019-04-15 | Stop reason: HOSPADM

## 2019-04-15 RX ORDER — PROPOFOL 10 MG/ML
INJECTION, EMULSION INTRAVENOUS AS NEEDED
Status: DISCONTINUED | OUTPATIENT
Start: 2019-04-15 | End: 2019-04-15 | Stop reason: SURG

## 2019-04-15 RX ORDER — OXYCODONE HYDROCHLORIDE AND ACETAMINOPHEN 5; 325 MG/1; MG/1
1 TABLET ORAL EVERY 4 HOURS PRN
Qty: 5 TABLET | Refills: 0 | Status: SHIPPED | OUTPATIENT
Start: 2019-04-15 | End: 2019-04-25

## 2019-04-15 RX ORDER — ONDANSETRON 2 MG/ML
INJECTION INTRAMUSCULAR; INTRAVENOUS AS NEEDED
Status: DISCONTINUED | OUTPATIENT
Start: 2019-04-15 | End: 2019-04-15 | Stop reason: SURG

## 2019-04-15 RX ORDER — FENTANYL CITRATE/PF 50 MCG/ML
25 SYRINGE (ML) INJECTION
Status: COMPLETED | OUTPATIENT
Start: 2019-04-15 | End: 2019-04-15

## 2019-04-15 RX ORDER — KETOROLAC TROMETHAMINE 30 MG/ML
30 INJECTION, SOLUTION INTRAMUSCULAR; INTRAVENOUS ONCE
Status: COMPLETED | OUTPATIENT
Start: 2019-04-15 | End: 2019-04-15

## 2019-04-15 RX ORDER — ACETAMINOPHEN 325 MG/1
650 TABLET ORAL EVERY 6 HOURS PRN
Status: DISCONTINUED | OUTPATIENT
Start: 2019-04-15 | End: 2019-04-15 | Stop reason: HOSPADM

## 2019-04-15 RX ORDER — SODIUM CHLORIDE 9 MG/ML
125 INJECTION, SOLUTION INTRAVENOUS CONTINUOUS
Status: DISCONTINUED | OUTPATIENT
Start: 2019-04-15 | End: 2019-04-15 | Stop reason: HOSPADM

## 2019-04-15 RX ORDER — MIDAZOLAM HYDROCHLORIDE 1 MG/ML
INJECTION INTRAMUSCULAR; INTRAVENOUS AS NEEDED
Status: DISCONTINUED | OUTPATIENT
Start: 2019-04-15 | End: 2019-04-15 | Stop reason: SURG

## 2019-04-15 RX ORDER — FENTANYL CITRATE 50 UG/ML
INJECTION, SOLUTION INTRAMUSCULAR; INTRAVENOUS AS NEEDED
Status: DISCONTINUED | OUTPATIENT
Start: 2019-04-15 | End: 2019-04-15 | Stop reason: SURG

## 2019-04-15 RX ORDER — ONDANSETRON 2 MG/ML
4 INJECTION INTRAMUSCULAR; INTRAVENOUS EVERY 6 HOURS PRN
Status: DISCONTINUED | OUTPATIENT
Start: 2019-04-15 | End: 2019-04-15 | Stop reason: HOSPADM

## 2019-04-15 RX ORDER — IBUPROFEN 600 MG/1
600 TABLET ORAL EVERY 6 HOURS PRN
Qty: 30 TABLET | Refills: 0 | Status: SHIPPED | OUTPATIENT
Start: 2019-04-15 | End: 2019-11-26

## 2019-04-15 RX ORDER — HYDROMORPHONE HCL/PF 1 MG/ML
0.5 SYRINGE (ML) INJECTION ONCE
Status: COMPLETED | OUTPATIENT
Start: 2019-04-15 | End: 2019-04-15

## 2019-04-15 RX ORDER — ONDANSETRON 2 MG/ML
4 INJECTION INTRAMUSCULAR; INTRAVENOUS ONCE AS NEEDED
Status: DISCONTINUED | OUTPATIENT
Start: 2019-04-15 | End: 2019-04-15 | Stop reason: HOSPADM

## 2019-04-15 RX ORDER — OXYCODONE HYDROCHLORIDE AND ACETAMINOPHEN 5; 325 MG/1; MG/1
1 TABLET ORAL ONCE AS NEEDED
Status: COMPLETED | OUTPATIENT
Start: 2019-04-15 | End: 2019-04-15

## 2019-04-15 RX ORDER — OXYCODONE HYDROCHLORIDE AND ACETAMINOPHEN 5; 325 MG/1; MG/1
2 TABLET ORAL ONCE AS NEEDED
Status: COMPLETED | OUTPATIENT
Start: 2019-04-15 | End: 2019-04-15

## 2019-04-15 RX ADMIN — LIDOCAINE HYDROCHLORIDE 40 MG: 20 INJECTION, SOLUTION INTRAVENOUS at 09:51

## 2019-04-15 RX ADMIN — FENTANYL CITRATE 25 MCG: 50 INJECTION, SOLUTION INTRAMUSCULAR; INTRAVENOUS at 11:06

## 2019-04-15 RX ADMIN — OXYCODONE HYDROCHLORIDE AND ACETAMINOPHEN 2 TABLET: 5; 325 TABLET ORAL at 16:24

## 2019-04-15 RX ADMIN — PROPOFOL 200 MG: 10 INJECTION, EMULSION INTRAVENOUS at 09:51

## 2019-04-15 RX ADMIN — SODIUM CHLORIDE 125 ML/HR: 0.9 INJECTION, SOLUTION INTRAVENOUS at 07:31

## 2019-04-15 RX ADMIN — DEXAMETHASONE SODIUM PHOSPHATE 4 MG: 4 INJECTION, SOLUTION INTRAMUSCULAR; INTRAVENOUS at 10:03

## 2019-04-15 RX ADMIN — IBUPROFEN 600 MG: 600 TABLET ORAL at 12:12

## 2019-04-15 RX ADMIN — ONDANSETRON 4 MG: 2 INJECTION INTRAMUSCULAR; INTRAVENOUS at 13:40

## 2019-04-15 RX ADMIN — OXYCODONE HYDROCHLORIDE AND ACETAMINOPHEN 1 TABLET: 5; 325 TABLET ORAL at 12:12

## 2019-04-15 RX ADMIN — MIDAZOLAM 2 MG: 1 INJECTION INTRAMUSCULAR; INTRAVENOUS at 09:47

## 2019-04-15 RX ADMIN — FENTANYL CITRATE 50 MCG: 50 INJECTION, SOLUTION INTRAMUSCULAR; INTRAVENOUS at 09:59

## 2019-04-15 RX ADMIN — FENTANYL CITRATE 25 MCG: 50 INJECTION, SOLUTION INTRAMUSCULAR; INTRAVENOUS at 10:43

## 2019-04-15 RX ADMIN — FENTANYL CITRATE 25 MCG: 50 INJECTION, SOLUTION INTRAMUSCULAR; INTRAVENOUS at 11:15

## 2019-04-15 RX ADMIN — ONDANSETRON 4 MG: 2 INJECTION INTRAMUSCULAR; INTRAVENOUS at 10:21

## 2019-04-15 RX ADMIN — HYDROMORPHONE HYDROCHLORIDE 0.5 MG: 1 INJECTION, SOLUTION INTRAMUSCULAR; INTRAVENOUS; SUBCUTANEOUS at 13:40

## 2019-04-15 RX ADMIN — FENTANYL CITRATE 25 MCG: 50 INJECTION, SOLUTION INTRAMUSCULAR; INTRAVENOUS at 10:59

## 2019-04-15 RX ADMIN — FENTANYL CITRATE 50 MCG: 50 INJECTION, SOLUTION INTRAMUSCULAR; INTRAVENOUS at 10:17

## 2019-04-15 RX ADMIN — SODIUM CHLORIDE 125 ML/HR: 0.9 INJECTION, SOLUTION INTRAVENOUS at 11:17

## 2019-04-15 RX ADMIN — KETOROLAC TROMETHAMINE 30 MG: 30 INJECTION, SOLUTION INTRAMUSCULAR; INTRAVENOUS at 10:48

## 2019-04-16 LAB
LAB AP GYN PRIMARY INTERPRETATION: NORMAL
Lab: NORMAL
PATH INTERP SPEC-IMP: NORMAL

## 2019-04-25 ENCOUNTER — OFFICE VISIT (OUTPATIENT)
Dept: OBGYN CLINIC | Facility: CLINIC | Age: 44
End: 2019-04-25

## 2019-04-25 VITALS — WEIGHT: 235 LBS | DIASTOLIC BLOOD PRESSURE: 89 MMHG | SYSTOLIC BLOOD PRESSURE: 140 MMHG | BODY MASS INDEX: 40.34 KG/M2

## 2019-04-25 DIAGNOSIS — Z09 POSTOP CHECK: Primary | ICD-10-CM

## 2019-04-25 PROCEDURE — 99212 OFFICE O/P EST SF 10 MIN: CPT | Performed by: OBSTETRICS & GYNECOLOGY

## 2019-06-20 ENCOUNTER — TELEPHONE (OUTPATIENT)
Dept: OBGYN CLINIC | Facility: HOSPITAL | Age: 44
End: 2019-06-20

## 2019-07-19 ENCOUNTER — TELEPHONE (OUTPATIENT)
Dept: OBGYN CLINIC | Facility: HOSPITAL | Age: 44
End: 2019-07-19

## 2019-07-19 NOTE — TELEPHONE ENCOUNTER
Patient is calling to confirm receipt of the disk containing her medical records in reference to her foot surgery  Please confirm with patient      Call back [phone]

## 2019-07-22 NOTE — TELEPHONE ENCOUNTER
Called the patient and left a detailed message on her voicemail stating that we have not received either of the medical records or the disc

## 2019-07-30 NOTE — TELEPHONE ENCOUNTER
Patient is calling again stating that we should have received her records & would like someone to call her to let her know if we did get them  Patient is stating that she received confirmation that "the hospital" received them so we should have them by now  Patient is stating that it was addressed to Rodrick Trejo [facility]  Patient is stating that she really needs an appt because she cannot walk

## 2019-07-30 NOTE — TELEPHONE ENCOUNTER
Records have been received and are in the chart under Care Everywhere waiting for the doctor to review  Patient had previous sx and would like to be seen asap  Patient states that she is in extreme amount of pain and cannot walk

## 2019-07-30 NOTE — TELEPHONE ENCOUNTER
You can schedule the patient at the next new patient opening  The patient must be out of the 90 post operative period

## 2019-08-08 ENCOUNTER — APPOINTMENT (OUTPATIENT)
Dept: RADIOLOGY | Facility: CLINIC | Age: 44
End: 2019-08-08
Payer: COMMERCIAL

## 2019-08-08 ENCOUNTER — OFFICE VISIT (OUTPATIENT)
Dept: OBGYN CLINIC | Facility: CLINIC | Age: 44
End: 2019-08-08
Payer: COMMERCIAL

## 2019-08-08 VITALS
HEIGHT: 64 IN | DIASTOLIC BLOOD PRESSURE: 90 MMHG | WEIGHT: 241 LBS | HEART RATE: 76 BPM | BODY MASS INDEX: 41.15 KG/M2 | SYSTOLIC BLOOD PRESSURE: 134 MMHG

## 2019-08-08 DIAGNOSIS — M25.572 LEFT ANKLE PAIN, UNSPECIFIED CHRONICITY: ICD-10-CM

## 2019-08-08 DIAGNOSIS — M95.8 OSTEOCHONDRAL DEFECT OF TALUS: ICD-10-CM

## 2019-08-08 DIAGNOSIS — M25.572 LEFT ANKLE PAIN, UNSPECIFIED CHRONICITY: Primary | ICD-10-CM

## 2019-08-08 PROCEDURE — 99203 OFFICE O/P NEW LOW 30 MIN: CPT | Performed by: ORTHOPAEDIC SURGERY

## 2019-08-08 PROCEDURE — 73610 X-RAY EXAM OF ANKLE: CPT

## 2019-08-08 NOTE — PATIENT INSTRUCTIONS
Desensitization Techniques for Neuropathic (nerve) Pain     Desensitizing the affected area can be very beneficial in decreasing nerve pain and restoring normal feeling  Start slow to your individual tolerance  This is a process meant to help you  Do not do more than you can tolerate  To be effective, these techniques must be done 2 to 4 times a day every day  Below are listed several types of techniques  You may choose to do one or all but the more variety, the more likely a positive response  Contrast baths  You will need two containers of water big enough to place the painful area  Use a thermometer to measure water temperature  In one container place cold water (45 to 70 degrees) and in the other warm water (104 to 110 degrees)  Place painful part in cold water for 30 seconds then warm water for two minutes  Repeat this process 5 times  The first time, start with cool water at 70* and warm water at 104*  Over time the cool water should become colder and the warm water warmer  Never exceed recommended temperatures  Touch stimulation  There are many types of touch stimulation that may be done  Length of time exercise is done depends on your individual tolerance to touch  The more variety, the more likely a positive outcome  This entire process should take 10 minutes  Start with 2 to 3 times a day every day for up to 10 minutes and progress to 10 minutes 4 times a day every day  Alternating stimulation sensation is critical to effectiveness  Do not pick just one and stay with it  Stroke painful area with a cotton ball in one direction  Stroke painful area with soft fabric (i e  silk) alternating with a rougher one, such as a yohan cloth towel  Put cornmeal or rice in one container move painful area through it  What you choose to use depends on sensitivity of painful area  Work up to using [employer] or aquarium gravel     Lightly stroke painful area with your fingers and work up to scratching with fingernails or applying deep pressure

## 2019-08-08 NOTE — PROGRESS NOTES
ANDREW Cabrera  Attending, Orthopaedic Surgery  [facility] [address] [facility]      ORTHOPAEDIC FOOT AND ANKLE CLINIC VISIT     Assessment:     Encounter Diagnoses   Name Primary? 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 dicussed  · She is point tender over medial talar dome  · Continue with ankle brace for support  · Also given desensitization information as she is hypersensitive to light touch in ankle posterior lateral aspect, sural nerve is functioning  Return in about 2 weeks (around 8/22/2019) for Recheck  History of Present Illness:   Chief Complaint:   Chief Complaint   Patient presents with    Left Ankle - Pain     Leydi Longoria is a 53 y o  female who is being seen for left ankle  She states she has had ankle instability/laxity since she was young child requiring tightening of ankle right first which has helped and then left 2010  She then 2017 started to have pain laxity in ankle where her foot would invert causing acute pain anteromedial ankle  She then had clean out procedure, then she had 3 more clean out procedures within next 6 months of 2018, microfracture medial talar dome, removal of tumor  She continues to note pain and instability in ankle  She is wearing supportive ankle brace to prevent from rolling inward   She had surgeries done by Dr Renea Garcia group podiatrist, states she needs ankle replacement vs fusion  She has done extension physical therapy following each surgery  She is only able to stand for about 15 minutes before pain starts  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 and has hypersensitivity to posterior aspect of ankle and lateral aspect of foot  Pain is localized at medial talar dome with minimal radiating and described as sharp and severe  Patient denies numbness, tingling or radicular pain  Denies history of neuropathy  Patient does not smoke, does not have diabetes and does not take blood thinners  Patient denies family history of anesthesia complications and has not had any complications with anesthesia  Pain/symptom timing:  Worse during the day when active  Pain/symptom context:  Worse with activites and work  Pain/symptom modifying factors:  Rest makes better, activities make worse  Pain/symptom associated signs/symptoms: none    Prior treatment   · NSAIDsYes   · Injections No   · Bracing/Orthotics Yes    · Physical Therapy Yes     Orthopedic Surgical History:   See below     Past Medical, Surgical and Social History:  Past Medical History:  has a past medical history of Anemia, Arthritis, Bronchitis, Chronic pain disorder, History of transfusion, Hypertension, Pneumonia, and Wears glasses  Problem List: does not have any pertinent problems on file  Past Surgical History:  has a past surgical history that includes Cholecystectomy; Tubal ligation; Ankle arthroscopy; Ankle ligament reconstruction (Right); Excision heel spur excision (Right, 2007); Colonoscopy; Breast biopsy (Left, 2016); pr hysteroscopy,w/endometrial ablation (N/A, 4/15/2019); and DILATION AND CURETTAGE OF UTERUS WITH HYSTEROSOCPY (N/A, 4/15/2019)  Family History: family history includes Breast cancer (age of onset: 32) in her cousin; Breast cancer (age of onset: 40) in her paternal grandmother; Breast cancer (age of onset: 48) in her paternal aunt; Cancer in her father    Social History:  reports that she has been smoking cigarettes  She has a 15 00 pack-year smoking history  She has never used smokeless tobacco  She reports that she drinks alcohol  She reports that she does not use drugs  Current Medications: has a current medication list which includes the following prescription(s): celecoxib, docusate sodium, ferrous sulfate, gabapentin, and ibuprofen  Allergies: has No Known Allergies  Review of Systems:  General- denies fever/chills  HEENT- denies hearing loss or sore throat  Eyes- denies eye pain or visual disturbances, denies red eyes  Respiratory- denies cough or SOB  Cardio- denies chest pain or palpitations  GI- denies abdominal pain  Endocrine- denies urinary frequency  Urinary- denies pain with urination  Musculoskeletal- Negative except noted above  Skin- denies rashes or wounds  Neurological- denies dizziness or headache  Psychiatric- denies anxiety or difficulty concentrating    Physical Exam:   /90 (BP Location: Right arm, Patient Position: Sitting, Cuff Size: Adult)   Pulse 76   Ht 5' 4" (1 626 m)   Wt 109 kg (241 lb)   BMI 41 37 kg/m²   General/Constitutional: No apparent distress: well-nourished and well developed  Eyes: normal ocular motion  Lymphatic: No appreciable lymphadenopathy  Respiratory: Non-labored breathing  Vascular: No edema, swelling or tenderness, except as noted in detailed exam   Integumentary: No impressive skin lesions present, except as noted in detailed exam   Neuro: No ataxia or tremors noted  Psych: Normal mood and affect, oriented to person, place and time  Appropriate affect  Musculoskeletal: Normal, except as noted in detailed exam and in HPI  Examination    Left    Gait Antalgic with ankle brace    Musculoskeletal Tender to palpation at medial talar dome     Skin Normal   Well-healed incisions      Nails Normal    Range of Motion  20 degrees dorsiflexion, 40 degrees plantarflexion  Subtalar motion: normal    Stability Stable    Muscle Strength 5/5 tibialis anterior  5/5 gastrocnemius-soleus  5/5 posterior tibialis  5/5 peroneal/eversion strength  5/5 EHL  5/5 FHL    Neurologic Normal    Sensation Intact to light touch throughout sural, saphenous, superficial peroneal, deep peroneal and medial/lateral plantar nerve distributions  [place]-Jered 5 07 filament (10g) testing deferred  Cardiovascular Brisk capillary refill < 2 seconds,intact DP and PT pulses    Special Tests None      Imaging Studies:   3  views of the left ankle  were taken, reviewed and interpreted independently that demonstrate osteocheochondral defect medial talar dome  Reviewed by me personally  Quince Reels Lachman, MD  Foot & Ankle Surgery   Department of [address] [place] [address]      I personally performed the service  Quince Reels Lachman, MD

## 2019-08-21 ENCOUNTER — HOSPITAL ENCOUNTER (OUTPATIENT)
Dept: MRI IMAGING | Facility: HOSPITAL | Age: 44
Discharge: HOME | End: 2019-08-21
Attending: ORTHOPAEDIC SURGERY
Payer: COMMERCIAL

## 2019-08-21 DIAGNOSIS — M95.8 OSTEOCHONDRAL DEFECT OF TALUS: ICD-10-CM

## 2019-08-21 PROCEDURE — 73721 MRI JNT OF LWR EXTRE W/O DYE: CPT

## 2019-08-29 ENCOUNTER — OFFICE VISIT (OUTPATIENT)
Dept: OBGYN CLINIC | Facility: CLINIC | Age: 44
End: 2019-08-29
Payer: COMMERCIAL

## 2019-08-29 VITALS
WEIGHT: 239 LBS | SYSTOLIC BLOOD PRESSURE: 132 MMHG | HEART RATE: 78 BPM | DIASTOLIC BLOOD PRESSURE: 90 MMHG | HEIGHT: 64 IN | BODY MASS INDEX: 40.8 KG/M2

## 2019-08-29 DIAGNOSIS — M95.8 OSTEOCHONDRAL DEFECT OF TALUS: Primary | ICD-10-CM

## 2019-08-29 PROCEDURE — 99213 OFFICE O/P EST LOW 20 MIN: CPT | Performed by: ORTHOPAEDIC SURGERY

## 2019-08-29 NOTE — PROGRESS NOTES
ANDREW Rome  Attending, Orthopaedic Surgery  [facility] [address] [facility]      ORTHOPAEDIC FOOT AND ANKLE CLINIC VISIT     Assessment:     Encounter Diagnosis   Name Primary? 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  · She would like surgical intervention for a Hemitalar Allograft Left  · A CT Scan of the left ankle was ordered for sizing of the left talus and pre operative process has been initiated  · Once a donor talus is available, we will have 30 days to perform the surgery and use the donor talus  Return if symptoms worsen or fail to improve  History of Present Illness:   Chief Complaint:   Chief Complaint   Patient presents with    Left Ankle - Follow-up     Jackie Ruby is a 53 y o  female who is being seen in follow-up for left OCD defect talar dome  When we last saw she we recommended MRI to evaluate severity of the OCD defect, ankle brace, desensitization information  Pain has not improved  Residual pain is localized at anterior ankle with minimal radiating and described as sharp and severe        Pain/symptom timing:  Worse during the day when active  Pain/symptom context:  Worse with activites and work  Pain/symptom modifying factors:  Rest makes better, activities make worse  Pain/symptom associated signs/symptoms: none    Prior treatment   · NSAIDsYes   · Injections No   · Bracing/Orthotics Yes    · Physical Therapy Yes     Orthopedic Surgical History:   See below    Past Medical, Surgical and Social History:  Past Medical History:  has a past medical history of Anemia, Arthritis, Bronchitis, Chronic pain disorder, History of transfusion, Hypertension, Pneumonia, and Wears glasses  Problem List: does not have any pertinent problems on file  Past Surgical History:  has a past surgical history that includes Cholecystectomy; Tubal ligation; Ankle arthroscopy; Ankle ligament reconstruction (Right); Excision heel spur excision (Right, 2007); Colonoscopy; Breast biopsy (Left, 2016); pr hysteroscopy,w/endometrial ablation (N/A, 4/15/2019); and DILATION AND CURETTAGE OF UTERUS WITH HYSTEROSOCPY (N/A, 4/15/2019)  Family History: family history includes Breast cancer (age of onset: 32) in her cousin; Breast cancer (age of onset: 40) in her paternal grandmother; Breast cancer (age of onset: 48) in her paternal aunt; Cancer in her father  Social History:  reports that she has been smoking cigarettes  She has a 15 00 pack-year smoking history  She has never used smokeless tobacco  She reports that she drinks alcohol  She reports that she does not use drugs  Current Medications: has a current medication list which includes the following prescription(s): celecoxib, docusate sodium, ferrous sulfate, gabapentin, and ibuprofen  Allergies: has No Known Allergies       Review of Systems:  General- denies fever/chills  HEENT- denies hearing loss or sore throat  Eyes- denies eye pain or visual disturbances, denies red eyes  Respiratory- denies cough or SOB  Cardio- denies chest pain or palpitations  GI- denies abdominal pain  Endocrine- denies urinary frequency  Urinary- denies pain with urination  Musculoskeletal- Negative except noted above  Skin- denies rashes or wounds  Neurological- denies dizziness or headache  Psychiatric- denies anxiety or difficulty concentrating    Physical Exam:   /90 (BP Location: Left arm, Patient Position: Sitting, Cuff Size: Adult)   Pulse 78   Ht 5' 4" (1 626 m)   Wt 108 kg (239 lb)   BMI 41 02 kg/m²   General/Constitutional: No apparent distress: well-nourished and well developed  Eyes: normal ocular motion  Lymphatic: No appreciable lymphadenopathy  Respiratory: Non-labored breathing  Vascular: No edema, swelling or tenderness, except as noted in detailed exam   Integumentary: No impressive skin lesions present, except as noted in detailed exam   Neuro: No ataxia or tremors noted  Psych: Normal mood and affect, oriented to person, place and time  Appropriate affect  Musculoskeletal: Normal, except as noted in detailed exam and in HPI  Examination    Left    Gait Antalgic   Musculoskeletal Tender to palpation at medial talar dome    Skin Normal   Well-healed incisions  Nails Normal    Range of Motion  20 degrees dorsiflexion, 40 degrees plantarflexion  Subtalar motion: normal    Stability Stable    Muscle Strength 5/5 tibialis anterior  5/5 gastrocnemius-soleus  5/5 posterior tibialis  5/5 peroneal/eversion strength  5/5 EHL  5/5 FHL    Neurologic Normal    Sensation Intact to light touch throughout sural, saphenous, superficial peroneal, deep peroneal and medial/lateral plantar nerve distributions  [place]-Jered 5 07 filament (10g) testing deferred  Cardiovascular Brisk capillary refill < 2 seconds,intact DP and PT pulses    Special Tests None      Imaging Studies:   MRI available for review of left ankle which demonstrates 13 mm OCD defect medial talar dome with full thickness cartilage fissuring  Reviewed by me personally  Scribe Attestation    I,:   Donavan Valera am acting as a scribe while in the presence of the attending physician :        I,:   Reji Neville MD personally performed the services described in this documentation    as scribed in my presence :            Kathlyne Dunn Lachman, MD  Foot & Ankle Surgery   Department of [address] [place] [address]      I personally performed the service  Kathlyne Dunn Lachman, MD

## 2019-09-11 ENCOUNTER — HOSPITAL ENCOUNTER (OUTPATIENT)
Dept: CT IMAGING | Facility: HOSPITAL | Age: 44
Discharge: HOME | End: 2019-09-11
Attending: ORTHOPAEDIC SURGERY
Payer: COMMERCIAL

## 2019-09-11 DIAGNOSIS — M95.8 OSTEOCHONDRAL DEFECT OF TALUS: ICD-10-CM

## 2019-09-11 PROCEDURE — 73700 CT LOWER EXTREMITY W/O DYE: CPT

## 2019-10-30 ENCOUNTER — OFFICE VISIT (OUTPATIENT)
Dept: URGENT CARE | Facility: CLINIC | Age: 44
End: 2019-10-30
Payer: COMMERCIAL

## 2019-10-30 ENCOUNTER — APPOINTMENT (OUTPATIENT)
Dept: RADIOLOGY | Facility: CLINIC | Age: 44
End: 2019-10-30
Payer: COMMERCIAL

## 2019-10-30 VITALS
OXYGEN SATURATION: 99 % | DIASTOLIC BLOOD PRESSURE: 86 MMHG | WEIGHT: 239 LBS | HEART RATE: 88 BPM | RESPIRATION RATE: 20 BRPM | BODY MASS INDEX: 40.8 KG/M2 | HEIGHT: 64 IN | SYSTOLIC BLOOD PRESSURE: 151 MMHG | TEMPERATURE: 98.8 F

## 2019-10-30 DIAGNOSIS — S59.902A ELBOW INJURY, LEFT, INITIAL ENCOUNTER: ICD-10-CM

## 2019-10-30 DIAGNOSIS — S59.902A ELBOW INJURY, LEFT, INITIAL ENCOUNTER: Primary | ICD-10-CM

## 2019-10-30 PROCEDURE — 99203 OFFICE O/P NEW LOW 30 MIN: CPT | Performed by: PHYSICIAN ASSISTANT

## 2019-10-30 PROCEDURE — 73080 X-RAY EXAM OF ELBOW: CPT

## 2019-10-30 PROCEDURE — S9088 SERVICES PROVIDED IN URGENT: HCPCS | Performed by: PHYSICIAN ASSISTANT

## 2019-10-30 RX ORDER — PREDNISONE 10 MG/1
TABLET ORAL
Qty: 26 TABLET | Refills: 0 | Status: SHIPPED | OUTPATIENT
Start: 2019-10-30 | End: 2019-11-26

## 2019-10-30 NOTE — PROGRESS NOTES
[facility]    NAME: Beryle Marie is a 53 y o  female  : 1975    MRN: [id number]  DATE: 2019  TIME: 3:22 PM    Assessment and Plan   Elbow injury, left, initial encounter [A88 [id number]]  1  Elbow injury, left, initial encounter  XR elbow 3+ vw left    predniSONE 10 mg tablet       Patient Instructions   Patient Instructions   Xray appears negative for any fracture  Will follow up with radiologist report when available  Recommend elevating body part, icing the area every 2 hours for 20-30 minutes, take Prednisone as directed to reduce inflammation  If not improving over the next week, follow up with PCP or orthopedics  Chief Complaint     Chief Complaint   Patient presents with    Arm Pain     fell injuring left elbow 1 week ago       History of Present Illness   76-year-old female here with complaint of left elbow injury  Theodore Varela last week onto her left elbow is having persistent pain  Pain is in the lateral aspect of the elbow  Shoots down with occasional numbness in her fingers at times  Is worse with use  Cannot completely extend her elbow  Review of Systems   Review of Systems   Constitutional: Negative for chills and fever  Respiratory: Negative for cough and shortness of breath  Cardiovascular: Negative for chest pain     Musculoskeletal:        See HPI, left elbow injury       Current Medications     Current Outpatient Medications:     Celecoxib (CELEBREX PO), Take 200 mg by mouth daily as needed , Disp: , Rfl:     docusate sodium (COLACE) 100 mg capsule, Take 1 capsule (100 mg total) by mouth 2 (two) times a day (Patient not taking: Reported on 10/30/2019), Disp: 30 capsule, Rfl: 11    ferrous sulfate 324 (65 Fe) mg, Take 1 tablet (324 mg total) by mouth 2 (two) times a day before meals (Patient not taking: Reported on 10/30/2019), Disp: 30 tablet, Rfl: 11    gabapentin (NEURONTIN) 300 mg capsule, Take 300 mg by mouth 2 (two) times a day as needed , Disp: , Rfl:     ibuprofen (MOTRIN) 600 mg tablet, Take 1 tablet (600 mg total) by mouth every 6 (six) hours as needed for mild pain (cramping) (Patient not taking: Reported on 10/30/2019), Disp: 30 tablet, Rfl: 0    predniSONE 10 mg tablet, Take 3 tabs BID X 2 days, 2 tabs BID X 2 days, 1 tab BID X 2 days, 1 tab daily X 2 days, Disp: 26 tablet, Rfl: 0    Current Allergies     Allergies as of 10/30/2019    (No Known Allergies)          The following portions of the patient's history were reviewed and updated as appropriate: allergies, current medications, past family history, past medical history, past social history, past surgical history and problem list    Past Medical History:   Diagnosis Date    Anemia     Arthritis     Bronchitis     Chronic pain disorder     L ankle    History of transfusion     no adverse reactions- 04/01/2019 last one    Hypertension     being monitored by PCP     Pneumonia     jan/feb 2019    Wears glasses      Past Surgical History:   Procedure Laterality Date    ANKLE ARTHROSCOPY      had 4 ankle surgeries on L foot; 2010;  Oct 2017; Feb 2018    ANKLE LIGAMENT RECONSTRUCTION Right     BREAST BIOPSY Left 2016    clip    CHOLECYSTECTOMY      COLONOSCOPY      DILATION AND CURETTAGE OF UTERUS WITH HYSTEROSOCPY N/A 4/15/2019    Procedure: D&C, HYSTEROSCOPY;  Surgeon: Fanny Glass MD;  Location: [facility];  Service: Gynecology    HEEL SPUR EXCISION Right 2007    [facility] HYSTEROSCOPY,W/ENDOMETRIAL ABLATION N/A 4/15/2019    Procedure: Meghan Salas;  Surgeon: Fanny Glass MD;  Location: [facility];  Service: Gynecology    TUBAL LIGATION       Family History   Problem Relation Age of Onset    Cancer Father     Breast cancer Paternal Grandmother 40    Breast cancer Paternal Aunt 48    Breast cancer Cousin 32     Social History     Socioeconomic History    Marital status: Single     Spouse name: Not on file    Number of children: Not on file    Years of education: Not on file    Highest education level: Not on file   Occupational History    Not on file   Social Needs    Financial resource strain: Not on file    Food insecurity:     Worry: Not on file     Inability: Not on file    Transportation needs:     Medical: Not on file     Non-medical: Not on file   Tobacco Use    Smoking status: Current Every Day Smoker     Packs/day: 1 00     Years: 15 00     Pack years: 15 00     Types: Cigarettes    Smokeless tobacco: Never Used   Substance and Sexual Activity    Alcohol use: Yes     Drinks per session: 3 or 4     Binge frequency: Less than monthly     Comment: weekends    Drug use: No    Sexual activity: Not on file   Lifestyle    Physical activity:     Days per week: Not on file     Minutes per session: Not on file    Stress: Not on file   Relationships    Social connections:     Talks on phone: Not on file     Gets together: Not on file     Attends Buddhist service: Not on file     Active member of club or organization: Not on file     Attends meetings of clubs or organizations: Not on file     Relationship status: Not on file    Intimate partner violence:     Fear of current or ex partner: Not on file     Emotionally abused: Not on file     Physically abused: Not on file     Forced sexual activity: Not on file   Other Topics Concern    Not on file   Social History Narrative    Not on file     Medications have been verified  Objective   /86   Pulse 88   Temp 98 8 °F (37 1 °C) (Tympanic)   Resp 20   Ht 5' 4" (1 626 m)   Wt 108 kg (239 lb)   SpO2 99%   BMI 41 02 kg/m²      Physical Exam   Physical Exam   Constitutional: She appears well-developed and well-nourished  No distress  HENT:   Head: Normocephalic and atraumatic  Cardiovascular: Normal rate, regular rhythm and normal heart sounds  No murmur heard  Pulmonary/Chest: Effort normal and breath sounds normal  No respiratory distress     Musculoskeletal:        Left elbow: She exhibits decreased range of motion (With extension)  She exhibits no swelling, no effusion and no laceration  Tenderness found  Lateral epicondyle tenderness noted  No radial head, no medial epicondyle and no olecranon process tenderness noted  Nursing note and vitals reviewed

## 2019-10-30 NOTE — PATIENT INSTRUCTIONS
Xray appears negative for any fracture  Will follow up with radiologist report when available  Recommend elevating body part, icing the area every 2 hours for 20-30 minutes, take Prednisone as directed to reduce inflammation  If not improving over the next week, follow up with PCP or orthopedics

## 2019-11-24 ENCOUNTER — OFFICE VISIT (OUTPATIENT)
Dept: URGENT CARE | Facility: CLINIC | Age: 44
End: 2019-11-24
Payer: COMMERCIAL

## 2019-11-24 VITALS
TEMPERATURE: 98.4 F | RESPIRATION RATE: 18 BRPM | SYSTOLIC BLOOD PRESSURE: 143 MMHG | WEIGHT: 240 LBS | HEIGHT: 64 IN | BODY MASS INDEX: 40.97 KG/M2 | HEART RATE: 87 BPM | OXYGEN SATURATION: 97 % | DIASTOLIC BLOOD PRESSURE: 87 MMHG

## 2019-11-24 DIAGNOSIS — R22.1 LOCALIZED SWELLING, MASS OR LUMP OF NECK: Primary | ICD-10-CM

## 2019-11-24 PROCEDURE — 99213 OFFICE O/P EST LOW 20 MIN: CPT | Performed by: PHYSICIAN ASSISTANT

## 2019-11-24 PROCEDURE — S9088 SERVICES PROVIDED IN URGENT: HCPCS | Performed by: PHYSICIAN ASSISTANT

## 2019-11-24 RX ORDER — AMOXICILLIN 500 MG/1
500 TABLET, FILM COATED ORAL 3 TIMES DAILY
Qty: 30 TABLET | Refills: 0 | Status: SHIPPED | OUTPATIENT
Start: 2019-11-24 | End: 2019-12-04

## 2019-11-24 NOTE — PROGRESS NOTES
[facility]    NAME: Allyssa Chapin is a 53 y o  female  : 1975    MRN: [id number]  DATE: 2019  TIME: 2:51 PM    Assessment and Plan   Localized swelling, mass or lump of neck [R22 1]  1  Localized swelling, mass or lump of neck  amoxicillin (AMOXIL) 500 MG tablet       Patient Instructions     Patient Instructions   Not quite sure of the etiology of the swelling at the angle of your jaw  This may be a lymphadenitis which is a a swelling or infection of lymph glands  Do not see any specific nidus of infection however  You may continue warm compresses  You may take Advil as needed as long as you are not taking Celebrex  Call primary care provider as soon as possible to arrange follow-up for the next 5-10 days  If you would develop any significant worsening of swelling, fever, difficulty breathing due to swelling and neck please proceed to emergency room for further evaluation  Chief Complaint     Chief Complaint   Patient presents with    Facial Swelling     Patient states she noted yesterday to have right facial swelling  Unable to close jaw fully, and states area feels "hard"  No ear pain, denies any fevers  No trauma to area       History of Present Illness   Allyssa Chapin presents to the clinic c/o  78-year-old female that woke up yesterday morning and was feeling fine but when she went to eat breakfast found that she could not close her mouth all the way due to pain around her jaw  She felt some swelling and hardness behind the angle of her right jaw and just a little bit under the jaw line  Denies any recent illnesses  No nasal congestion or drainage  Denies any dental pain or sore throat  No ear pain  She says the swelling feels like a hard anny  She has taken some Advil and used a heating pad that did help  She does have history of TMJ and wears a   Review of Systems   Review of Systems   Constitutional: Negative  HENT: Negative  Respiratory: Negative  Cardiovascular: Negative  Skin: Negative  Hematological:        Swelling neck at the angle of the jaw on the right       Current Medications     Long-Term Medications   Medication Sig Dispense Refill    docusate sodium (COLACE) 100 mg capsule Take 1 capsule (100 mg total) by mouth 2 (two) times a day (Patient not taking: Reported on 10/30/2019) 30 capsule 11    ferrous sulfate 324 (65 Fe) mg Take 1 tablet (324 mg total) by mouth 2 (two) times a day before meals (Patient not taking: Reported on 10/30/2019) 30 tablet 11    ibuprofen (MOTRIN) 600 mg tablet Take 1 tablet (600 mg total) by mouth every 6 (six) hours as needed for mild pain (cramping) (Patient not taking: Reported on 10/30/2019) 30 tablet 0       Current Allergies     Allergies as of 11/24/2019    (No Known Allergies)          The following portions of the patient's history were reviewed and updated as appropriate: allergies, current medications, past family history, past medical history, past social history, past surgical history and problem list   Past Medical History:   Diagnosis Date    Anemia     Arthritis     Bronchitis     Chronic pain disorder     L ankle    History of transfusion     no adverse reactions- 04/01/2019 last one    Hypertension     being monitored by PCP     Pneumonia     jan/feb 2019    Wears glasses      Past Surgical History:   Procedure Laterality Date    ANKLE ARTHROSCOPY      had 4 ankle surgeries on L foot; 2010;  Oct 2017; Feb 2018    ANKLE LIGAMENT RECONSTRUCTION Right     BREAST BIOPSY Left 2016    clip    CHOLECYSTECTOMY      COLONOSCOPY      DILATION AND CURETTAGE OF UTERUS WITH HYSTEROSOCPY N/A 4/15/2019    Procedure: D&C, HYSTEROSCOPY;  Surgeon: Vikram Travis MD;  Location: [facility];  Service: Gynecology    HEEL SPUR EXCISION Right 2007    [place] HYSTEROSCOPY,W/ENDOMETRIAL ABLATION N/A 4/15/2019    Procedure: Janyth Query;  Surgeon: Vikram Travis MD;  Location: [facility];  Service: Gynecology    TUBAL LIGATION       Family History   Problem Relation Age of Onset    Cancer Father     Breast cancer Paternal Grandmother 40    Breast cancer Paternal Aunt 48    Breast cancer Cousin 26       Objective   /87 (BP Location: Left arm, Patient Position: Sitting)   Pulse 87   Temp 98 4 °F (36 9 °C) (Tympanic)   Resp 18   Ht 5' 4" (1 626 m)   Wt 109 kg (240 lb)   LMP 11/10/2019 (Approximate)   SpO2 97%   BMI 41 20 kg/m²   Patient's last menstrual period was 11/10/2019 (approximate)  Physical Exam     Physical Exam   Constitutional: She is oriented to person, place, and time  She appears well-developed and well-nourished  No distress  HENT:   Head: Normocephalic  Right Ear: External ear normal    Left Ear: External ear normal    Nose: Nose normal    Mouth/Throat: Oropharynx is clear and moist  No oropharyngeal exudate  Eyes: Pupils are equal, round, and reactive to light  EOM are normal  Right eye exhibits no discharge  Left eye exhibits no discharge  No scleral icterus  Neck: Normal range of motion  Neck supple  Palpable fullness, firm tender mass right neck below angle of jaw  Parotid gland nontender to palpation  Submandibular gland nontender to palpation  No supraclavicular lymphadenopathy  No posterior cervical or occipital lymphadenopathy  No preauricular lymphadenopathy  Cardiovascular: Normal rate  Pulmonary/Chest: Effort normal    Neurological: She is alert and oriented to person, place, and time  Skin: Skin is warm and dry  No rash noted  No erythema  Psychiatric: She has a normal mood and affect  Nursing note and vitals reviewed

## 2019-11-24 NOTE — PATIENT INSTRUCTIONS
Not quite sure of the etiology of the swelling at the angle of your jaw  This may be a lymphadenitis which is a a swelling or infection of lymph glands  Do not see any specific nidus of infection however  You may continue warm compresses  You may take Advil as needed as long as you are not taking Celebrex  Call primary care provider as soon as possible to arrange follow-up for the next 5-10 days  If you would develop any significant worsening of swelling, fever, difficulty breathing due to swelling and neck please proceed to emergency room for further evaluation

## 2019-11-26 ENCOUNTER — HOSPITAL ENCOUNTER (EMERGENCY)
Facility: HOSPITAL | Age: 44
Discharge: HOME | End: 2019-11-26
Attending: EMERGENCY MEDICINE | Admitting: EMERGENCY MEDICINE
Payer: COMMERCIAL

## 2019-11-26 VITALS
BODY MASS INDEX: 41.74 KG/M2 | HEART RATE: 97 BPM | TEMPERATURE: 97.5 F | SYSTOLIC BLOOD PRESSURE: 162 MMHG | RESPIRATION RATE: 18 BRPM | WEIGHT: 243.17 LBS | OXYGEN SATURATION: 97 % | DIASTOLIC BLOOD PRESSURE: 58 MMHG

## 2019-11-26 DIAGNOSIS — R22.0 RIGHT FACIAL SWELLING: Primary | ICD-10-CM

## 2019-11-26 PROCEDURE — 99283 EMERGENCY DEPT VISIT LOW MDM: CPT

## 2019-11-26 PROCEDURE — 99283 EMERGENCY DEPT VISIT LOW MDM: CPT | Performed by: EMERGENCY MEDICINE

## 2019-11-26 RX ORDER — IBUPROFEN 600 MG/1
600 TABLET ORAL ONCE
Status: COMPLETED | OUTPATIENT
Start: 2019-11-26 | End: 2019-11-26

## 2019-11-26 RX ADMIN — IBUPROFEN 600 MG: 600 TABLET, FILM COATED ORAL at 17:31

## 2019-11-26 NOTE — DISCHARGE INSTRUCTIONS
Continue antibiotics as already prescribed  Continue ice to area as needed  Add anti-inflammatories such as ibuprofen, 400-600 mg every 6 hours  Follow-up with family doctor    Return to the emergency department if swelling worsens, inability to open/close jaw, difficulty/pain with swallowing, vision changes, neck/throat swelling, etc

## 2020-02-04 ENCOUNTER — OFFICE VISIT (OUTPATIENT)
Dept: URGENT CARE | Facility: MEDICAL CENTER | Age: 45
End: 2020-02-04
Payer: COMMERCIAL

## 2020-02-04 VITALS
OXYGEN SATURATION: 98 % | DIASTOLIC BLOOD PRESSURE: 80 MMHG | HEIGHT: 64 IN | HEART RATE: 82 BPM | TEMPERATURE: 98.7 F | BODY MASS INDEX: 40.12 KG/M2 | SYSTOLIC BLOOD PRESSURE: 152 MMHG | WEIGHT: 235 LBS

## 2020-02-04 DIAGNOSIS — J02.9 SORE THROAT: Primary | ICD-10-CM

## 2020-02-04 LAB — S PYO AG THROAT QL: NEGATIVE

## 2020-02-04 PROCEDURE — 87880 STREP A ASSAY W/OPTIC: CPT | Performed by: PHYSICIAN ASSISTANT

## 2020-02-04 PROCEDURE — 99213 OFFICE O/P EST LOW 20 MIN: CPT | Performed by: PHYSICIAN ASSISTANT

## 2020-02-04 PROCEDURE — S9088 SERVICES PROVIDED IN URGENT: HCPCS | Performed by: PHYSICIAN ASSISTANT

## 2020-02-04 RX ORDER — AMOXICILLIN 500 MG/1
500 CAPSULE ORAL EVERY 8 HOURS SCHEDULED
Qty: 21 CAPSULE | Refills: 0 | Status: SHIPPED | OUTPATIENT
Start: 2020-02-04 | End: 2020-02-06 | Stop reason: ALTCHOICE

## 2020-02-04 NOTE — PROGRESS NOTES
[facility]        NAME: Tu Delcid is a 53 y o  female  : 1975    MRN: [id number]  DATE: 2020  TIME: 3:58 PM    Assessment and Plan   Sore throat [J02 9]  1  Sore throat  POCT rapid strepA    amoxicillin (AMOXIL) 500 mg capsule         Patient Instructions     POCT rapid strep in office was negative  Will treat for strep based off of clinical presentation  Take amoxicillin as prescribed  Take with probiotics or yogurt to prevent yeast infection or stomach upset  Take with food to prevent stomach upset  Follow-up with PCP in 3-5 days if symptoms worsen  Take antibiotics until they are finished  Go to the ER if symptoms worsen  Chief Complaint     Chief Complaint   Patient presents with    Sore Throat     Patient states she started last night with a dwight throat  Patient denies an fever         History of Present Illness       Sore Throat    This is a new problem  The current episode started yesterday  The problem has been gradually worsening  There has been no fever  Associated symptoms include coughing (pt relates she has been coughing all night), ear pain (burning behind both ears) and a hoarse voice  Pertinent negatives include no abdominal pain, congestion, diarrhea, ear discharge, headaches, neck pain, shortness of breath, stridor, trouble swallowing or vomiting  She has had no exposure to strep  She has tried nothing for the symptoms  Review of Systems   Review of Systems   Constitutional: Negative for activity change, appetite change, chills, diaphoresis, fatigue and fever  HENT: Positive for ear pain (burning behind both ears), hoarse voice and sore throat  Negative for congestion, ear discharge, hearing loss, postnasal drip, rhinorrhea, sinus pressure, sinus pain and trouble swallowing  Eyes: Negative for pain, discharge, redness and itching  Respiratory: Positive for cough (pt relates she has been coughing all night)   Negative for chest tightness, shortness of breath, wheezing and stridor  Cardiovascular: Negative for chest pain, palpitations and leg swelling  Gastrointestinal: Negative for abdominal distention, abdominal pain, diarrhea, nausea and vomiting  Musculoskeletal: Negative for arthralgias, myalgias, neck pain and neck stiffness  Skin: Negative for color change, pallor and rash  Neurological: Negative for dizziness, syncope, weakness, light-headedness, numbness and headaches  Current Medications       Current Outpatient Medications:     Celecoxib (CELEBREX PO), Take 200 mg by mouth daily as needed , Disp: , Rfl:     gabapentin (NEURONTIN) 300 mg capsule, Take 300 mg by mouth 2 (two) times a day as needed , Disp: , Rfl:     amoxicillin (AMOXIL) 500 mg capsule, Take 1 capsule (500 mg total) by mouth every 8 (eight) hours for 7 days, Disp: 21 capsule, Rfl: 0    Current Allergies     Allergies as of 02/04/2020    (No Known Allergies)            The following portions of the patient's history were reviewed and updated as appropriate: allergies, current medications, past family history, past medical history, past social history, past surgical history and problem list      Past Medical History:   Diagnosis Date    Anemia     Arthritis     Bronchitis     Chronic pain disorder     L ankle    History of transfusion     no adverse reactions- 04/01/2019 last one    Hypertension     being monitored by PCP     Pneumonia     jan/feb 2019    Wears glasses        Past Surgical History:   Procedure Laterality Date    ANKLE ARTHROSCOPY      had 4 ankle surgeries on L foot; 2010;  Oct 2017; Feb 2018    ANKLE LIGAMENT RECONSTRUCTION Right     BREAST BIOPSY Left 2016    clip    CHOLECYSTECTOMY      COLONOSCOPY      DILATION AND CURETTAGE OF UTERUS WITH HYSTEROSOCPY N/A 4/15/2019    Procedure: D&C, HYSTEROSCOPY;  Surgeon: Martha Gerber MD;  Location: [facility];  Service: Gynecology    HEEL SPUR EXCISION Right 2007    [place] HYSTEROSCOPY,W/ENDOMETRIAL ABLATION N/A 4/15/2019    Procedure: Vidal Denton;  Surgeon: Miranda Bran MD;  Location[facility];  Service: Gynecology    TUBAL LIGATION         Family History   Problem Relation Age of Onset    Cancer Father     Breast cancer Paternal Grandmother 40    Breast cancer Paternal Aunt 48    Breast cancer Cousin 26         Medications have been verified  Objective   /80   Pulse 82   Temp 98 7 °F (37 1 °C)   Ht 5' 4" (1 626 m)   Wt 107 kg (235 lb)   LMP 01/28/2020   SpO2 98%   BMI 40 34 kg/m²        Physical Exam     Physical Exam   Constitutional: She is oriented to person, place, and time  She appears well-developed and well-nourished  Non-toxic appearance  She does not appear ill  No distress  HENT:   Head: Normocephalic and atraumatic  Right Ear: Hearing, tympanic membrane and ear canal normal    Left Ear: Hearing and ear canal normal  There is tenderness  No drainage or swelling  Tympanic membrane is erythematous and bulging  No middle ear effusion  Mouth/Throat: Oropharyngeal exudate (b/l oropharyngeal exudate), posterior oropharyngeal edema and posterior oropharyngeal erythema present  Tonsils are 3+ on the right  Tonsils are 3+ on the left  Tonsillar exudate  Neck: Neck supple  Cardiovascular: Normal rate, regular rhythm, normal heart sounds and intact distal pulses  No murmur heard  Pulmonary/Chest: Effort normal and breath sounds normal    Lymphadenopathy:     She has no cervical adenopathy  Neurological: She is alert and oriented to person, place, and time  Skin: Skin is warm and dry  Psychiatric: She has a normal mood and affect  Her behavior is normal    Nursing note and vitals reviewed

## 2020-02-04 NOTE — PATIENT INSTRUCTIONS
Take amoxicillin as prescribed  Take with probiotics or yogurt to prevent yeast infection or stomach upset  Take with food to prevent stomach upset  Follow-up with PCP in 3-5 days if symptoms worsen  Take antibiotics until they are finished  Go to the ER if symptoms worsen  Sore Throat, Ambulatory Care   GENERAL INFORMATION:   A sore throat  is often caused by a cold or flu virus  A sore throat may also be caused by bacteria such as strep  Other causes include smoking, a runny nose, allergies, or acid reflux  Seek immediate care for the following symptoms:   · Trouble breathing or swallowing because your throat is swollen or sore    · Drooling because it hurts too much to swallow    · A painful lump in your throat that does not go away after 5 days    · A fever higher than 102? F (39?C) or lasts longer than 3 days    · Confusion    · Blood in your throat or ear  Treatment for a sore throat  will depend on the cause how severe it is  A sore throat cause by a virus will go away on its own without treatment  You will need antibiotics if your sore throat is caused by bacteria  Your sore throat should start to feel better within 3 to 5 days for both viral and bacterial infections  Care for your sore throat:   · Gargle with salt water  Mix ¼ teaspoon salt in a glass of warm water and gargle  This may help reduce swelling in your throat  · Take ibuprofen or acetaminophen:  These medicines decrease pain and fever  They are available without a doctor's order  Ask your healthcare provider which medicine is best for you  Ask how much to take and how often to take it  · Drink more liquids  Cold or warm drinks may help soothe your sore throat  Drinking liquids can also help prevent dehydration  · Use a cool-steam humidifier  to help moisten the air in your room and reduce your throat pain  · Use lozenges, ice, soft foods, or popsicles  to soothe your throat  · Rest your throat as much as possible    Try not to use your voice  This may irritate your throat and worsen your symptoms  Follow up with your healthcare provider as directed:  Write down your questions so you remember to ask them during your visits  CARE AGREEMENT:   You have the right to help plan your care  Learn about your health condition and how it may be treated  Discuss treatment options with your caregivers to decide what care you want to receive  You always have the right to refuse treatment  The above information is an  only  It is not intended as medical advice for individual conditions or treatments  Talk to your doctor, nurse or pharmacist before following any medical regimen to see if it is safe and effective for you  © 2014 [address] is for End User's use only and may not be sold, redistributed or otherwise used for commercial purposes  All illustrations and images included in CareNotes® are the copyrighted property of [employer]  or [employer]

## 2020-02-06 ENCOUNTER — HOSPITAL ENCOUNTER (EMERGENCY)
Facility: HOSPITAL | Age: 45
Discharge: HOME | End: 2020-02-06
Attending: EMERGENCY MEDICINE
Payer: COMMERCIAL

## 2020-02-06 ENCOUNTER — APPOINTMENT (EMERGENCY)
Dept: CT IMAGING | Facility: HOSPITAL | Age: 45
End: 2020-02-06
Payer: COMMERCIAL

## 2020-02-06 VITALS
BODY MASS INDEX: 40.12 KG/M2 | OXYGEN SATURATION: 97 % | RESPIRATION RATE: 18 BRPM | HEIGHT: 64 IN | TEMPERATURE: 97 F | DIASTOLIC BLOOD PRESSURE: 89 MMHG | WEIGHT: 235 LBS | HEART RATE: 96 BPM | SYSTOLIC BLOOD PRESSURE: 129 MMHG

## 2020-02-06 DIAGNOSIS — J02.9 PHARYNGITIS, UNSPECIFIED ETIOLOGY: Primary | ICD-10-CM

## 2020-02-06 LAB
ALBUMIN SERPL BCP-MCNC: 4.5 G/DL (ref 3.5–5.7)
ALP SERPL-CCNC: 55 U/L (ref 40–150)
ALT SERPL W P-5'-P-CCNC: 13 U/L (ref 7–52)
ANION GAP SERPL CALCULATED.3IONS-SCNC: 12 MMOL/L (ref 4–13)
AST SERPL W P-5'-P-CCNC: 10 U/L (ref 13–39)
BASOPHILS # BLD AUTO: 0.1 THOUSANDS/ΜL (ref 0–0.1)
BASOPHILS NFR BLD AUTO: 1 % (ref 0–2)
BILIRUB SERPL-MCNC: 0.5 MG/DL (ref 0.2–1)
BUN SERPL-MCNC: 8 MG/DL (ref 7–25)
CALCIUM SERPL-MCNC: 10.2 MG/DL (ref 8.6–10.5)
CHLORIDE SERPL-SCNC: 101 MMOL/L (ref 98–107)
CO2 SERPL-SCNC: 25 MMOL/L (ref 21–31)
CREAT SERPL-MCNC: 0.5 MG/DL (ref 0.6–1.2)
EOSINOPHIL # BLD AUTO: 0.2 THOUSAND/ΜL (ref 0–0.61)
EOSINOPHIL NFR BLD AUTO: 2 % (ref 0–5)
ERYTHROCYTE [DISTWIDTH] IN BLOOD BY AUTOMATED COUNT: 14.9 % (ref 11.5–14.5)
GFR SERPL CREATININE-BSD FRML MDRD: 116 ML/MIN/1.73SQ M
GLUCOSE SERPL-MCNC: 110 MG/DL (ref 65–99)
HCT VFR BLD AUTO: 42.7 % (ref 42–47)
HGB BLD-MCNC: 13.8 G/DL (ref 12–16)
LYMPHOCYTES # BLD AUTO: 2.5 THOUSANDS/ΜL (ref 0.6–4.47)
LYMPHOCYTES NFR BLD AUTO: 20 % (ref 21–51)
MCH RBC QN AUTO: 26.7 PG (ref 26–34)
MCHC RBC AUTO-ENTMCNC: 32.3 G/DL (ref 31–37)
MCV RBC AUTO: 83 FL (ref 81–99)
MONOCYTES # BLD AUTO: 0.9 THOUSAND/ΜL (ref 0.17–1.22)
MONOCYTES NFR BLD AUTO: 7 % (ref 2–12)
NEUTROPHILS # BLD AUTO: 8.9 THOUSANDS/ΜL (ref 1.4–6.5)
NEUTS SEG NFR BLD AUTO: 71 % (ref 42–75)
PLATELET # BLD AUTO: 230 THOUSANDS/UL (ref 149–390)
PMV BLD AUTO: 8 FL (ref 8.6–11.7)
POTASSIUM SERPL-SCNC: 3.8 MMOL/L (ref 3.5–5.5)
PROT SERPL-MCNC: 7.9 G/DL (ref 6.4–8.9)
RBC # BLD AUTO: 5.16 MILLION/UL (ref 3.9–5.2)
SODIUM SERPL-SCNC: 138 MMOL/L (ref 134–143)
WBC # BLD AUTO: 12.5 THOUSAND/UL (ref 4.8–10.8)

## 2020-02-06 PROCEDURE — 70491 CT SOFT TISSUE NECK W/DYE: CPT

## 2020-02-06 PROCEDURE — 99284 EMERGENCY DEPT VISIT MOD MDM: CPT | Performed by: EMERGENCY MEDICINE

## 2020-02-06 PROCEDURE — 99284 EMERGENCY DEPT VISIT MOD MDM: CPT

## 2020-02-06 PROCEDURE — 36415 COLL VENOUS BLD VENIPUNCTURE: CPT | Performed by: EMERGENCY MEDICINE

## 2020-02-06 PROCEDURE — 85025 COMPLETE CBC W/AUTO DIFF WBC: CPT | Performed by: EMERGENCY MEDICINE

## 2020-02-06 PROCEDURE — 80053 COMPREHEN METABOLIC PANEL: CPT | Performed by: EMERGENCY MEDICINE

## 2020-02-06 RX ORDER — ACETAMINOPHEN 325 MG/1
650 TABLET ORAL ONCE
Status: COMPLETED | OUTPATIENT
Start: 2020-02-06 | End: 2020-02-06

## 2020-02-06 RX ORDER — AMOXICILLIN AND CLAVULANATE POTASSIUM 875; 125 MG/1; MG/1
1 TABLET, FILM COATED ORAL EVERY 12 HOURS
Qty: 14 TABLET | Refills: 0 | Status: SHIPPED | OUTPATIENT
Start: 2020-02-06 | End: 2020-02-13

## 2020-02-06 RX ORDER — DEXAMETHASONE 4 MG/1
4 TABLET ORAL ONCE
Status: COMPLETED | OUTPATIENT
Start: 2020-02-06 | End: 2020-02-06

## 2020-02-06 RX ORDER — AMOXICILLIN AND CLAVULANATE POTASSIUM 875; 125 MG/1; MG/1
1 TABLET, FILM COATED ORAL ONCE
Status: COMPLETED | OUTPATIENT
Start: 2020-02-06 | End: 2020-02-06

## 2020-02-06 RX ADMIN — IOHEXOL 85 ML: 350 INJECTION, SOLUTION INTRAVENOUS at 17:06

## 2020-02-06 RX ADMIN — AMOXICILLIN AND CLAVULANATE POTASSIUM 1 TABLET: 875; 125 TABLET, FILM COATED ORAL at 17:50

## 2020-02-06 RX ADMIN — ACETAMINOPHEN 650 MG: 325 TABLET ORAL at 17:50

## 2020-02-06 RX ADMIN — DEXAMETHASONE 4 MG: 4 TABLET ORAL at 17:50

## 2020-02-06 NOTE — ED PROVIDER NOTES
History  Chief Complaint   Patient presents with    Sore Throat     diagnosed with strep, on amoxicillin x4 days  reports worsened pain, more white patches, and a hot sensation in throat when swallowing     This is a 24-year-old female chief complaint sore throat and difficulty swallowing  She states that symptoms started around Monday  She was seen at the urgent care shortly after worse he started on amoxicillin  Since that point she has had increasing fatigue, chills, difficulty swallowing  She feels like her back of her throat is closing and it is making difficult overt for her to consume food or drinks  She says that it is associated with an extremely sharp pain  States that last night she was having severe difficulty swallowing her own spit  Patient has been taking her amoxicillin that was prescribed at the urgent care  States this feels different than typical strep throat presentations  She is clean she has trismus on the left side  She states she is having tenderness to palpation on the left superior aspect of her neck  Prior to Admission Medications   Prescriptions Last Dose Informant Patient Reported? Taking? Celecoxib (CELEBREX PO)   Yes No   Sig: Take 200 mg by mouth daily as needed    gabapentin (NEURONTIN) 300 mg capsule   Yes No   Sig: Take 300 mg by mouth 2 (two) times a day as needed       Facility-Administered Medications: None       Past Medical History:   Diagnosis Date    Anemia     Arthritis     Bronchitis     Chronic pain disorder     L ankle    History of transfusion     no adverse reactions- 04/01/2019 last one    Hypertension     being monitored by PCP     Pneumonia     jan/feb 2019    Wears glasses        Past Surgical History:   Procedure Laterality Date    ANKLE ARTHROSCOPY      had 4 ankle surgeries on L foot; 2010;  Oct 2017; Feb 2018    ANKLE LIGAMENT RECONSTRUCTION Right     BREAST BIOPSY Left 2016    clip    CHOLECYSTECTOMY      COLONOSCOPY      DILATION AND CURETTAGE OF UTERUS WITH HYSTEROSOCPY N/A 4/15/2019    Procedure: D&C, HYSTEROSCOPY;  Surgeon: Chucky Frankel, MD;  [facility];  Service: Gynecology    HEEL SPUR EXCISION Right 2007    [place] HYSTEROSCOPY,W/ENDOMETRIAL ABLATION N/A 4/15/2019    Procedure: Geovanna Aguialr;  Surgeon: Chucky Frankel, MD;  [facility];  Service: Gynecology    TUBAL LIGATION         Family History   Problem Relation Age of Onset    Cancer Father     Breast cancer Paternal Grandmother 40    Breast cancer Paternal Aunt 48    Breast cancer Cousin 32     I have reviewed and agree with the history as documented  Social History     Tobacco Use    Smoking status: Current Every Day Smoker     Packs/day: 0 50     Years: 15 00     Pack years: 7 50     Types: Cigarettes    Smokeless tobacco: Never Used   Substance Use Topics    Alcohol use: Yes     Drinks per session: 3 or 4     Binge frequency: Less than monthly     Comment: weekends    Drug use: No        Review of Systems   Constitutional: Positive for chills and fatigue  Negative for activity change and fever  HENT: Positive for sore throat and trouble swallowing  Negative for congestion and voice change  Eyes: Negative for visual disturbance  Respiratory: Positive for choking  Negative for cough, chest tightness and shortness of breath  Cardiovascular: Negative for chest pain  Gastrointestinal: Negative for abdominal pain, diarrhea and vomiting  Genitourinary: Negative for dysuria  Skin: Negative for rash  Neurological: Negative for dizziness, weakness and numbness  Physical Exam  Physical Exam   Constitutional: She is oriented to person, place, and time  She appears well-developed and well-nourished  HENT:   Head: Normocephalic and atraumatic  Mouth/Throat: Uvula is midline  Tonsils are 3+ on the right  Tonsils are 3+ on the left  Tonsillar exudate     Foul-smelling breath   Eyes: Pupils are equal, round, and reactive to light  Conjunctivae and EOM are normal    Neck: Normal range of motion  Neck supple  Cardiovascular: Normal rate, regular rhythm and normal heart sounds  Pulmonary/Chest: Effort normal and breath sounds normal  No respiratory distress  Abdominal: Soft  Bowel sounds are normal    Musculoskeletal: Normal range of motion  Neurological: She is alert and oriented to person, place, and time  Skin: Skin is warm and dry  Capillary refill takes less than 2 seconds  Psychiatric: She has a normal mood and affect   Her behavior is normal        Vital Signs  ED Triage Vitals   Temperature Pulse Respirations Blood Pressure SpO2   02/06/20 1511 02/06/20 1511 02/06/20 1511 02/06/20 1511 02/06/20 1511   (!) 97 °F (36 1 °C) 94 18 140/80 96 %      Temp Source Heart Rate Source Patient Position - Orthostatic VS BP Location FiO2 (%)   02/06/20 1511 -- 02/06/20 1827 02/06/20 1827 --   Temporal  Sitting Left arm       Pain Score       02/06/20 1511       9           Vitals:    02/06/20 1511 02/06/20 1827   BP: 140/80 129/89   Pulse: 94 96   Patient Position - Orthostatic VS:  Sitting         Visual Acuity      ED Medications  Medications   iohexol (OMNIPAQUE) 350 MG/ML injection (MULTI-DOSE) 85 mL (85 mL Intravenous Given 2/6/20 1706)   acetaminophen (TYLENOL) tablet 650 mg (650 mg Oral Given 2/6/20 1750)   amoxicillin-clavulanate (AUGMENTIN) 875-125 mg per tablet 1 tablet (1 tablet Oral Given 2/6/20 1750)   dexamethasone (DECADRON) tablet 4 mg (4 mg Oral Given 2/6/20 1750)       Diagnostic Studies  Results Reviewed     Procedure Component Value Units Date/Time    Comprehensive metabolic panel [[id number]]  (Abnormal) Collected:  02/06/20 1617    Lab Status:  Final result Specimen:  Blood from Arm, Right Updated:  02/06/20 1651     Sodium 138 mmol/L      Potassium 3 8 mmol/L      Chloride 101 mmol/L      CO2 25 mmol/L      ANION GAP 12 mmol/L      BUN 8 mg/dL      Creatinine 0 50 mg/dL      Glucose 110 mg/dL Calcium 10 2 mg/dL      AST 10 U/L      ALT 13 U/L      Alkaline Phosphatase 55 U/L      Total Protein 7 9 g/dL      Albumin 4 5 g/dL      Total Bilirubin 0 50 mg/dL      eGFR 116 ml/min/1 73sq m     Narrative:       [place] guidelines for Chronic Kidney Disease (CKD):     Stage 1 with normal or high GFR (GFR > 90 mL/min/1 73 square meters)    Stage 2 Mild CKD (GFR = 60-89 mL/min/1 73 square meters)    Stage 3A Moderate CKD (GFR = 45-59 mL/min/1 73 square meters)    Stage 3B Moderate CKD (GFR = 30-44 mL/min/1 73 square meters)    Stage 4 Severe CKD (GFR = 15-29 mL/min/1 73 square meters)    Stage 5 End Stage CKD (GFR <15 mL/min/1 73 square meters)  Note: GFR calculation is accurate only with a steady state creatinine    CBC and differential [[id number]]  (Abnormal) Collected:  02/06/20 1552    Lab Status:  Final result Specimen:  Blood from Arm, Right Updated:  02/06/20 1605     WBC 12 50 Thousand/uL      RBC 5 16 Million/uL      Hemoglobin 13 8 g/dL      Hematocrit 42 7 %      MCV 83 fL      MCH 26 7 pg      MCHC 32 3 g/dL      RDW 14 9 %      MPV 8 0 fL      Platelets 719 Thousands/uL      Neutrophils Relative 71 %      Lymphocytes Relative 20 %      Monocytes Relative 7 %      Eosinophils Relative 2 %      Basophils Relative 1 %      Neutrophils Absolute 8 90 Thousands/µL      Lymphocytes Absolute 2 50 Thousands/µL      Monocytes Absolute 0 90 Thousand/µL      Eosinophils Absolute 0 20 Thousand/µL      Basophils Absolute 0 10 Thousands/µL                  CT soft tissue neck with contrast   Final Result by Kaya Diego MD (02/06 1730)      Mild diffuse adenoidal and tonsillar swelling without a peritonsillar abscess  Normal appearance of the epiglottis  The study was marked in [facility] for immediate notification        Workstation performed: [id number]                    Procedures  Procedures         ED Course                               MDM  Number of Diagnoses or Management Options  Pharyngitis, unspecified etiology: new and requires workup  Diagnosis management comments: This is a 57-year-old female with pharyngitis causing significant discomfort  Due to the patient stating that she is having some feeling of throat enlargement and difficulty swallowing previous evening CT scan was completed  This did not demonstrate any abscess or closing of the airway  Patient was able to tolerate p o  Without difficulty in the emergency department  Patient appeared clinically well  Patient discharged home with Augmentin to replace the amoxicillin  Discussed warning signs and symptoms with the patient as well as when to return to the emergency department versus follow up with PC P  Patient states understanding and agreement with the plan  Amount and/or Complexity of Data Reviewed  Clinical lab tests: ordered and reviewed  Tests in the radiology section of CPT®: ordered and reviewed          Disposition  Final diagnoses:   Pharyngitis, unspecified etiology     Time reflects when diagnosis was documented in both MDM as applicable and the Disposition within this note     Time User Action Codes Description Comment    2/6/2020  5:39 PM Corbin Inocencia Add [J02 9] Pharyngitis, unspecified etiology       ED Disposition     ED Disposition Condition Date/Time Comment    Discharge Stable u Feb 6, 2020  5:39 PM Berto Swan discharge to home/self care              Follow-up Information     Follow up With Specialties Details Why Contact Info    Deepak Soni MD Family Medicine In 1 day  [facility] [id number] [phone]            Discharge Medication List as of 2/6/2020  5:40 PM      START taking these medications    Details   amoxicillin-clavulanate (AUGMENTIN) 875-125 mg per tablet Take 1 tablet by mouth every 12 (twelve) hours for 7 days, Starting Thu 2/6/2020, Until Thu 2/13/2020, Normal         CONTINUE these medications which have NOT CHANGED    Details Celecoxib (CELEBREX PO) Take 200 mg by mouth daily as needed , Historical Med      gabapentin (NEURONTIN) 300 mg capsule Take 300 mg by mouth 2 (two) times a day as needed , Historical Med           No discharge procedures on file      ED Provider  Electronically Signed by           Gume Pastor MD  02/07/20 7122

## 2020-02-14 ENCOUNTER — OFFICE VISIT (OUTPATIENT)
Dept: URGENT CARE | Facility: CLINIC | Age: 45
End: 2020-02-14
Payer: COMMERCIAL

## 2020-02-14 VITALS
OXYGEN SATURATION: 96 % | DIASTOLIC BLOOD PRESSURE: 80 MMHG | HEART RATE: 102 BPM | RESPIRATION RATE: 16 BRPM | WEIGHT: 239.4 LBS | SYSTOLIC BLOOD PRESSURE: 120 MMHG | TEMPERATURE: 98.1 F | BODY MASS INDEX: 41.09 KG/M2

## 2020-02-14 DIAGNOSIS — K13.0 ANGULAR CHEILITIS: Primary | ICD-10-CM

## 2020-02-14 PROCEDURE — S9088 SERVICES PROVIDED IN URGENT: HCPCS | Performed by: FAMILY MEDICINE

## 2020-02-14 PROCEDURE — 99213 OFFICE O/P EST LOW 20 MIN: CPT | Performed by: FAMILY MEDICINE

## 2020-02-14 NOTE — PATIENT INSTRUCTIONS
F/u here as needed  F/u with PCP if no PCP if no improvement  Begin using bactroban oint  Use zinc oxide in between

## 2020-02-14 NOTE — PROGRESS NOTES
NAME: Dino Busby is a 53 y o  female  : 1975    MRN: [id number]      Assessment and Plan   Angular cheilitis [K13 0]  1  Angular cheilitis  mupirocin (BACTROBAN) 2 % ointment           Patient Instructions   Patient Instructions   F/u here as needed  F/u with PCP if no PCP if no improvement  Begin using bactroban oint  Use zinc oxide in between  Proceed to ER if symptoms worsen  Chief Complaint     Chief Complaint   Patient presents with    Mouth Lesions     pt reports  in last 2 weeks she has been on abx for sore throat; today presents with cold sores on both sides of her lower lip         History of Present Illness   Here c/o cold sores  Had strep last week- took amox, then started on augmentin  She was drooling  Woke up with cold sores  Getting worse  Hurting  Tried vaseline, carmex  no improvement, aquafor/bacitracin      Review of Systems   Review of Systems   Constitutional: Negative for fatigue and fever  HENT: Negative for ear pain, sore throat and trouble swallowing  Eyes: Negative for visual disturbance  Respiratory: Negative for chest tightness and shortness of breath  Cardiovascular: Negative for chest pain  Gastrointestinal: Negative for abdominal pain, diarrhea, nausea and vomiting  Genitourinary: Negative for difficulty urinating and dysuria  Skin: Positive for wound  Negative for rash  Neurological: Negative for weakness           Current Medications       Current Outpatient Medications:     Celecoxib (CELEBREX PO), Take 200 mg by mouth daily as needed , Disp: , Rfl:     gabapentin (NEURONTIN) 300 mg capsule, Take 300 mg by mouth 2 (two) times a day as needed , Disp: , Rfl:     mupirocin (BACTROBAN) 2 % ointment, Apply topically 3 (three) times a day, Disp: 22 g, Rfl: 0    Current Allergies     Allergies as of 2020    (No Known Allergies)              Past Medical History:   Diagnosis Date    Anemia     Arthritis     Bronchitis     Chronic pain disorder     L ankle    History of transfusion     no adverse reactions- 04/01/2019 last one    Hypertension     being monitored by PCP     Pneumonia     jan/feb 2019    Wears glasses        Past Surgical History:   Procedure Laterality Date    ANKLE ARTHROSCOPY      had 4 ankle surgeries on L foot; 2010; Oct 2017; Feb 2018    ANKLE LIGAMENT RECONSTRUCTION Right     BREAST BIOPSY Left 2016    clip    CHOLECYSTECTOMY      COLONOSCOPY      DILATION AND CURETTAGE OF UTERUS WITH HYSTEROSOCPY N/A 4/15/2019    Procedure: D&C, HYSTEROSCOPY;  Surgeon: Jihan Ford MD;  [facility];  Service: Gynecology    HEEL SPUR EXCISION Right 2007    [place] HYSTEROSCOPY,W/ENDOMETRIAL ABLATION N/A 4/15/2019    Procedure: Luisland Yaron;  Surgeon: Jihan Ford MD;  [facility];  Service: Gynecology    TUBAL LIGATION         Family History   Problem Relation Age of Onset    Cancer Father     Breast cancer Paternal Grandmother 40    Breast cancer Paternal Aunt 48    Breast cancer Cousin 26         Medications have been verified  The following portions of the patient's history were reviewed and updated as appropriate: allergies, current medications, past family history, past medical history, past social history, past surgical history and problem list     Objective   /80   Pulse 102   Temp 98 1 °F (36 7 °C) (Tympanic)   Resp 16   Wt 109 kg (239 lb 6 4 oz)   LMP 01/28/2020   SpO2 96%   BMI 41 09 kg/m²      Physical Exam     Physical Exam   Constitutional: She is oriented to person, place, and time  She appears well-developed and well-nourished  HENT:   Head: Normocephalic  Angular cheilitis-dry cracked   Eyes: EOM are normal    Neck: Normal range of motion  Cardiovascular: Normal rate, regular rhythm and normal heart sounds  Exam reveals no gallop and no friction rub  No murmur heard  Pulmonary/Chest: Effort normal and breath sounds normal  No respiratory distress  She has no wheezes  She has no rales  Abdominal: Soft  Bowel sounds are normal  She exhibits no distension  There is no tenderness  There is no rebound and no guarding  Musculoskeletal: Normal range of motion  Neurological: She is oriented to person, place, and time  Skin: Skin is warm and dry  No rash noted  Psychiatric: She has a normal mood and affect  Thought content normal    Nursing note and vitals reviewed

## 2020-08-11 ENCOUNTER — OFFICE VISIT (OUTPATIENT)
Dept: URGENT CARE | Facility: CLINIC | Age: 45
End: 2020-08-11
Payer: MEDICARE

## 2020-08-11 VITALS
DIASTOLIC BLOOD PRESSURE: 74 MMHG | HEIGHT: 64 IN | HEART RATE: 96 BPM | WEIGHT: 240 LBS | TEMPERATURE: 96.6 F | OXYGEN SATURATION: 97 % | BODY MASS INDEX: 40.97 KG/M2 | RESPIRATION RATE: 20 BRPM | SYSTOLIC BLOOD PRESSURE: 130 MMHG

## 2020-08-11 DIAGNOSIS — J02.0 STREP THROAT: Primary | ICD-10-CM

## 2020-08-11 LAB — S PYO AG THROAT QL: POSITIVE

## 2020-08-11 PROCEDURE — G0463 HOSPITAL OUTPT CLINIC VISIT: HCPCS | Performed by: PHYSICIAN ASSISTANT

## 2020-08-11 PROCEDURE — 99213 OFFICE O/P EST LOW 20 MIN: CPT | Performed by: PHYSICIAN ASSISTANT

## 2020-08-11 PROCEDURE — 87880 STREP A ASSAY W/OPTIC: CPT | Performed by: PHYSICIAN ASSISTANT

## 2020-08-11 RX ORDER — AMOXICILLIN AND CLAVULANATE POTASSIUM 875; 125 MG/1; MG/1
1 TABLET, FILM COATED ORAL EVERY 12 HOURS SCHEDULED
Qty: 20 TABLET | Refills: 0 | Status: SHIPPED | OUTPATIENT
Start: 2020-08-11 | End: 2020-08-21

## 2020-08-11 NOTE — PROGRESS NOTES
[facility]        NAME: Summer Garcia is a 50 y o  female  : 1975    MRN: [id number]  DATE: 2020  TIME: 4:55 PM    Assessment and Plan   Strep throat [J02 0]  1  Strep throat  POCT rapid strepA    amoxicillin-clavulanate (AUGMENTIN) 875-125 mg per tablet         Patient Instructions     Patient Instructions   -Rapid strep test was positive  -Take antibiotic as directed  -Use tylenol/motrin as directed  -Increase fluids  -Salt H20 gargles/throat lozenges  -Change toothbrush  -Follow-up with PCP within 5-7 days    Go to ER with worsening symptoms, worsening pain, high fever, difficulty swallowing/drooling, or any signs of distress     Follow up with PCP in 3-5 days  Proceed to  ER if symptoms worsen  Chief Complaint     Chief Complaint   Patient presents with    Sore Throat     started Sun  also with fever and chills  History of Present Illness       Patient is a 78-year-old female who presents today for evaluation of a sore throat that started on   Patient also states she had a fever and had chills  Patient rates her sore throat as a 9/10  Patient states that she had strep throat few months ago and the only medication that worked for her was Augmentin  She denies any sick contacts that she is aware of  Review of Systems   Review of Systems   Constitutional: Positive for chills and fever  HENT: Positive for sore throat  Respiratory: Negative for shortness of breath  Cardiovascular: Negative for chest pain  Musculoskeletal: Negative for arthralgias  Neurological: Negative for headaches  All other systems reviewed and are negative          Current Medications       Current Outpatient Medications:     amoxicillin-clavulanate (AUGMENTIN) 875-125 mg per tablet, Take 1 tablet by mouth every 12 (twelve) hours for 10 days, Disp: 20 tablet, Rfl: 0    Celecoxib (CELEBREX PO), Take 200 mg by mouth daily as needed , Disp: , Rfl:     gabapentin (NEURONTIN) 300 mg capsule, Take 300 mg by mouth 2 (two) times a day as needed , Disp: , Rfl:     Current Allergies     Allergies as of 08/11/2020    (No Known Allergies)            The following portions of the patient's history were reviewed and updated as appropriate: allergies, current medications, past family history, past medical history, past social history, past surgical history and problem list      Past Medical History:   Diagnosis Date    Anemia     Arthritis     Bronchitis     Chronic pain disorder     L ankle    History of transfusion     no adverse reactions- 04/01/2019 last one    Hypertension     being monitored by PCP     Pneumonia     jan/feb 2019    Wears glasses        Past Surgical History:   Procedure Laterality Date    ANKLE ARTHROSCOPY      had 4 ankle surgeries on L foot; 2010; Oct 2017; Feb 2018    ANKLE LIGAMENT RECONSTRUCTION Right     BREAST BIOPSY Left 2016    clip    CHOLECYSTECTOMY      COLONOSCOPY      DILATION AND CURETTAGE OF UTERUS WITH HYSTEROSOCPY N/A 4/15/2019    Procedure: D&C, HYSTEROSCOPY;  Surgeon: Yoandy Patino MD;  [facility];  Service: Gynecology    HEEL SPUR EXCISION Right 2007    IN HYSTEROSCOPY,W/ENDOMETRIAL ABLATION N/A 4/15/2019    Procedure: Teri Cousins;  Surgeon: Yoandy Patino MD;  [facility];  Service: Gynecology    TUBAL LIGATION         Family History   Problem Relation Age of Onset    Cancer Father     Breast cancer Paternal Grandmother 40    Breast cancer Paternal Aunt 48    Breast cancer Cousin 26         Medications have been verified  Objective   /74 (BP Location: Left arm, Patient Position: Sitting)   Pulse 96   Temp (!) 96 6 °F (35 9 °C) (Temporal)   Resp 20   Ht 5' 4" (1 626 m)   Wt 109 kg (240 lb)   SpO2 97%   BMI 41 20 kg/m²        Physical Exam     Physical Exam  Vitals signs and nursing note reviewed  Constitutional:       Appearance: Normal appearance     HENT:      Head: Normocephalic and atraumatic  Right Ear: Tympanic membrane, ear canal and external ear normal       Left Ear: Tympanic membrane, ear canal and external ear normal       Nose: Nose normal       Mouth/Throat:      Mouth: Mucous membranes are moist       Pharynx: Posterior oropharyngeal erythema present  Tonsils: Tonsillar exudate present  1+ on the right  1+ on the left  Eyes:      Extraocular Movements: Extraocular movements intact  Conjunctiva/sclera: Conjunctivae normal       Pupils: Pupils are equal, round, and reactive to light  Neck:      Musculoskeletal: Normal range of motion and neck supple  Cardiovascular:      Rate and Rhythm: Normal rate and regular rhythm  Pulmonary:      Effort: Pulmonary effort is normal       Breath sounds: Normal breath sounds  Lymphadenopathy:      Cervical: Cervical adenopathy present  Skin:     General: Skin is warm and dry  Neurological:      General: No focal deficit present  Mental Status: She is alert and oriented to person, place, and time     Psychiatric:         Mood and Affect: Mood normal

## 2020-08-11 NOTE — PATIENT INSTRUCTIONS
-Rapid strep test was positive  -Take antibiotic as directed  -Use tylenol/motrin as directed  -Increase fluids  -Salt H20 gargles/throat lozenges  -Change toothbrush  -Follow-up with PCP within 5-7 days    Go to ER with worsening symptoms, worsening pain, high fever, difficulty swallowing/drooling, or any signs of distress

## 2020-11-23 ENCOUNTER — TELEPHONE (OUTPATIENT)
Dept: OBGYN CLINIC | Facility: HOSPITAL | Age: 45
End: 2020-11-23

## 2021-04-06 ENCOUNTER — TELEPHONE (OUTPATIENT)
Dept: FAMILY MEDICINE CLINIC | Facility: CLINIC | Age: 46
End: 2021-04-06

## 2021-04-08 ENCOUNTER — TELEPHONE (OUTPATIENT)
Dept: FAMILY MEDICINE CLINIC | Facility: CLINIC | Age: 46
End: 2021-04-08

## 2021-04-08 NOTE — TELEPHONE ENCOUNTER
The patient was called and was scheduled for a Covid-19 Vaccine Initial Appt  Scheduling details left on voicemail

## 2021-05-09 ENCOUNTER — IMMUNIZATIONS (OUTPATIENT)
Dept: FAMILY MEDICINE CLINIC | Facility: HOSPITAL | Age: 46
End: 2021-05-09

## 2021-05-09 DIAGNOSIS — Z23 ENCOUNTER FOR IMMUNIZATION: Primary | ICD-10-CM

## 2021-05-09 PROCEDURE — 91300 SARS-COV-2 / COVID-19 MRNA VACCINE (PFIZER-BIONTECH) 30 MCG: CPT

## 2021-05-09 PROCEDURE — 0001A SARS-COV-2 / COVID-19 MRNA VACCINE (PFIZER-BIONTECH) 30 MCG: CPT

## 2021-06-02 ENCOUNTER — IMMUNIZATIONS (OUTPATIENT)
Dept: FAMILY MEDICINE CLINIC | Facility: HOSPITAL | Age: 46
End: 2021-06-02

## 2021-06-02 DIAGNOSIS — Z23 ENCOUNTER FOR IMMUNIZATION: Primary | ICD-10-CM

## 2021-06-02 PROCEDURE — 0002A SARS-COV-2 / COVID-19 MRNA VACCINE (PFIZER-BIONTECH) 30 MCG: CPT

## 2021-06-02 PROCEDURE — 91300 SARS-COV-2 / COVID-19 MRNA VACCINE (PFIZER-BIONTECH) 30 MCG: CPT

## 2022-07-30 ENCOUNTER — APPOINTMENT (EMERGENCY)
Dept: CT IMAGING | Facility: HOSPITAL | Age: 47
End: 2022-07-30
Payer: MEDICARE

## 2022-07-30 ENCOUNTER — HOSPITAL ENCOUNTER (EMERGENCY)
Facility: HOSPITAL | Age: 47
Discharge: HOME | End: 2022-07-30
Attending: FAMILY MEDICINE
Payer: MEDICARE

## 2022-07-30 VITALS
DIASTOLIC BLOOD PRESSURE: 94 MMHG | WEIGHT: 250 LBS | HEIGHT: 64 IN | HEART RATE: 84 BPM | RESPIRATION RATE: 18 BRPM | OXYGEN SATURATION: 98 % | BODY MASS INDEX: 42.68 KG/M2 | SYSTOLIC BLOOD PRESSURE: 130 MMHG | TEMPERATURE: 98.3 F

## 2022-07-30 DIAGNOSIS — M54.50 ACUTE EXACERBATION OF CHRONIC LOW BACK PAIN: Primary | ICD-10-CM

## 2022-07-30 DIAGNOSIS — M54.10 RADICULOPATHY: ICD-10-CM

## 2022-07-30 DIAGNOSIS — G89.29 ACUTE EXACERBATION OF CHRONIC LOW BACK PAIN: Primary | ICD-10-CM

## 2022-07-30 PROCEDURE — 72131 CT LUMBAR SPINE W/O DYE: CPT

## 2022-07-30 PROCEDURE — 99284 EMERGENCY DEPT VISIT MOD MDM: CPT | Performed by: FAMILY MEDICINE

## 2022-07-30 PROCEDURE — 99284 EMERGENCY DEPT VISIT MOD MDM: CPT

## 2022-07-30 PROCEDURE — 96372 THER/PROPH/DIAG INJ SC/IM: CPT

## 2022-07-30 PROCEDURE — G1004 CDSM NDSC: HCPCS

## 2022-07-30 RX ORDER — METHOCARBAMOL 500 MG/1
500 TABLET, FILM COATED ORAL 2 TIMES DAILY
Qty: 20 TABLET | Refills: 0 | Status: SHIPPED | OUTPATIENT
Start: 2022-07-30

## 2022-07-30 RX ORDER — DEXAMETHASONE SODIUM PHOSPHATE 4 MG/ML
10 INJECTION, SOLUTION INTRA-ARTICULAR; INTRALESIONAL; INTRAMUSCULAR; INTRAVENOUS; SOFT TISSUE ONCE
Status: COMPLETED | OUTPATIENT
Start: 2022-07-30 | End: 2022-07-30

## 2022-07-30 RX ORDER — METHYLPREDNISOLONE 4 MG/1
TABLET ORAL
Qty: 21 TABLET | Refills: 0 | Status: SHIPPED | OUTPATIENT
Start: 2022-07-30

## 2022-07-30 RX ORDER — METHOCARBAMOL 500 MG/1
500 TABLET, FILM COATED ORAL ONCE
Status: COMPLETED | OUTPATIENT
Start: 2022-07-30 | End: 2022-07-30

## 2022-07-30 RX ORDER — KETOROLAC TROMETHAMINE 30 MG/ML
60 INJECTION, SOLUTION INTRAMUSCULAR; INTRAVENOUS ONCE
Status: COMPLETED | OUTPATIENT
Start: 2022-07-30 | End: 2022-07-30

## 2022-07-30 RX ORDER — LIDOCAINE 50 MG/G
1 PATCH TOPICAL ONCE
Status: DISCONTINUED | OUTPATIENT
Start: 2022-07-30 | End: 2022-07-30 | Stop reason: HOSPADM

## 2022-07-30 RX ADMIN — KETOROLAC TROMETHAMINE 60 MG: 30 INJECTION, SOLUTION INTRAMUSCULAR at 13:21

## 2022-07-30 RX ADMIN — LIDOCAINE 1 PATCH: 50 PATCH TOPICAL at 13:24

## 2022-07-30 RX ADMIN — METHOCARBAMOL 500 MG: 500 TABLET ORAL at 13:24

## 2022-07-30 RX ADMIN — DEXAMETHASONE SODIUM PHOSPHATE 10 MG: 4 INJECTION, SOLUTION INTRAMUSCULAR; INTRAVENOUS at 13:23

## 2022-07-30 NOTE — ED NOTES
Pt currently using ER restroom in hallway  Was wheeled to bathroom via wheelchair with significant other        Joe Umaña, RN  07/30/22 [id number]

## 2022-07-30 NOTE — ED PROVIDER NOTES
History  Chief Complaint   Patient presents with    Back Pain     Pt states she has had this for several years however in the past few days her pain has gotten significantly worse radiating from lower back into left thigh region and down to her foot and now the pain is present in the right buttock and hip region as of yesterday  Pt crying with pain on arrival  Using OTC remedies (nonnarcotic) and states they are not touching the pain     HPI  This is a 68-year-old female obese presented to ED with the complain of abdominal pain  Patient states she does have a history of abdominal pain and has been picking up 10-15 lb gravel  She states that she is unsure why hip pain started  Denies any trauma or injury to her back pain  She denies any urinary bowel incontinence  Denies numbness or tingling  States the pain radiates down from her left back to her left thigh  States this is similar to her sciatica pain but worse today  Rating her pain 10/10 at this time  She states nothing makes the pain better or worse and unable to find a comfortable position  She is otherwise stable awake alert x3 GCS 15  Able to provide history without any difficulty  Prior to Admission Medications   Prescriptions Last Dose Informant Patient Reported? Taking? Celecoxib (CELEBREX PO)   Yes No   Sig: Take 200 mg by mouth daily as needed    gabapentin (NEURONTIN) 300 mg capsule   Yes No   Sig: Take 300 mg by mouth 2 (two) times a day as needed       Facility-Administered Medications: None       Past Medical History:   Diagnosis Date    Anemia     Arthritis     Bronchitis     Chronic pain disorder     L ankle    History of transfusion     no adverse reactions- 04/01/2019 last one    Hypertension     being monitored by PCP     Pneumonia     jan/feb 2019    Wears glasses        Past Surgical History:   Procedure Laterality Date    ANKLE ARTHROSCOPY      had 4 ankle surgeries on L foot; 2010;  Oct 2017; Feb 2018    ANKLE LIGAMENT RECONSTRUCTION Right     BREAST BIOPSY Left 2016    clip    CHOLECYSTECTOMY      COLONOSCOPY      DILATION AND CURETTAGE OF UTERUS WITH HYSTEROSOCPY N/A 4/15/2019    Procedure: D&C, HYSTEROSCOPY;  Surgeon: Rocky Alcazar MD;  [facility];  Service: Gynecology    HEEL SPUR EXCISION Right 2007    [place] HYSTEROSCOPY,W/ENDOMETRIAL ABLATION N/A 4/15/2019    Procedure: Froilan Frey;  Surgeon: Rocky Alcazar MD;  [facility];  Service: Gynecology    TUBAL LIGATION         Family History   Problem Relation Age of Onset    Cancer Father     Breast cancer Paternal Grandmother 40    Breast cancer Paternal Aunt 48    Breast cancer Cousin 32     I have reviewed and agree with the history as documented  E-Cigarette/Vaping    E-Cigarette Use Never User      E-Cigarette/Vaping Substances     Social History     Tobacco Use    Smoking status: Current Every Day Smoker     Packs/day: 0 50     Years: 15 00     Pack years: 7 50     Types: Cigarettes    Smokeless tobacco: Never Used   Vaping Use    Vaping Use: Never used   Substance Use Topics    Alcohol use: Yes     Alcohol/week: 2 0 standard drinks     Types: 1 Glasses of wine, 1 Shots of liquor per week     Comment: weekends    Drug use: No       Review of Systems   Constitutional: Negative  Negative for chills and fever  HENT: Negative  Negative for rhinorrhea and sore throat  Eyes: Negative for visual disturbance  Respiratory: Negative for cough and shortness of breath  Cardiovascular: Negative for chest pain and leg swelling  Gastrointestinal: Negative for abdominal pain, diarrhea, nausea and vomiting  Genitourinary: Negative for dysuria  Musculoskeletal: Positive for back pain  Negative for myalgias  Skin: Negative for rash  Neurological: Negative for dizziness and headaches  Psychiatric/Behavioral: Negative for confusion  All other systems reviewed and are negative        Physical Exam  Physical Exam  Vitals and nursing note reviewed  Constitutional:       Appearance: She is well-developed  HENT:      Head: Normocephalic and atraumatic  Right Ear: External ear normal       Left Ear: External ear normal       Nose: Nose normal       Mouth/Throat:      Mouth: Mucous membranes are moist       Pharynx: No oropharyngeal exudate  Eyes:      General: No scleral icterus  Right eye: No discharge  Left eye: No discharge  Conjunctiva/sclera: Conjunctivae normal       Pupils: Pupils are equal, round, and reactive to light  Cardiovascular:      Rate and Rhythm: Normal rate and regular rhythm  Pulses: Normal pulses  Heart sounds: Normal heart sounds  Pulmonary:      Effort: Pulmonary effort is normal  No respiratory distress  Breath sounds: Normal breath sounds  No wheezing  Abdominal:      General: Bowel sounds are normal       Palpations: Abdomen is soft  Musculoskeletal:         General: Tenderness (lower back:  No step-off noted no edema swelling or bruising noted ) present  Normal range of motion  Cervical back: Normal range of motion and neck supple  Lymphadenopathy:      Cervical: No cervical adenopathy  Skin:     General: Skin is warm and dry  Capillary Refill: Capillary refill takes less than 2 seconds  Neurological:      General: No focal deficit present  Mental Status: She is alert and oriented to person, place, and time     Psychiatric:         Mood and Affect: Mood normal          Behavior: Behavior normal          Vital Signs  ED Triage Vitals [07/30/22 1309]   Temperature Pulse Respirations Blood Pressure SpO2   98 3 °F (36 8 °C) 88 20 131/100 98 %      Temp Source Heart Rate Source Patient Position - Orthostatic VS BP Location FiO2 (%)   Tympanic Monitor Lying Right arm --      Pain Score       10 - Worst Possible Pain           Vitals:    07/30/22 1309   BP: 131/100   Pulse: 88   Patient Position - Orthostatic VS: Lying Visual Acuity      ED Medications  Medications   lidocaine (LIDODERM) 5 % patch 1 patch (1 patch Topical Medication Applied 7/30/22 1324)   ketorolac (TORADOL) injection 60 mg (60 mg Intramuscular Given 7/30/22 1321)   dexamethasone (DECADRON) injection 10 mg (10 mg Intramuscular Given 7/30/22 1323)   methocarbamol (ROBAXIN) tablet 500 mg (500 mg Oral Given 7/30/22 1324)       Diagnostic Studies  Results Reviewed     None                 CT spine lumbar without contrast   Final Result by Alice Lopez MD (07/30 1416)      No acute fractures  Degenerative changes of the lumbar spine, as described above  Workstation performed: [id number]                    Procedures  Procedures         ED Course                               SBIRT 22yo+    Flowsheet Row Most Recent Value   SBIRT (25 yo +)    In order to provide better care to our patients, we are screening all of our patients for alcohol and drug use  Would it be okay to ask you these screening questions? Yes Filed at: 07/30/2022 1315   Initial Alcohol Screen: US AUDIT-C     1  How often do you have a drink containing alcohol? 2 Filed at: 07/30/2022 1315   2  How many drinks containing alcohol do you have on a typical day you are drinking? 1 Filed at: 07/30/2022 1315   3a  Male UNDER 65: How often do you have five or more drinks on one occasion? 0 Filed at: 07/30/2022 1315   3b  FEMALE Any Age, or MALE 65+: How often do you have 4 or more drinks on one occassion? 0 Filed at: 07/30/2022 1315   Audit-C Score 3 Filed at: 07/30/2022 1315   SITA: How many times in the past year have you    Used an illegal drug or used a prescription medication for non-medical reasons? Never Filed at: 07/30/2022 1315                    MDM  Number of Diagnoses or Management Options  Acute exacerbation of chronic low back pain  Radiculopathy  Diagnosis management comments: Radiculopathy:  Patient states she is feeling much better after treatment    Able to move around without difficulty patient is ambulatory in the ED  Denies any urinary bowel incontinence no red flags noted  Disposition  Final diagnoses:   Acute exacerbation of chronic low back pain   Radiculopathy     Time reflects when diagnosis was documented in both MDM as applicable and the Disposition within this note     Time User Action Codes Description Comment    7/30/2022  2:50 PM Laurel Lito Add [M54 50,  G89 29] Acute exacerbation of chronic low back pain     7/30/2022  2:50 PM Laurel [place] Add [G07 92] Radiculopathy       ED Disposition     ED Disposition   Discharge    Condition   Stable    Date/Time   Sat Jul 30, 2022  2:50 PM    Comment   Corrine Cristina discharge to home/self care                 Follow-up Information     Follow up With Specialties Details Why Ramya Vo MD Family Medicine Schedule an appointment as soon as possible for a visit in 2 days If symptoms worsen [address]            Patient's Medications   Discharge Prescriptions    METHOCARBAMOL (ROBAXIN) 500 MG TABLET    Take 1 tablet (500 mg total) by mouth 2 (two) times a day       Start Date: 7/30/2022 End Date: --       Order Dose: 500 mg       Quantity: 20 tablet    Refills: 0    METHYLPREDNISOLONE 4 MG TABLET THERAPY PACK    Use as directed on package       Start Date: 7/30/2022 End Date: --       Order Dose: --       Quantity: 21 tablet    Refills: 0           PDMP Review     None          ED Provider  Electronically Signed by           Stewart Vu MD  07/30/22 4916

## 2022-07-30 NOTE — ED NOTES
PT wheeled from waiting room to [facility] area  Had difficulty transitioning from wheelchair seated position to litter in hallway  Assisted with same  Pt crying with pain  Denies urinary or fecal incontinence with presence of pain in lower back  Pt able to move all extremities when transitioning to litter from wheelchair  Significant other present with patient        Ramiro Arnold RN  07/30/22 3462

## 2022-08-01 ENCOUNTER — TELEPHONE (OUTPATIENT)
Dept: PHYSICAL THERAPY | Facility: OTHER | Age: 47
End: 2022-08-01

## 2022-08-01 NOTE — TELEPHONE ENCOUNTER
Message left for patient regarding referral entered for  [facility]      Contact information, hours of operation and VM instructions left  Nurse requested patient to CB if needed and leave Full Name &  on VM       Referral deferred per protocol

## 2022-08-16 ENCOUNTER — TELEPHONE (OUTPATIENT)
Dept: PHYSICAL THERAPY | Facility: OTHER | Age: 47
End: 2022-08-16

## 2022-08-16 NOTE — TELEPHONE ENCOUNTER
Call placed to the patient per [facility] referral     Message states the person you  Are calling can not accept calls at this time  No option to LVM  This is the 3rd attempt to reach the patient  Referral closed

## 2022-12-17 ENCOUNTER — APPOINTMENT (EMERGENCY)
Dept: CT IMAGING | Facility: HOSPITAL | Age: 47
End: 2022-12-17

## 2022-12-17 ENCOUNTER — HOSPITAL ENCOUNTER (EMERGENCY)
Facility: HOSPITAL | Age: 47
Discharge: HOME | End: 2022-12-17
Attending: EMERGENCY MEDICINE

## 2022-12-17 VITALS
WEIGHT: 250 LBS | BODY MASS INDEX: 42.68 KG/M2 | DIASTOLIC BLOOD PRESSURE: 86 MMHG | TEMPERATURE: 98 F | RESPIRATION RATE: 18 BRPM | HEIGHT: 64 IN | HEART RATE: 84 BPM | SYSTOLIC BLOOD PRESSURE: 156 MMHG | OXYGEN SATURATION: 95 %

## 2022-12-17 DIAGNOSIS — R10.11 RUQ PAIN: Primary | ICD-10-CM

## 2022-12-17 DIAGNOSIS — R10.9 ACUTE RIGHT FLANK PAIN: ICD-10-CM

## 2022-12-17 LAB
ALBUMIN SERPL BCP-MCNC: 3.6 G/DL (ref 3.5–5)
ALP SERPL-CCNC: 70 U/L (ref 46–116)
ALT SERPL W P-5'-P-CCNC: 39 U/L (ref 12–78)
ANION GAP SERPL CALCULATED.3IONS-SCNC: 9 MMOL/L (ref 4–13)
AST SERPL W P-5'-P-CCNC: 17 U/L (ref 5–45)
BACTERIA UR QL AUTO: ABNORMAL /HPF
BASOPHILS # BLD MANUAL: 0 THOUSAND/UL (ref 0–0.1)
BASOPHILS NFR MAR MANUAL: 0 % (ref 0–1)
BILIRUB SERPL-MCNC: 0.2 MG/DL (ref 0.2–1)
BILIRUB UR QL STRIP: NEGATIVE
BUN SERPL-MCNC: 8 MG/DL (ref 5–25)
CALCIUM SERPL-MCNC: 9.1 MG/DL (ref 8.3–10.1)
CHLORIDE SERPL-SCNC: 101 MMOL/L (ref 96–108)
CLARITY UR: ABNORMAL
CO2 SERPL-SCNC: 27 MMOL/L (ref 21–32)
COLOR UR: YELLOW
CREAT SERPL-MCNC: 0.62 MG/DL (ref 0.6–1.3)
EOSINOPHIL # BLD MANUAL: 0.1 THOUSAND/UL (ref 0–0.4)
EOSINOPHIL NFR BLD MANUAL: 1 % (ref 0–6)
ERYTHROCYTE [DISTWIDTH] IN BLOOD BY AUTOMATED COUNT: 12.5 % (ref 11.6–15.1)
GFR SERPL CREATININE-BSD FRML MDRD: 106 ML/MIN/1.73SQ M
GLUCOSE SERPL-MCNC: 188 MG/DL (ref 65–140)
GLUCOSE UR STRIP-MCNC: ABNORMAL MG/DL
HCT VFR BLD AUTO: 40.3 % (ref 34.8–46.1)
HGB BLD-MCNC: 13.4 G/DL (ref 11.5–15.4)
HGB UR QL STRIP.AUTO: NEGATIVE
HYALINE CASTS #/AREA URNS LPF: ABNORMAL /LPF
KETONES UR STRIP-MCNC: ABNORMAL MG/DL
LEUKOCYTE ESTERASE UR QL STRIP: NEGATIVE
LIPASE SERPL-CCNC: 154 U/L (ref 73–393)
LYMPHOCYTES # BLD AUTO: 2.8 THOUSAND/UL (ref 0.6–4.47)
LYMPHOCYTES # BLD AUTO: 29 % (ref 14–44)
MCH RBC QN AUTO: 28.2 PG (ref 26.8–34.3)
MCHC RBC AUTO-ENTMCNC: 33.3 G/DL (ref 31.4–37.4)
MCV RBC AUTO: 85 FL (ref 82–98)
MONOCYTES # BLD AUTO: 0.58 THOUSAND/UL (ref 0–1.22)
MONOCYTES NFR BLD: 6 % (ref 4–12)
NEUTROPHILS # BLD MANUAL: 6.18 THOUSAND/UL (ref 1.85–7.62)
NEUTS SEG NFR BLD AUTO: 64 % (ref 43–75)
NITRITE UR QL STRIP: NEGATIVE
NON-SQ EPI CELLS URNS QL MICRO: ABNORMAL /HPF
PH UR STRIP.AUTO: 6.5 [PH]
PLATELET # BLD AUTO: 236 THOUSANDS/UL (ref 149–390)
PLATELET BLD QL SMEAR: ADEQUATE
PMV BLD AUTO: 10.1 FL (ref 8.9–12.7)
POTASSIUM SERPL-SCNC: 4.3 MMOL/L (ref 3.5–5.3)
PROT SERPL-MCNC: 7.6 G/DL (ref 6.4–8.4)
PROT UR STRIP-MCNC: ABNORMAL MG/DL
RBC # BLD AUTO: 4.75 MILLION/UL (ref 3.81–5.12)
RBC #/AREA URNS AUTO: ABNORMAL /HPF
RBC MORPH BLD: NORMAL
SODIUM SERPL-SCNC: 137 MMOL/L (ref 135–147)
SP GR UR STRIP.AUTO: 1.02 (ref 1–1.03)
UROBILINOGEN UR STRIP-ACNC: <2 MG/DL
WBC # BLD AUTO: 9.65 THOUSAND/UL (ref 4.31–10.16)
WBC #/AREA URNS AUTO: ABNORMAL /HPF

## 2022-12-17 RX ORDER — SODIUM CHLORIDE 9 MG/ML
125 INJECTION, SOLUTION INTRAVENOUS CONTINUOUS
Status: DISCONTINUED | OUTPATIENT
Start: 2022-12-17 | End: 2022-12-17 | Stop reason: HOSPADM

## 2022-12-17 RX ORDER — ONDANSETRON 2 MG/ML
4 INJECTION INTRAMUSCULAR; INTRAVENOUS ONCE
Status: COMPLETED | OUTPATIENT
Start: 2022-12-17 | End: 2022-12-17

## 2022-12-17 RX ORDER — KETOROLAC TROMETHAMINE 30 MG/ML
15 INJECTION, SOLUTION INTRAMUSCULAR; INTRAVENOUS ONCE
Status: COMPLETED | OUTPATIENT
Start: 2022-12-17 | End: 2022-12-17

## 2022-12-17 RX ADMIN — SODIUM CHLORIDE 125 ML/HR: 0.9 INJECTION, SOLUTION INTRAVENOUS at 20:06

## 2022-12-17 RX ADMIN — ONDANSETRON 4 MG: 2 INJECTION INTRAMUSCULAR; INTRAVENOUS at 20:07

## 2022-12-17 RX ADMIN — KETOROLAC TROMETHAMINE 15 MG: 30 INJECTION, SOLUTION INTRAMUSCULAR; INTRAVENOUS at 20:09

## 2022-12-18 NOTE — ED PROVIDER NOTES
History  Chief Complaint   Patient presents with   • Abdominal Pain     Pt to ED c/o upper R abd pain that radiates to her back for a few days  Pt states Monday night pain was coming and going, but now pain is constant  When pain is at its worse, she feels nauseous     Several days of episodes of pain in the right flank wrapping around to the right upper quadrant lasting hours at a time  Reports that she cannot get comfortable when she has this pain  Associate with nausea but no vomiting  Has had 3 weeks of slightly loose stools no blood no melena  No dysuria hematuria frequency  No history of kidney stones  Has had her gallbladder removed  Other medical history includes morbid obesity, degenerative joint disease, hypertension  Prior to Admission Medications   Prescriptions Last Dose Informant Patient Reported? Taking? Celecoxib (CELEBREX PO)   Yes No   Sig: Take 200 mg by mouth daily as needed    gabapentin (NEURONTIN) 300 mg capsule   Yes No   Sig: Take 300 mg by mouth 2 (two) times a day as needed    methocarbamol (ROBAXIN) 500 mg tablet   No No   Sig: Take 1 tablet (500 mg total) by mouth 2 (two) times a day   methylPREDNISolone 4 MG tablet therapy pack   No No   Sig: Use as directed on package      Facility-Administered Medications: None       Past Medical History:   Diagnosis Date   • Anemia    • Arthritis    • Bronchitis    • Chronic pain disorder     L ankle   • History of transfusion     no adverse reactions- 04/01/2019 last one   • Hypertension     being monitored by PCP    • Pneumonia     jan/feb 2019   • Wears glasses        Past Surgical History:   Procedure Laterality Date   • ANKLE ARTHROSCOPY      had 4 ankle surgeries on L foot; 2010;  Oct 2017; Feb 2018   • ANKLE LIGAMENT RECONSTRUCTION Right    • BREAST BIOPSY Left 2016    clip   • CHOLECYSTECTOMY     • COLONOSCOPY     • DILATION AND CURETTAGE OF UTERUS WITH HYSTEROSOCPY N/A 4/15/2019    Procedure: D&C, HYSTEROSCOPY;  Surgeon: Lai Metcalf MD;  [facility];  Service: Gynecology   • HEEL SPUR EXCISION Right 2007   • [place] HYSTEROSCOPY,W/ENDOMETRIAL ABLATION N/A 4/15/2019    Procedure: Jasson Mendez;  Surgeon: aLi Metcalf MD;  [facility];  Service: Gynecology   • TUBAL LIGATION         Family History   Problem Relation Age of Onset   • Cancer Father    • Breast cancer Paternal Grandmother 40   • Breast cancer Paternal Aunt 46   • Breast cancer Cousin 32     I have reviewed and agree with the history as documented  E-Cigarette/Vaping   • E-Cigarette Use Never User      E-Cigarette/Vaping Substances     Social History     Tobacco Use   • Smoking status: Every Day     Packs/day: 0 50     Years: 15 00     Pack years: 7 50     Types: Cigarettes   • Smokeless tobacco: Never   Vaping Use   • Vaping Use: Never used   Substance Use Topics   • Alcohol use: Yes     Alcohol/week: 2 0 standard drinks     Types: 1 Glasses of wine, 1 Shots of liquor per week     Comment: weekends   • Drug use: No       Review of Systems   Constitutional: Negative for fever  HENT: Negative for rhinorrhea  Eyes: Negative for visual disturbance  Respiratory: Negative for shortness of breath  Cardiovascular: Negative for chest pain  Gastrointestinal: Positive for abdominal pain and nausea  Negative for diarrhea and vomiting  Endocrine: Negative for polydipsia  Genitourinary: Positive for flank pain  Negative for dysuria, frequency and hematuria  Musculoskeletal: Negative for neck stiffness  Skin: Negative for rash  Allergic/Immunologic: Negative for immunocompromised state  Neurological: Negative for speech difficulty, weakness and numbness  Psychiatric/Behavioral: Negative for suicidal ideas  Physical Exam  Physical Exam  Constitutional:       General: She is not in acute distress  HENT:      Head: Normocephalic and atraumatic        Right Ear: External ear normal       Left Ear: External ear normal  Nose: Nose normal       Mouth/Throat:      Pharynx: Oropharynx is clear  Eyes:      Conjunctiva/sclera: Conjunctivae normal    Cardiovascular:      Rate and Rhythm: Normal rate and regular rhythm  Heart sounds: Normal heart sounds  No murmur heard  Pulmonary:      Effort: Pulmonary effort is normal       Breath sounds: Normal breath sounds  Abdominal:      General: Bowel sounds are normal       Palpations: Abdomen is soft  There is no mass  Tenderness: There is abdominal tenderness in the right upper quadrant  There is right CVA tenderness  There is no guarding or rebound  Musculoskeletal:         General: No swelling or tenderness  Cervical back: Normal range of motion and neck supple  Right lower leg: No edema  Left lower leg: No edema  Skin:     General: Skin is warm and dry  Capillary Refill: Capillary refill takes less than 2 seconds  Neurological:      General: No focal deficit present  Mental Status: She is alert and oriented to person, place, and time     Psychiatric:         Mood and Affect: Mood normal          Vital Signs  ED Triage Vitals [12/17/22 1928]   Temperature Pulse Respirations Blood Pressure SpO2   98 °F (36 7 °C) 100 18 (!) 178/112 98 %      Temp src Heart Rate Source Patient Position - Orthostatic VS BP Location FiO2 (%)   -- Monitor Lying Left arm --      Pain Score       8           Vitals:    12/17/22 1928 12/17/22 2128   BP: (!) 178/112 156/86   Pulse: 100 84   Patient Position - Orthostatic VS: Lying Sitting         Visual Acuity      ED Medications  Medications   sodium chloride 0 9 % infusion (0 mL/hr Intravenous Stopped 12/17/22 2128)   ondansetron (ZOFRAN) injection 4 mg (4 mg Intravenous Given 12/17/22 2007)   ketorolac (TORADOL) injection 15 mg (15 mg Intravenous Given 12/17/22 2009)       Diagnostic Studies  Results Reviewed     Procedure Component Value Units Date/Time    Urine Microscopic [[id number]]  (Abnormal) Collected: 12/17/22 2012    Lab Status: Final result Specimen: Urine, Clean Catch Updated: 12/17/22 2049     RBC, UA None Seen /hpf      WBC, UA None Seen /hpf      Epithelial Cells Moderate /hpf      Bacteria, UA Occasional /hpf      Hyaline Casts, UA 0-1 /lpf     UA w Reflex to Microscopic w Reflex to Culture [[id number]]  (Abnormal) Collected: 12/17/22 2012    Lab Status: Final result Specimen: Urine, Clean Catch Updated: 12/17/22 2023     Color, UA Yellow     Clarity, UA Slightly Cloudy     Specific [place], UA 1 020     pH, UA 6 5     Leukocytes, UA Negative     Nitrite, UA Negative     Protein, UA Trace mg/dl      Glucose, UA 70 (7/100%) mg/dl      Ketones, UA Trace mg/dl      Urobilinogen, UA <2 0 mg/dl      Bilirubin, UA Negative     Occult Blood, UA Negative    CBC and differential [[id number]]  (Normal) Collected: 12/17/22 1936    Lab Status: Final result Specimen: Blood from Arm, Right Updated: 12/17/22 2016     WBC 9 65 Thousand/uL      RBC 4 75 Million/uL      Hemoglobin 13 4 g/dL      Hematocrit 40 3 %      MCV 85 fL      MCH 28 2 pg      MCHC 33 3 g/dL      RDW 12 5 %      MPV 10 1 fL      Platelets 238 Thousands/uL     Narrative: This is an appended report  These results have been appended to a previously verified report      Manual Differential(PHLEBS Do Not Order) [[id number]] Collected: 12/17/22 1936    Lab Status: Final result Specimen: Blood from Arm, Right Updated: 12/17/22 2016     Segmented % 64 %      Lymphocytes % 29 %      Monocytes % 6 %      Eosinophils, % 1 %      Basophils % 0 %      Absolute Neutrophils 6 18 Thousand/uL      Lymphocytes Absolute 2 80 Thousand/uL      Monocytes Absolute 0 58 Thousand/uL      Eosinophils Absolute 0 10 Thousand/uL      Basophils Absolute 0 00 Thousand/uL      Total Counted --     RBC Morphology Normal     Platelet Estimate Adequate    Comprehensive metabolic panel [[id number]]  (Abnormal) Collected: 12/17/22 1936    Lab Status: Final result Specimen: Blood from Arm, Right Updated: 12/17/22 2000     Sodium 137 mmol/L      Potassium 4 3 mmol/L      Chloride 101 mmol/L      CO2 27 mmol/L      ANION GAP 9 mmol/L      BUN 8 mg/dL      Creatinine 0 62 mg/dL      Glucose 188 mg/dL      Calcium 9 1 mg/dL      AST 17 U/L      ALT 39 U/L      Alkaline Phosphatase 70 U/L      Total Protein 7 6 g/dL      Albumin 3 6 g/dL      Total Bilirubin 0 20 mg/dL      eGFR 106 ml/min/1 73sq m     Narrative:      [place] guidelines for Chronic Kidney Disease (CKD):   •  Stage 1 with normal or high GFR (GFR > 90 mL/min/1 73 square meters)  •  Stage 2 Mild CKD (GFR = 60-89 mL/min/1 73 square meters)  •  Stage 3A Moderate CKD (GFR = 45-59 mL/min/1 73 square meters)  •  Stage 3B Moderate CKD (GFR = 30-44 mL/min/1 73 square meters)  •  Stage 4 Severe CKD (GFR = 15-29 mL/min/1 73 square meters)  •  Stage 5 End Stage CKD (GFR <15 mL/min/1 73 square meters)  Note: GFR calculation is accurate only with a steady state creatinine    Lipase [[id number]]  (Normal) Collected: 12/17/22 1936    Lab Status: Final result Specimen: Blood from Arm, Right Updated: 12/17/22 2000     Lipase 154 u/L                  CT abdomen pelvis wo contrast   Final Result by Jose Cruz Keating MD (12/17 2047)      Enlarged fatty liver  Workstation performed: [id number]         US right upper quadrant    (Results Pending)              Procedures  Procedures         ED Course                                             MDM  Number of Diagnoses or Management Options  Acute right flank pain  RUQ pain  Diagnosis management comments: Episodic pain to right flank radiating to right upper quadrant  No emergent findings on work-up  I had requested right upper quadrant ultrasound however we are unable to do it emergently  Patient is status postcholecystectomy and I have low suspicion for any other acute biliary emergency    Discharged home with PMD follow-up to discuss further work-up and management  Disposition  Final diagnoses:   RUQ pain   Acute right flank pain     Time reflects when diagnosis was documented in both MDM as applicable and the Disposition within this note     Time User Action Codes Description Comment    12/17/2022  9:19 PM Walter Singhk Add [R10 11] RUQ pain     12/17/2022  9:19 PM Waltertonja Singhk Add [R10 9] Acute right flank pain       ED Disposition     ED Disposition   Discharge    Condition   Stable    Date/Time   Sat Dec 17, 2022  9:19 PM    Comment   Floy Ovens discharge to home/self care  Follow-up Information     Follow up With Specialties Details Why Contact Info    Jeanie Rhodes MD Family Medicine Schedule an appointment as soon as possible for a visit in 2 days  [facility] [address]  [phone]            Discharge Medication List as of 12/17/2022  9:19 PM      CONTINUE these medications which have NOT CHANGED    Details   Celecoxib (CELEBREX PO) Take 200 mg by mouth daily as needed , Historical Med      gabapentin (NEURONTIN) 300 mg capsule Take 300 mg by mouth 2 (two) times a day as needed , Historical Med      methocarbamol (ROBAXIN) 500 mg tablet Take 1 tablet (500 mg total) by mouth 2 (two) times a day, Starting Sat 7/30/2022, Normal      methylPREDNISolone 4 MG tablet therapy pack Use as directed on package, Normal             No discharge procedures on file      PDMP Review     None          ED Provider  Electronically Signed by           Anette Favre, MD  12/17/22 7196

## 2022-12-18 NOTE — DISCHARGE INSTRUCTIONS
If your symptoms persist ask your primary doctor to refer you for a right upper quadrant ultrasound DISPLAY PLAN FREE TEXT DISPLAY PLAN FREE TEXT DISPLAY PLAN FREE TEXT DISPLAY PLAN FREE TEXT DISPLAY PLAN FREE TEXT

## 2023-12-06 ENCOUNTER — HOSPITAL ENCOUNTER (EMERGENCY)
Facility: HOSPITAL | Age: 48
Discharge: HOME | End: 2023-12-06
Attending: EMERGENCY MEDICINE

## 2023-12-06 VITALS
HEART RATE: 82 BPM | WEIGHT: 265 LBS | SYSTOLIC BLOOD PRESSURE: 180 MMHG | BODY MASS INDEX: 45.24 KG/M2 | HEIGHT: 64 IN | DIASTOLIC BLOOD PRESSURE: 98 MMHG | OXYGEN SATURATION: 98 % | TEMPERATURE: 99 F | RESPIRATION RATE: 18 BRPM

## 2023-12-06 DIAGNOSIS — K08.89 PAIN, DENTAL: Primary | ICD-10-CM

## 2023-12-06 PROCEDURE — 99284 EMERGENCY DEPT VISIT MOD MDM: CPT | Performed by: EMERGENCY MEDICINE

## 2023-12-06 PROCEDURE — 99282 EMERGENCY DEPT VISIT SF MDM: CPT

## 2023-12-06 RX ORDER — OXYCODONE HYDROCHLORIDE AND ACETAMINOPHEN 5; 325 MG/1; MG/1
1 TABLET ORAL ONCE
Status: COMPLETED | OUTPATIENT
Start: 2023-12-06 | End: 2023-12-06

## 2023-12-06 RX ORDER — AMOXICILLIN AND CLAVULANATE POTASSIUM 875; 125 MG/1; MG/1
1 TABLET, FILM COATED ORAL EVERY 12 HOURS
Qty: 14 TABLET | Refills: 0 | Status: SHIPPED | OUTPATIENT
Start: 2023-12-06 | End: 2023-12-13

## 2023-12-06 RX ORDER — AMOXICILLIN AND CLAVULANATE POTASSIUM 875; 125 MG/1; MG/1
1 TABLET, FILM COATED ORAL ONCE
Status: COMPLETED | OUTPATIENT
Start: 2023-12-06 | End: 2023-12-06

## 2023-12-06 RX ADMIN — AMOXICILLIN AND CLAVULANATE POTASSIUM 1 TABLET: 875; 125 TABLET, FILM COATED ORAL at 20:42

## 2023-12-06 RX ADMIN — OXYCODONE HYDROCHLORIDE AND ACETAMINOPHEN 1 TABLET: 5; 325 TABLET ORAL at 20:42

## 2023-12-07 NOTE — ED PROVIDER NOTES
History  Chief Complaint   Patient presents with    Dental Pain     Pt reports left sided jaw pain. Pt states she has a cracked tooth on that side and another tooth that is split and needs to come out. Pt was to a dentist to get X-rays but the oral surgeon would not accept the X-rays. Pt has been dealing with this pain for about 4 weeks but has been getting worse over the last 4 days. Pt has been taking ibuprofen. No difficulty swallowing, no change in voice. Reports some intermittent left-sided facial swelling which is currently more mild. Reports pain is severe. Has been trying multiple over-the-counter medications for this with minimal improvement. Not on any antibiotics. Prior to Admission Medications   Prescriptions Last Dose Informant Patient Reported? Taking? Celecoxib (CELEBREX PO)   Yes No   Sig: Take 200 mg by mouth daily as needed    gabapentin (NEURONTIN) 300 mg capsule   Yes No   Sig: Take 300 mg by mouth 2 (two) times a day as needed    methocarbamol (ROBAXIN) 500 mg tablet   No No   Sig: Take 1 tablet (500 mg total) by mouth 2 (two) times a day   methylPREDNISolone 4 MG tablet therapy pack   No No   Sig: Use as directed on package      Facility-Administered Medications: None       Past Medical History:   Diagnosis Date    Anemia     Arthritis     Bronchitis     Chronic pain disorder     L ankle    History of transfusion     no adverse reactions- 04/01/2019 last one    Hypertension     being monitored by PCP     Pneumonia     jan/feb 2019    Wears glasses        Past Surgical History:   Procedure Laterality Date    ANKLE ARTHROSCOPY      had 4 ankle surgeries on L foot; 2010;  Oct 2017; Feb 2018    ANKLE LIGAMENT RECONSTRUCTION Right     BREAST BIOPSY Left 2016    clip    CHOLECYSTECTOMY      COLONOSCOPY      DILATION AND CURETTAGE OF UTERUS WITH HYSTEROSOCPY N/A 4/15/2019    Procedure: D&C, HYSTEROSCOPY;  Surgeon: Thalia Torres MD;  Location: [facility];  Service: Gynecology HEEL SPUR EXCISION Right 2007    RI HYSTEROSCOPY ENDOMETRIAL ABLATION N/A 4/15/2019    Procedure: Richie Jean;  Surgeon: Paulo Alfonso MD;  Location: [facility];  Service: Gynecology    TUBAL LIGATION         Family History   Problem Relation Age of Onset    Cancer Father     Breast cancer Paternal Grandmother 40    Breast cancer Paternal Aunt 48    Breast cancer Cousin 32     I have reviewed and agree with the history as documented. E-Cigarette/Vaping    E-Cigarette Use Never User      E-Cigarette/Vaping Substances     Social History     Tobacco Use    Smoking status: Every Day     Packs/day: 0.50     Years: 15.00     Total pack years: 7.50     Types: Cigarettes    Smokeless tobacco: Never   Vaping Use    Vaping Use: Never used   Substance Use Topics    Alcohol use: Yes     Alcohol/week: 2.0 standard drinks of alcohol     Types: 1 Glasses of wine, 1 Shots of liquor per week     Comment: weekends    Drug use: No       Review of Systems   Constitutional:  Negative for activity change, fatigue and fever. HENT:  Negative for congestion. Eyes:  Negative for visual disturbance. Respiratory:  Negative for cough, chest tightness and shortness of breath. Cardiovascular:  Negative for chest pain. Gastrointestinal:  Negative for abdominal pain, diarrhea and vomiting. Genitourinary:  Negative for dysuria. Skin:  Negative for rash. Neurological:  Negative for dizziness, weakness and numbness. Physical Exam  Physical Exam  Constitutional:       General: She is not in acute distress. Appearance: She is well-developed. She is not ill-appearing, toxic-appearing or diaphoretic. HENT:      Head: Normocephalic and atraumatic. Right Ear: External ear normal.      Left Ear: External ear normal.      Nose: Nose normal.      Mouth/Throat:      Mouth: Mucous membranes are moist.      Comments:  To fracture molars, bilateral on the mandible  Eyes:      Conjunctiva/sclera: Conjunctivae normal.      Pupils: Pupils are equal, round, and reactive to light. Cardiovascular:      Rate and Rhythm: Normal rate and regular rhythm. Heart sounds: Normal heart sounds. Pulmonary:      Effort: Pulmonary effort is normal. No respiratory distress. Breath sounds: Normal breath sounds. Abdominal:      General: Bowel sounds are normal.      Palpations: Abdomen is soft. Musculoskeletal:         General: Normal range of motion. Cervical back: Normal range of motion and neck supple. Skin:     General: Skin is warm and dry. Capillary Refill: Capillary refill takes less than 2 seconds. Neurological:      Mental Status: She is alert and oriented to person, place, and time. Psychiatric:         Behavior: Behavior normal.         Vital Signs  ED Triage Vitals   Temperature Pulse Respirations Blood Pressure SpO2   12/06/23 2015 12/06/23 2015 12/06/23 2015 12/06/23 2020 12/06/23 2015   99 °F (37.2 °C) 82 18 (!) 180/98 98 %      Temp Source Heart Rate Source Patient Position - Orthostatic VS BP Location FiO2 (%)   12/06/23 2015 12/06/23 2015 12/06/23 2015 12/06/23 2015 --   Temporal Monitor Sitting Left arm       Pain Score       12/06/23 2015       6           Vitals:    12/06/23 2015 12/06/23 2020   BP:  (!) 180/98   Pulse: 82    Patient Position - Orthostatic VS: Sitting Lying         Visual Acuity      ED Medications  Medications   amoxicillin-clavulanate (AUGMENTIN) 875-125 mg per tablet 1 tablet (1 tablet Oral Given 12/6/23 2042)   oxyCODONE-acetaminophen (PERCOCET) 5-325 mg per tablet 1 tablet (1 tablet Oral Given 12/6/23 2042)       Diagnostic Studies  Results Reviewed       None                   No orders to display              Procedures  Procedures         ED Course                               SBIRT 20yo+      Flowsheet Row Most Recent Value   Initial Alcohol Screen: US AUDIT-C     1. How often do you have a drink containing alcohol? 0 Filed at: 12/06/2023 2019   2.  How many drinks containing alcohol do you have on a typical day you are drinking? 0 Filed at: 12/06/2023 2019   3a. Male UNDER 65: How often do you have five or more drinks on one occasion? 0 Filed at: 12/06/2023 2019   3b. FEMALE Any Age, or MALE 65+: How often do you have 4 or more drinks on one occassion? 0 Filed at: 12/06/2023 2019   Audit-C Score 0 Filed at: 12/06/2023 2019   SITA: How many times in the past year have you. .. Used an illegal drug or used a prescription medication for non-medical reasons? Never Filed at: 12/06/2023 2019                      Medical Decision Making  No systemic fevers, chills, sweats. No focal neurological defects. No visual disturbance. No hearing loss/tinnitus/vertigo. No pain behind the ear. No parotid gland tenderness. No neck pain or trouble swallowing. No deviation of the tonsils to suggest peritonsillar abscess. No obvious drainable intraoral abscess. No facial rash or blisters. No woodiness or swelling underneath the tongue. No claudication when chewing. No headache. Discussed warning signs and symptoms with the patient as well as when to return to the emergency department versus follow up with PC [facility]. Patient states understanding and agreement with the plan. Patient care delayed due to critical capacity in the emergency department. This note was completed using dictation software. Problems Addressed:  Pain, dental: acute illness or injury    Risk  Prescription drug management. Disposition  Final diagnoses:   Pain, dental     Time reflects when diagnosis was documented in both MDM as applicable and the Disposition within this note       Time User Action Codes Description Comment    12/6/2023  8:35 PM Ayesha Cross Add [K08.89] Pain, dental           ED Disposition       ED Disposition   Discharge    Condition   Stable    Date/Time   Wed Dec 6, 2023 2035    Comment   Stevie Shepard discharge to home/self care.                    Follow-up Information       Follow up With Specialties Details Why [address] for Oral and [address] [place]  In 1 day  [facility] [address]            Discharge Medication List as of 12/6/2023  8:46 PM        START taking these medications    Details   amoxicillin-clavulanate (AUGMENTIN) 875-125 mg per tablet Take 1 tablet by mouth every 12 (twelve) hours for 7 days, Starting Wed 12/6/2023, Until Wed 12/13/2023, Normal           CONTINUE these medications which have NOT CHANGED    Details   Celecoxib (CELEBREX PO) Take 200 mg by mouth daily as needed , Historical Med      gabapentin (NEURONTIN) 300 mg capsule Take 300 mg by mouth 2 (two) times a day as needed , Historical Med      methocarbamol (ROBAXIN) 500 mg tablet Take 1 tablet (500 mg total) by mouth 2 (two) times a day, Starting Sat 7/30/2022, Normal      methylPREDNISolone 4 MG tablet therapy pack Use as directed on package, Normal                 PDMP Review       None            ED Provider  Electronically Signed by             Satnam Tee MD  12/06/23 8198

## 2023-12-09 ENCOUNTER — HOSPITAL ENCOUNTER (EMERGENCY)
Facility: HOSPITAL | Age: 48
Discharge: HOME | End: 2023-12-09
Attending: EMERGENCY MEDICINE

## 2023-12-09 VITALS
HEART RATE: 91 BPM | OXYGEN SATURATION: 95 % | TEMPERATURE: 98.3 F | BODY MASS INDEX: 45.24 KG/M2 | HEIGHT: 64 IN | WEIGHT: 265 LBS | SYSTOLIC BLOOD PRESSURE: 191 MMHG | DIASTOLIC BLOOD PRESSURE: 118 MMHG | RESPIRATION RATE: 16 BRPM

## 2023-12-09 DIAGNOSIS — K08.89 DENTALGIA: Primary | ICD-10-CM

## 2023-12-09 DIAGNOSIS — K03.81 CRACKED TOOTH: ICD-10-CM

## 2023-12-09 DIAGNOSIS — R03.0 ELEVATED BLOOD PRESSURE READING: ICD-10-CM

## 2023-12-09 PROCEDURE — 99284 EMERGENCY DEPT VISIT MOD MDM: CPT | Performed by: EMERGENCY MEDICINE

## 2023-12-09 PROCEDURE — 64450 NJX AA&/STRD OTHER PN/BRANCH: CPT | Performed by: EMERGENCY MEDICINE

## 2023-12-09 PROCEDURE — 99282 EMERGENCY DEPT VISIT SF MDM: CPT

## 2023-12-09 RX ORDER — LIDOCAINE HYDROCHLORIDE 20 MG/ML
15 SOLUTION OROPHARYNGEAL ONCE
Status: COMPLETED | OUTPATIENT
Start: 2023-12-09 | End: 2023-12-09

## 2023-12-09 RX ORDER — ACETAMINOPHEN 325 MG/1
650 TABLET ORAL ONCE
Status: COMPLETED | OUTPATIENT
Start: 2023-12-09 | End: 2023-12-09

## 2023-12-09 RX ORDER — OXYCODONE HYDROCHLORIDE 5 MG/1
5 TABLET ORAL ONCE
Status: COMPLETED | OUTPATIENT
Start: 2023-12-09 | End: 2023-12-09

## 2023-12-09 RX ORDER — NAPROXEN 500 MG/1
500 TABLET ORAL ONCE
Status: COMPLETED | OUTPATIENT
Start: 2023-12-09 | End: 2023-12-09

## 2023-12-09 RX ORDER — ROPIVACAINE HYDROCHLORIDE 5 MG/ML
15 INJECTION, SOLUTION EPIDURAL; INFILTRATION; PERINEURAL ONCE
Status: COMPLETED | OUTPATIENT
Start: 2023-12-09 | End: 2023-12-09

## 2023-12-09 RX ADMIN — OXYCODONE HYDROCHLORIDE 5 MG: 5 TABLET ORAL at 02:13

## 2023-12-09 RX ADMIN — NAPROXEN 500 MG: 500 TABLET ORAL at 01:25

## 2023-12-09 RX ADMIN — ROPIVACAINE HYDROCHLORIDE 15 ML: 5 INJECTION, SOLUTION EPIDURAL; INFILTRATION; PERINEURAL at 01:48

## 2023-12-09 RX ADMIN — LIDOCAINE HYDROCHLORIDE 15 ML: 20 SOLUTION ORAL; TOPICAL at 01:25

## 2023-12-09 RX ADMIN — ACETAMINOPHEN 650 MG: 325 TABLET ORAL at 01:25

## 2023-12-09 NOTE — ED PROVIDER NOTES
History  Chief Complaint   Patient presents with    Dental Pain     Was seen here Wednesday for same. Bilat lower jaw pain > on left. Taking antibiotics. Pain became worse tonight     57-year-old female presents back to the emergency department for evaluation of ongoing dental pain. Patient was seen here on 12/6/2023 for the same complaint. Was treated with a Percocet and Augmentin and was discharged home with a prescription for Augmentin. Patient states that the pain acutely worsened tonight. No associated fevers or chills. No difficulty swallowing or voice change. No neck pain or stiffness. Nausea or vomiting. Prior to Admission Medications   Prescriptions Last Dose Informant Patient Reported? Taking? Celecoxib (CELEBREX PO)   Yes No   Sig: Take 200 mg by mouth daily as needed    amoxicillin-clavulanate (AUGMENTIN) 875-125 mg per tablet   No No   Sig: Take 1 tablet by mouth every 12 (twelve) hours for 7 days   gabapentin (NEURONTIN) 300 mg capsule   Yes No   Sig: Take 300 mg by mouth 2 (two) times a day as needed    methocarbamol (ROBAXIN) 500 mg tablet   No No   Sig: Take 1 tablet (500 mg total) by mouth 2 (two) times a day   methylPREDNISolone 4 MG tablet therapy pack   No No   Sig: Use as directed on package      Facility-Administered Medications: None       Past Medical History:   Diagnosis Date    Anemia     Arthritis     Bronchitis     Chronic pain disorder     L ankle    History of transfusion     no adverse reactions- 04/01/2019 last one    Hypertension     being monitored by PCP     Pneumonia     jan/feb 2019    Wears glasses        Past Surgical History:   Procedure Laterality Date    ANKLE ARTHROSCOPY      had 4 ankle surgeries on L foot; 2010;  Oct 2017; Feb 2018    ANKLE LIGAMENT RECONSTRUCTION Right     BREAST BIOPSY Left 2016    clip    CHOLECYSTECTOMY      COLONOSCOPY      DILATION AND CURETTAGE OF UTERUS WITH HYSTEROSOCPY N/A 4/15/2019    Procedure: D&C, HYSTEROSCOPY;  Surgeon: Faisal Silvestre MD;  [facility];  Service: Gynecology    HEEL SPUR EXCISION Right 2007    AR HYSTEROSCOPY ENDOMETRIAL ABLATION N/A 4/15/2019    Procedure: Kenton Goldman;  Surgeon: Faisal Silvestre MD;  [facility];  Service: Gynecology    TUBAL LIGATION         Family History   Problem Relation Age of Onset    Cancer Father     Breast cancer Paternal Grandmother 40    Breast cancer Paternal Aunt 48    Breast cancer Cousin 32     I have reviewed and agree with the history as documented. E-Cigarette/Vaping    E-Cigarette Use Never User      E-Cigarette/Vaping Substances     Social History     Tobacco Use    Smoking status: Every Day     Packs/day: 0.50     Years: 15.00     Total pack years: 7.50     Types: Cigarettes    Smokeless tobacco: Never   Vaping Use    Vaping Use: Never used   Substance Use Topics    Alcohol use: Yes     Alcohol/week: 2.0 standard drinks of alcohol     Types: 1 Glasses of wine, 1 Shots of liquor per week     Comment: weekends    Drug use: No       Review of Systems   Constitutional:  Negative for chills and fever. HENT:  Positive for dental problem. Negative for ear pain and sore throat. Eyes:  Negative for pain and visual disturbance. Respiratory:  Negative for cough and shortness of breath. Cardiovascular:  Negative for chest pain and palpitations. Gastrointestinal:  Negative for abdominal pain and vomiting. Genitourinary:  Negative for dysuria and hematuria. Musculoskeletal:  Negative for arthralgias and back pain. Skin:  Negative for color change and rash. Neurological:  Negative for seizures and syncope. All other systems reviewed and are negative. Physical Exam  Physical Exam  Vitals and nursing note reviewed. Constitutional:       General: She is not in acute distress. Appearance: She is obese. HENT:      Head: Normocephalic and atraumatic. Jaw: There is normal jaw occlusion. No trismus or swelling. Right Ear: External ear normal.      Left Ear: External ear normal.      Nose: Nose normal.      Mouth/Throat:      Lips: Pink. No lesions. Mouth: Mucous membranes are moist.      Dentition: Abnormal dentition. No dental abscesses. Tongue: No lesions. Tongue does not deviate from midline. Pharynx: Oropharynx is clear. Uvula midline. No oropharyngeal exudate or posterior oropharyngeal erythema. Eyes:      Extraocular Movements: Extraocular movements intact. Conjunctiva/sclera: Conjunctivae normal.      Pupils: Pupils are equal, round, and reactive to light. Cardiovascular:      Rate and Rhythm: Normal rate and regular rhythm. Pulmonary:      Effort: Pulmonary effort is normal. No respiratory distress. Breath sounds: No stridor. Musculoskeletal:         General: No deformity. Normal range of motion. Cervical back: Normal range of motion. Skin:     General: Skin is warm and dry. Capillary Refill: Capillary refill takes less than 2 seconds. Neurological:      General: No focal deficit present. Mental Status: She is alert and oriented to person, place, and time.    Psychiatric:         Mood and Affect: Mood normal.         Behavior: Behavior normal.         Vital Signs  ED Triage Vitals   Temperature Pulse Respirations Blood Pressure SpO2   12/09/23 0103 12/09/23 0101 12/09/23 0101 12/09/23 0102 12/09/23 0101   98.3 °F (36.8 °C) 91 16 (!) 191/118 95 %      Temp Source Heart Rate Source Patient Position - Orthostatic VS BP Location FiO2 (%)   12/09/23 0103 12/09/23 0101 -- -- --   Tympanic Monitor         Pain Score       12/09/23 0101       8           Vitals:    12/09/23 0101 12/09/23 0102   BP:  (!) 191/118   Pulse: 91          Visual Acuity      ED Medications  Medications   ropivacaine (NAROPIN) 0.5 % injection 15 mL (has no administration in time range)   acetaminophen (TYLENOL) tablet 650 mg (650 mg Oral Given 12/9/23 0125)   naproxen (NAPROSYN) tablet 500 mg (500 mg Oral Given 12/9/23 0125)   Lidocaine Viscous HCl (XYLOCAINE) 2 % mucosal solution 15 mL (15 mL Swish & Spit Given 12/9/23 0125)       Diagnostic Studies  Results Reviewed       None                   No orders to display              Procedures  Nerve block    Date/Time: 12/9/2023 1:46 AM    Performed by: Hood Dykes MD  Authorized by: Hood Dykes MD  Universal Protocol:  Consent: Verbal consent obtained. Risks and benefits: risks, benefits and alternatives were discussed  Consent given by: patient  Time out: Immediately prior to procedure a "time out" was called to verify the correct patient, procedure, equipment, support staff and site/side marked as required. Timeout called at: 12/9/2023 1:46 AM.  Patient understanding: patient states understanding of the procedure being performed  Patient identity confirmed: verbally with patient    Indications:     Indications:  Pain relief  Location:     Body area:  Head    Head nerve blocked: Inferior alveolar. Laterality:  Left  Skin anesthesia (see MAR for exact dosages):     Skin anesthesia method:  None  Procedure details (see MAR for exact dosages): Block needle gauge:  25 G    Anesthetic injected:  Ropivacaine 0.5%    Steroid injected:  None    Additive injected:  None    Injection procedure:  Anatomic landmarks identified, incremental injection, negative aspiration for blood, anatomic landmarks palpated and introduced needle  Post-procedure details:     Dressing:  None    Outcome:  Pain improved    Patient tolerance of procedure: Tolerated well, no immediate complications           ED Course  ED Course as of 12/09/23 0146   Sat Dec 09, 2023   0144 Patient now interested in dental block, will proceed with inferior alveolar block                               SBIRT 20yo+      Flowsheet Row Most Recent Value   Initial Alcohol Screen: US AUDIT-C     1. How often do you have a drink containing alcohol? 0 Filed at: 12/09/2023 0142   2.  How many drinks containing alcohol do you have on a typical day you are drinking? 0 Filed at: 12/09/2023 0142   3a. Male UNDER 65: How often do you have five or more drinks on one occasion? 0 Filed at: 12/09/2023 0142   3b. FEMALE Any Age, or MALE 65+: How often do you have 4 or more drinks on one occassion? 0 Filed at: 12/09/2023 0142   Audit-C Score 0 Filed at: 12/09/2023 2148   SIAT: How many times in the past year have you. .. Used an illegal drug or used a prescription medication for non-medical reasons? Never Filed at: 12/09/2023 0142                      Medical Decision Making  61-year-old female presents back to the emergency department for evaluation of ongoing dentalgia. On exam, she is uncomfortable appearing, but in no acute distress. Tooth 17 is cracked which is likely contributing to her symptoms. Uvula is midline, submental space is soft, neck is supple without meningismus signs, no tongue elevation. There is no obvious dental abscess present. No signs of deeper space infection. Patient is already taking antibiotics. Will treat symptomatically with naproxen, Tylenol, and viscous lidocaine. Plan to discharge home with dental follow-up. Do not believe any further workup is necessary at this time. Problems Addressed:  Cracked tooth: acute illness or injury  Dentalgia: acute illness or injury    Amount and/or Complexity of Data Reviewed  External Data Reviewed: notes. Risk  OTC drugs. Prescription drug management.              Disposition  Final diagnoses:   Dentalgia   Cracked tooth   Elevated blood pressure reading     Time reflects when diagnosis was documented in both MDM as applicable and the Disposition within this note       Time User Action Codes Description Comment    12/9/2023  1:01 AM Check, Portia Marquez Add [K08.89] Dentalgia     12/9/2023  1:10 AM CheckPortia Add [K03.81] Cracked tooth     12/9/2023  1:13 AM Check, Portia Marquez Add [R03.0] Elevated blood pressure reading           ED Disposition       ED Disposition   Discharge    Condition   Stable    Date/Time   Sat Dec 9, 2023 0110    Comment   Karsten Addison discharge to home/self care. Follow-up Information       Follow up With Specialties Details Why Contact Info Additional [address] [facility] Emergency Department Emergency Medicine  If symptoms worsen [address] [place] [address]  [phone] [address] [place] [address] Emergency Department, [address] [place] [address]            Patient's Medications   Discharge Prescriptions    No medications on file       No discharge procedures on file.     PDMP Review       None            ED Provider  Electronically Signed by             Luke Singh MD  12/09/23 9930       Gela Cortés MD  12/09/23 5203

## 2023-12-09 NOTE — DISCHARGE INSTRUCTIONS
Recommend Tylenol and ibuprofen as needed for pain  Continue to take antibiotic as previously prescribed  Follow-up with dentistry    Return for any worsening symptoms including to develop fevers, difficulty swallowing or voice change.      Follow-up with primary care physician in regards to elevated blood pressure reading

## 2024-01-01 ENCOUNTER — APPOINTMENT (EMERGENCY)
Dept: CT IMAGING | Facility: HOSPITAL | Age: 49
End: 2024-01-01
Payer: COMMERCIAL

## 2024-01-01 ENCOUNTER — HOSPITAL ENCOUNTER (EMERGENCY)
Facility: HOSPITAL | Age: 49
Discharge: HOME | End: 2024-01-02
Attending: EMERGENCY MEDICINE
Payer: COMMERCIAL

## 2024-01-01 VITALS
SYSTOLIC BLOOD PRESSURE: 167 MMHG | OXYGEN SATURATION: 96 % | HEART RATE: 96 BPM | DIASTOLIC BLOOD PRESSURE: 98 MMHG | RESPIRATION RATE: 18 BRPM | TEMPERATURE: 98.5 F

## 2024-01-01 DIAGNOSIS — J03.90 TONSILLITIS: ICD-10-CM

## 2024-01-01 DIAGNOSIS — J02.9 SORE THROAT: ICD-10-CM

## 2024-01-01 DIAGNOSIS — J02.0 STREP PHARYNGITIS: ICD-10-CM

## 2024-01-01 DIAGNOSIS — J02.9 PHARYNGITIS: Primary | ICD-10-CM

## 2024-01-01 DIAGNOSIS — Z98.890 HISTORY OF RECENT DENTAL PROCEDURE: ICD-10-CM

## 2024-01-01 LAB
ANION GAP SERPL CALCULATED.3IONS-SCNC: 11 MMOL/L
APTT PPP: 29 SECONDS (ref 23–37)
BACTERIA UR QL AUTO: ABNORMAL /HPF
BASOPHILS # BLD AUTO: 0.03 THOUSANDS/ÂΜL (ref 0–0.1)
BASOPHILS NFR BLD AUTO: 0 % (ref 0–1)
BILIRUB UR QL STRIP: NEGATIVE
BUN SERPL-MCNC: 12 MG/DL (ref 5–25)
CALCIUM SERPL-MCNC: 10.2 MG/DL (ref 8.4–10.2)
CHLORIDE SERPL-SCNC: 99 MMOL/L (ref 96–108)
CLARITY UR: ABNORMAL
CO2 SERPL-SCNC: 23 MMOL/L (ref 21–32)
COLOR UR: YELLOW
CREAT SERPL-MCNC: 0.56 MG/DL (ref 0.6–1.3)
EOSINOPHIL # BLD AUTO: 0.09 THOUSAND/ÂΜL (ref 0–0.61)
EOSINOPHIL NFR BLD AUTO: 1 % (ref 0–6)
ERYTHROCYTE [DISTWIDTH] IN BLOOD BY AUTOMATED COUNT: 12.8 % (ref 11.6–15.1)
EXT PREGNANCY TEST URINE: NEGATIVE
EXT. CONTROL: NORMAL
FLUAV RNA RESP QL NAA+PROBE: NEGATIVE
FLUBV RNA RESP QL NAA+PROBE: NEGATIVE
GFR SERPL CREATININE-BSD FRML MDRD: 109 ML/MIN/1.73SQ M
GLUCOSE SERPL-MCNC: 174 MG/DL (ref 65–140)
GLUCOSE UR STRIP-MCNC: NEGATIVE MG/DL
HCT VFR BLD AUTO: 43 % (ref 34.8–46.1)
HGB BLD-MCNC: 14.6 G/DL (ref 11.5–15.4)
HGB UR QL STRIP.AUTO: NEGATIVE
IMM GRANULOCYTES # BLD AUTO: 0.08 THOUSAND/UL (ref 0–0.2)
IMM GRANULOCYTES NFR BLD AUTO: 1 % (ref 0–2)
INR PPP: 0.99 (ref 0.84–1.19)
KETONES UR STRIP-MCNC: ABNORMAL MG/DL
LACTATE SERPL-SCNC: 1.6 MMOL/L (ref 0.5–2)
LEUKOCYTE ESTERASE UR QL STRIP: ABNORMAL
LYMPHOCYTES # BLD AUTO: 2.13 THOUSANDS/ÂΜL (ref 0.6–4.47)
LYMPHOCYTES NFR BLD AUTO: 13 % (ref 14–44)
MCH RBC QN AUTO: 28.7 PG (ref 26.8–34.3)
MCHC RBC AUTO-ENTMCNC: 34 G/DL (ref 31.4–37.4)
MCV RBC AUTO: 85 FL (ref 82–98)
MONOCYTES # BLD AUTO: 0.84 THOUSAND/ÂΜL (ref 0.17–1.22)
MONOCYTES NFR BLD AUTO: 5 % (ref 4–12)
NEUTROPHILS # BLD AUTO: 12.76 THOUSANDS/ÂΜL (ref 1.85–7.62)
NEUTS SEG NFR BLD AUTO: 80 % (ref 43–75)
NITRITE UR QL STRIP: NEGATIVE
NON-SQ EPI CELLS URNS QL MICRO: ABNORMAL /HPF
NRBC BLD AUTO-RTO: 0 /100 WBCS
PH UR STRIP.AUTO: 5.5 [PH]
PLATELET # BLD AUTO: 229 THOUSANDS/UL (ref 149–390)
PMV BLD AUTO: 9.8 FL (ref 8.9–12.7)
POTASSIUM SERPL-SCNC: 3.8 MMOL/L (ref 3.5–5.3)
PROCALCITONIN SERPL-MCNC: 0.09 NG/ML
PROT UR STRIP-MCNC: NEGATIVE MG/DL
PROTHROMBIN TIME: 13.2 SECONDS (ref 11.6–14.5)
RBC # BLD AUTO: 5.08 MILLION/UL (ref 3.81–5.12)
RBC #/AREA URNS AUTO: ABNORMAL /HPF
RSV RNA RESP QL NAA+PROBE: NEGATIVE
S PYO DNA THROAT QL NAA+PROBE: DETECTED
SARS-COV-2 RNA RESP QL NAA+PROBE: NEGATIVE
SODIUM SERPL-SCNC: 133 MMOL/L (ref 135–147)
SP GR UR STRIP.AUTO: >=1.03
UROBILINOGEN UR QL STRIP.AUTO: 0.2 E.U./DL
WBC # BLD AUTO: 15.93 THOUSAND/UL (ref 4.31–10.16)
WBC #/AREA URNS AUTO: ABNORMAL /HPF

## 2024-01-01 PROCEDURE — 96361 HYDRATE IV INFUSION ADD-ON: CPT

## 2024-01-01 PROCEDURE — 87040 BLOOD CULTURE FOR BACTERIA: CPT | Performed by: EMERGENCY MEDICINE

## 2024-01-01 PROCEDURE — 85610 PROTHROMBIN TIME: CPT | Performed by: EMERGENCY MEDICINE

## 2024-01-01 PROCEDURE — 96375 TX/PRO/DX INJ NEW DRUG ADDON: CPT

## 2024-01-01 PROCEDURE — 81001 URINALYSIS AUTO W/SCOPE: CPT | Performed by: EMERGENCY MEDICINE

## 2024-01-01 PROCEDURE — 70491 CT SOFT TISSUE NECK W/DYE: CPT

## 2024-01-01 PROCEDURE — 81003 URINALYSIS AUTO W/O SCOPE: CPT | Performed by: EMERGENCY MEDICINE

## 2024-01-01 PROCEDURE — 96365 THER/PROPH/DIAG IV INF INIT: CPT

## 2024-01-01 PROCEDURE — 80048 BASIC METABOLIC PNL TOTAL CA: CPT | Performed by: EMERGENCY MEDICINE

## 2024-01-01 PROCEDURE — G1004 CDSM NDSC: HCPCS

## 2024-01-01 PROCEDURE — 81025 URINE PREGNANCY TEST: CPT | Performed by: EMERGENCY MEDICINE

## 2024-01-01 PROCEDURE — 85025 COMPLETE CBC W/AUTO DIFF WBC: CPT | Performed by: EMERGENCY MEDICINE

## 2024-01-01 PROCEDURE — 99285 EMERGENCY DEPT VISIT HI MDM: CPT | Performed by: EMERGENCY MEDICINE

## 2024-01-01 PROCEDURE — 36415 COLL VENOUS BLD VENIPUNCTURE: CPT | Performed by: EMERGENCY MEDICINE

## 2024-01-01 PROCEDURE — 83605 ASSAY OF LACTIC ACID: CPT | Performed by: EMERGENCY MEDICINE

## 2024-01-01 PROCEDURE — 87651 STREP A DNA AMP PROBE: CPT | Performed by: EMERGENCY MEDICINE

## 2024-01-01 PROCEDURE — 0241U HB NFCT DS VIR RESP RNA 4 TRGT: CPT | Performed by: EMERGENCY MEDICINE

## 2024-01-01 PROCEDURE — 84145 PROCALCITONIN (PCT): CPT | Performed by: EMERGENCY MEDICINE

## 2024-01-01 PROCEDURE — 85730 THROMBOPLASTIN TIME PARTIAL: CPT | Performed by: EMERGENCY MEDICINE

## 2024-01-01 PROCEDURE — 99283 EMERGENCY DEPT VISIT LOW MDM: CPT

## 2024-01-01 RX ORDER — ACETAMINOPHEN 325 MG/1
650 TABLET ORAL ONCE
Status: COMPLETED | OUTPATIENT
Start: 2024-01-02 | End: 2024-01-02

## 2024-01-01 RX ORDER — DEXAMETHASONE SODIUM PHOSPHATE 10 MG/ML
10 INJECTION, SOLUTION INTRAMUSCULAR; INTRAVENOUS ONCE
Status: COMPLETED | OUTPATIENT
Start: 2024-01-02 | End: 2024-01-02

## 2024-01-01 RX ORDER — HYDROMORPHONE HCL/PF 1 MG/ML
1 SYRINGE (ML) INJECTION ONCE
Status: COMPLETED | OUTPATIENT
Start: 2024-01-01 | End: 2024-01-01

## 2024-01-01 RX ORDER — KETOROLAC TROMETHAMINE 30 MG/ML
15 INJECTION, SOLUTION INTRAMUSCULAR; INTRAVENOUS ONCE
Status: COMPLETED | OUTPATIENT
Start: 2024-01-01 | End: 2024-01-01

## 2024-01-01 RX ADMIN — AMPICILLIN SODIUM AND SULBACTAM SODIUM 3 G: 2; 1 INJECTION, POWDER, FOR SOLUTION INTRAMUSCULAR; INTRAVENOUS at 23:36

## 2024-01-01 RX ADMIN — KETOROLAC TROMETHAMINE 15 MG: 30 INJECTION, SOLUTION INTRAMUSCULAR; INTRAVENOUS at 23:07

## 2024-01-01 RX ADMIN — HYDROMORPHONE HYDROCHLORIDE 1 MG: 1 INJECTION, SOLUTION INTRAMUSCULAR; INTRAVENOUS; SUBCUTANEOUS at 23:07

## 2024-01-01 RX ADMIN — IOHEXOL 85 ML: 350 INJECTION, SOLUTION INTRAVENOUS at 23:31

## 2024-01-01 RX ADMIN — SODIUM CHLORIDE 1000 ML: 0.9 INJECTION, SOLUTION INTRAVENOUS at 23:04

## 2024-01-02 PROCEDURE — 96375 TX/PRO/DX INJ NEW DRUG ADDON: CPT

## 2024-01-02 RX ORDER — AMOXICILLIN AND CLAVULANATE POTASSIUM 875; 125 MG/1; MG/1
1 TABLET, FILM COATED ORAL EVERY 12 HOURS
Qty: 20 TABLET | Refills: 0 | Status: SHIPPED | OUTPATIENT
Start: 2024-01-02 | End: 2024-01-12

## 2024-01-02 RX ADMIN — ACETAMINOPHEN 650 MG: 325 TABLET ORAL at 00:31

## 2024-01-02 RX ADMIN — DEXAMETHASONE SODIUM PHOSPHATE 10 MG: 10 INJECTION, SOLUTION INTRAMUSCULAR; INTRAVENOUS at 00:31

## 2024-01-02 NOTE — DISCHARGE INSTRUCTIONS
Return if symptoms worsen or if new symptoms develop or if you have any additional concerns.    Ibuprofen 400 mg and Tylenol 650 mg 6 hours as needed for pain.

## 2024-01-02 NOTE — ED PROVIDER NOTES
History  Chief Complaint   Patient presents with   • Dental Pain     Pt notes bilateral dental pain with pain radiating to the ears and neck. Pt also notes nausea, sore throat, and nasal congestion      48F with sore throat x1 day with worsening neck pain worse with flexion/extension. Had recent dental work done. Painful swallowing. Subjective fevers      Dental Pain      Prior to Admission Medications   Prescriptions Last Dose Informant Patient Reported? Taking?   Celecoxib (CELEBREX PO)   Yes No   Sig: Take 200 mg by mouth daily as needed    gabapentin (NEURONTIN) 300 mg capsule   Yes No   Sig: Take 300 mg by mouth 2 (two) times a day as needed    methocarbamol (ROBAXIN) 500 mg tablet   No No   Sig: Take 1 tablet (500 mg total) by mouth 2 (two) times a day   methylPREDNISolone 4 MG tablet therapy pack   No No   Sig: Use as directed on package      Facility-Administered Medications: None       Past Medical History:   Diagnosis Date   • Anemia    • Arthritis    • Bronchitis    • Chronic pain disorder     L ankle   • History of transfusion     no adverse reactions- 04/01/2019 last one   • Hypertension     being monitored by PCP    • Pneumonia     jan/feb 2019   • Wears glasses        Past Surgical History:   Procedure Laterality Date   • ANKLE ARTHROSCOPY      had 4 ankle surgeries on L foot; 2010; Oct 2017; Feb 2018   • ANKLE LIGAMENT RECONSTRUCTION Right    • BREAST BIOPSY Left 2016    clip   • CHOLECYSTECTOMY     • COLONOSCOPY     • DILATION AND CURETTAGE OF UTERUS WITH HYSTEROSOCPY N/A 4/15/2019    Procedure: D&C, HYSTEROSCOPY;  Surgeon: Luis Antonio Vo MD;  Location: [facility];  Service: Gynecology   • HEEL SPUR EXCISION Right 2007   • IA HYSTEROSCOPY ENDOMETRIAL ABLATION N/A 4/15/2019    Procedure: NOVASURE ABLATION;  Surgeon: Luis Antonio Vo MD;  Location: [facility];  Service: Gynecology   • TUBAL LIGATION         Family History   Problem Relation Age of Onset   • Cancer Father    • Breast  cancer Paternal Grandmother 44   • Breast cancer Paternal Aunt 50   • Breast cancer Cousin 26     I have reviewed and agree with the history as documented.    E-Cigarette/Vaping   • E-Cigarette Use Never User      E-Cigarette/Vaping Substances     Social History     Tobacco Use   • Smoking status: Every Day     Current packs/day: 0.50     Average packs/day: 0.5 packs/day for 15.0 years (7.5 ttl pk-yrs)     Types: Cigarettes   • Smokeless tobacco: Never   Vaping Use   • Vaping status: Never Used   Substance Use Topics   • Alcohol use: Yes     Alcohol/week: 2.0 standard drinks of alcohol     Types: 1 Glasses of wine, 1 Shots of liquor per week     Comment: weekends   • Drug use: No       Review of Systems    Physical Exam  Physical Exam  Vitals and nursing note reviewed.   Constitutional:       Appearance: Normal appearance. She is well-developed.   HENT:      Head: Normocephalic and atraumatic.   Eyes:      Conjunctiva/sclera: Conjunctivae normal.      Pupils: Pupils are equal, round, and reactive to light.   Neck:      Trachea: No tracheal deviation.   Cardiovascular:      Rate and Rhythm: Normal rate and regular rhythm.      Heart sounds: Normal heart sounds. No murmur heard.  Pulmonary:      Effort: Pulmonary effort is normal. No respiratory distress.      Breath sounds: Normal breath sounds. No wheezing or rales.   Abdominal:      General: Bowel sounds are normal. There is no distension.      Palpations: Abdomen is soft.      Tenderness: There is no abdominal tenderness.   Musculoskeletal:         General: No deformity.      Cervical back: Normal range of motion and neck supple.   Skin:     General: Skin is warm and dry.      Capillary Refill: Capillary refill takes less than 2 seconds.   Neurological:      General: No focal deficit present.      Mental Status: She is alert and oriented to person, place, and time.      Sensory: No sensory deficit.   Psychiatric:         Mood and Affect: Mood normal.          Judgment: Judgment normal.       Vital Signs  ED Triage Vitals [01/01/24 2159]   Temperature Pulse Respirations Blood Pressure SpO2   98.5 °F (36.9 °C) (!) 125 18 167/98 96 %      Temp src Heart Rate Source Patient Position - Orthostatic VS BP Location FiO2 (%)   -- -- -- Left arm --      Pain Score       --           Vitals:    01/01/24 2159   BP: 167/98   Pulse: (!) 125         Visual Acuity      ED Medications  Medications   ampicillin-sulbactam (UNASYN) 3 g in sodium chloride 0.9 % 100 mL IVPB (has no administration in time range)   sodium chloride 0.9 % bolus 1,000 mL (has no administration in time range)   ketorolac (TORADOL) injection 15 mg (has no administration in time range)   HYDROmorphone (DILAUDID) injection 1 mg (has no administration in time range)       Diagnostic Studies  Results Reviewed       Procedure Component Value Units Date/Time    Lactic acid [[id number]]     Lab Status: No result Specimen: Blood     Procalcitonin [[id number]]     Lab Status: No result Specimen: Blood     Protime-INR [[id number]]     Lab Status: No result Specimen: Blood     APTT [[id number]]     Lab Status: No result Specimen: Blood     Blood culture #1 [[id number]]     Lab Status: No result Specimen: Blood     Blood culture #2 [[id number]]     Lab Status: No result Specimen: Blood     UA w Reflex to Microscopic w Reflex to Culture [[id number]]     Lab Status: No result Specimen: Urine     POCT pregnancy, urine [[id number]]     Lab Status: No result     Basic metabolic panel [[id number]]     Lab Status: No result Specimen: Blood     CBC and differential [[id number]]     Lab Status: No result Specimen: Blood     Strep A PCR [[id number]]     Lab Status: No result Specimen: Throat     FLU/RSV/COVID - if FLU/RSV clinically relevant [[id number]]     Lab Status: No result Specimen: Nares from Nose                    CT soft tissue neck with contrast    (Results Pending)              Procedures  Procedures         ED Course  ED Course as of  01/02/24 0036   Mon Jan 01, 2024   2211 Dentist tuesday   2347 Heart rate improved on the monitor however poor Plath, occasionally elevated, mostly between 90 and low 100s.   Tue Jan 02, 2024   0005 CT scan viewed and interpreted independnetly by me; mild tonsillitis                                             Medical Decision Making  Amount and/or Complexity of Data Reviewed  Labs: ordered.  Radiology: ordered.    Risk  OTC drugs.  Prescription drug management.           Disposition  Final diagnoses:   None     ED Disposition       None          Follow-up Information    None         Patient's Medications   Discharge Prescriptions    No medications on file       No discharge procedures on file.    PDMP Review       None            ED Provider  Electronically Signed by           RT-PCR assay targets N2,  a region unique to SARS-CoV-2. A conserved region in the E-gene was chosen  for pan-Sarbecovirus detection which includes SARS-CoV-2.    According to CMS-2020-01-R, this platform meets the definition of high-throughput technology.    Procalcitonin [[id number]]  (Normal) Collected: 01/01/24 2244    Lab Status: Final result Specimen: Blood from Arm, Right Updated: 01/01/24 2325     Procalcitonin 0.09 ng/ml     Strep A PCR [[id number]]  (Abnormal) Collected: 01/01/24 2244    Lab Status: Final result Specimen: Throat Updated: 01/01/24 2323     STREP A PCR Detected    Urine Microscopic [[id number]]  (Abnormal) Collected: 01/01/24 2244    Lab Status: Final result Specimen: Urine, Clean Catch Updated: 01/01/24 2319     RBC, UA 1-2 /hpf      WBC, UA 4-10 /hpf      Epithelial Cells Moderate /hpf      Bacteria, UA Moderate /hpf     Basic metabolic panel [[id number]]  (Abnormal) Collected: 01/01/24 2244    Lab Status: Final result Specimen: Blood from Arm, Right Updated: 01/01/24 2318     Sodium 133 mmol/L      Potassium 3.8 mmol/L      Chloride 99 mmol/L      CO2 23 mmol/L      ANION GAP 11 mmol/L      BUN 12 mg/dL      Creatinine 0.56 mg/dL      Glucose 174 mg/dL      Calcium 10.2 mg/dL      eGFR 109 ml/min/1.73sq m     Narrative:      National Kid[employer] Disease Foundation guidelines for Chronic Kidney Disease (CKD):     Stage 1 with normal or high GFR (GFR > 90 mL/min/1.73 square meters)    Stage 2 Mild CKD (GFR = 60-89 mL/min/1.73 square meters)    Stage 3A Moderate CKD (GFR = 45-59 mL/min/1.73 square meters)    Stage 3B Moderate CKD (GFR = 30-44 mL/min/1.73 square meters)    Stage 4 Severe CKD (GFR = 15-29 mL/min/1.73 square meters)    Stage 5 End Stage CKD (GFR <15 mL/min/1.73 square meters)  Note: GFR calculation is accurate only with a steady state creatinine    Protime-INR [[id number]]  (Normal) Collected: 01/01/24 2244    Lab Status: Final result Specimen: Blood from Arm, Right Updated: 01/01/24  2312     Protime 13.2 seconds      INR 0.99    APTT [[id number]]  (Normal) Collected: 01/01/24 2244    Lab Status: Final result Specimen: Blood from Arm, Right Updated: 01/01/24 2312     PTT 29 seconds     UA w Reflex to Microscopic w Reflex to Culture [[id number]]  (Abnormal) Collected: 01/01/24 2244    Lab Status: Final result Specimen: Urine, Clean Catch Updated: 01/01/24 2300     Color, UA Yellow     Clarity, UA Slightly Cloudy     Specific Gravity, UA >=1.030     pH, UA 5.5     Leukocytes, UA 1+     Nitrite, UA Negative     Protein, UA Negative mg/dl      Glucose, UA Negative mg/dl      Ketones, UA 15 (1+) mg/dl      Urobilinogen, UA 0.2 E.U./dl      Bilirubin, UA Negative     Occult Blood, UA Negative    CBC and differential [[id number]]  (Abnormal) Collected: 01/01/24 2244    Lab Status: Final result Specimen: Blood from Arm, Right Updated: 01/01/24 2259     WBC 15.93 Thousand/uL      RBC 5.08 Million/uL      Hemoglobin 14.6 g/dL      Hematocrit 43.0 %      MCV 85 fL      MCH 28.7 pg      MCHC 34.0 g/dL      RDW 12.8 %      MPV 9.8 fL      Platelets 229 Thousands/uL      nRBC 0 /100 WBCs      Neutrophils Relative 80 %      Immat GRANS % 1 %      Lymphocytes Relative 13 %      Monocytes Relative 5 %      Eosinophils Relative 1 %      Basophils Relative 0 %      Neutrophils Absolute 12.76 Thousands/µL      Immature Grans Absolute 0.08 Thousand/uL      Lymphocytes Absolute 2.13 Thousands/µL      Monocytes Absolute 0.84 Thousand/µL      Eosinophils Absolute 0.09 Thousand/µL      Basophils Absolute 0.03 Thousands/µL     POCT pregnancy, urine [[id number]]  (Normal) Resulted: 01/01/24 2244    Lab Status: Final result Updated: 01/01/24 2244     EXT Preg Test, Ur Negative     Control Valid                   CT soft tissue neck with contrast   Final Result by Kiran Lantigua DO (01/02 0021)      Hyperemia of the nasopharyngeal and oropharyngeal mucosa, suspicious for pharyngitis.      In addition there is  enlargement and heterogeneous/tigroid enhancement of the bilateral tonsillar pillars with associated narrowing of the pharynx suspicious for tonsillitis. No discrete tonsillar/peritonsillar abscess identified.      No evidence of retropharyngeal abscess, or epiglottitis.      Other findings as above.      The study was marked in EPIC for immediate notification.      Workstation performed: [id number]                    Procedures  Procedures         ED Course  ED Course as of 01/08/24 0742   Mon Jan 01, 2024   2211 Dentist tuesday 2347 Heart rate improved on the monitor however poor Plath, occasionally elevated, mostly between 90 and low 100s.   Tue Jan 02, 2024   0005 CT scan viewed and interpreted independnetly by me; mild tonsillitis                                             Medical Decision Making  48-year-old female presents with sore throat, fevers at home.  Did have recent dental procedures done within the last couple weeks.  Also with recent dental abscess.  Also complaining of neck pain with flexion and extension.    She is 3+ tonsils on exam without exudates.  Concern for strep pharyngitis however given her recent dental procedure patient is at risk for deeper space infection such as RPA.  Clinically does not appear to be the case, clinically she does also not appear to have any airway involvement or peritonsillar abscess.  She will require labs, and a CT scan to rule out clinically occult infection.    Drug test did come back positive, she does have a mild leukocytosis, her symptoms improved after IV fluids, antipyretics and pain control and her vital signs normalized.  CT scan was reviewed and interpreted by me as no acute disease except for some mild tonsillar swelling.  CT scan was formally read and appreciated.  Patient was reassessed and she is comfortable with discharge with oral antibiotics.  She was also given steroids and analgesia.  Patient was comfortable with this plan.    Problems  Addressed:  History of recent dental procedure: acute illness or injury  Pharyngitis: acute illness or injury  Sore throat: acute illness or injury  Strep pharyngitis: acute illness or injury  Tonsillitis: acute illness or injury    Amount and/or Complexity of Data Reviewed  External Data Reviewed: notes.     Details: Recent ER visits for dental infections  Labs: ordered. Decision-making details documented in ED Course.  Radiology: ordered and independent interpretation performed. Decision-making details documented in ED Course.    Risk  OTC drugs.  Prescription drug management.  Parenteral controlled substances.             Disposition  Final diagnoses:   Pharyngitis   Sore throat   Tonsillitis   Strep pharyngitis   History of recent dental procedure     Time reflects when diagnosis was documented in both MDM as applicable and the Disposition within this note       Time User Action Codes Description Comment    1/2/2024 12:29 AM Feliciano Fuentes [J02.9] Pharyngitis     1/2/2024 12:29 AM Feliciano Fuentes [J02.9] Sore throat     1/2/2024 12:29 AM Feliciano Fuentes [J03.90] Tonsillitis     1/2/2024 12:29 AM Feliciano Fuentes [J02.0] Strep pharyngitis     1/2/2024 12:30 AM Feliciano Fuentes [Z98.890] History of recent dental procedure           ED Disposition       ED Disposition   Discharge    Condition   Stable    Date/Time   Tue Jan 2, 2024 12:29 AM    Comment   Diamond Banegas discharge to home/self care.                   Follow-up Information       Follow up With Specialties Details Why Contact Info    Nathan Gann MD Family Medicine  As needed [address] [place] [address]  [phone]              Discharge Medication List as of 1/2/2024 12:31 AM        START taking these medications    Details   amoxicillin-clavulanate (AUGMENTIN) 875-125 mg per tablet Take 1 tablet by mouth every 12 (twelve) hours for 10 days, Starting Tue 1/2/2024, Until Fri 1/12/2024, Normal            CONTINUE these medications which have NOT CHANGED    Details   Celecoxib (CELEBREX PO) Take 200 mg by mouth daily as needed , Historical Med      gabapentin (NEURONTIN) 300 mg capsule Take 300 mg by mouth 2 (two) times a day as needed , Historical Med      methocarbamol (ROBAXIN) 500 mg tablet Take 1 tablet (500 mg total) by mouth 2 (two) times a day, Starting Sat 7/30/2022, Normal      methylPREDNISolone 4 MG tablet therapy pack Use as directed on package, Normal             No discharge procedures on file.    PDMP Review       None            ED Provider  Electronically Signed by             Feliciano Fuentes DO  01/08/24 0742

## 2024-01-07 LAB
BACTERIA BLD CULT: NORMAL
BACTERIA BLD CULT: NORMAL

## 2024-02-21 PROBLEM — Z12.4 CERVICAL CANCER SCREENING: Status: RESOLVED | Noted: 2019-04-04 | Resolved: 2024-02-21

## 2024-06-16 ENCOUNTER — APPOINTMENT (EMERGENCY)
Dept: RADIOLOGY | Facility: HOSPITAL | Age: 49
End: 2024-06-16
Payer: COMMERCIAL

## 2024-06-16 ENCOUNTER — HOSPITAL ENCOUNTER (EMERGENCY)
Facility: HOSPITAL | Age: 49
Discharge: HOME | End: 2024-06-17
Attending: EMERGENCY MEDICINE
Payer: COMMERCIAL

## 2024-06-16 VITALS
HEART RATE: 101 BPM | TEMPERATURE: 97.7 F | DIASTOLIC BLOOD PRESSURE: 97 MMHG | HEIGHT: 64 IN | OXYGEN SATURATION: 97 % | WEIGHT: 260 LBS | RESPIRATION RATE: 18 BRPM | SYSTOLIC BLOOD PRESSURE: 177 MMHG | BODY MASS INDEX: 44.39 KG/M2

## 2024-06-16 DIAGNOSIS — M25.561 ACUTE PAIN OF RIGHT KNEE: Primary | ICD-10-CM

## 2024-06-16 DIAGNOSIS — M25.551 RIGHT HIP PAIN: ICD-10-CM

## 2024-06-16 PROCEDURE — 73502 X-RAY EXAM HIP UNI 2-3 VIEWS: CPT

## 2024-06-16 PROCEDURE — 99284 EMERGENCY DEPT VISIT MOD MDM: CPT | Performed by: EMERGENCY MEDICINE

## 2024-06-16 PROCEDURE — 99283 EMERGENCY DEPT VISIT LOW MDM: CPT

## 2024-06-16 PROCEDURE — 73564 X-RAY EXAM KNEE 4 OR MORE: CPT

## 2024-06-16 RX ORDER — IBUPROFEN 600 MG/1
600 TABLET ORAL ONCE
Status: COMPLETED | OUTPATIENT
Start: 2024-06-16 | End: 2024-06-16

## 2024-06-16 RX ADMIN — IBUPROFEN 600 MG: 600 TABLET, FILM COATED ORAL at 22:22

## 2024-06-17 ENCOUNTER — OFFICE VISIT (OUTPATIENT)
Dept: OBGYN CLINIC | Facility: CLINIC | Age: 49
End: 2024-06-17
Payer: COMMERCIAL

## 2024-06-17 VITALS
BODY MASS INDEX: 44.39 KG/M2 | HEART RATE: 101 BPM | SYSTOLIC BLOOD PRESSURE: 157 MMHG | WEIGHT: 260 LBS | HEIGHT: 64 IN | DIASTOLIC BLOOD PRESSURE: 92 MMHG

## 2024-06-17 DIAGNOSIS — M25.561 ACUTE PAIN OF RIGHT KNEE: ICD-10-CM

## 2024-06-17 DIAGNOSIS — M23.91 INTERNAL DERANGEMENT OF RIGHT KNEE: ICD-10-CM

## 2024-06-17 DIAGNOSIS — Z01.89 ENCOUNTER FOR LOWER EXTREMITY COMPARISON IMAGING STUDY: ICD-10-CM

## 2024-06-17 DIAGNOSIS — M25.551 RIGHT HIP PAIN: ICD-10-CM

## 2024-06-17 DIAGNOSIS — M54.16 RADICULOPATHY, LUMBAR REGION: ICD-10-CM

## 2024-06-17 DIAGNOSIS — M25.561 RIGHT KNEE PAIN, UNSPECIFIED CHRONICITY: Primary | ICD-10-CM

## 2024-06-17 PROCEDURE — 99204 OFFICE O/P NEW MOD 45 MIN: CPT | Performed by: STUDENT IN AN ORGANIZED HEALTH CARE EDUCATION/TRAINING PROGRAM

## 2024-06-17 RX ORDER — MELOXICAM 15 MG/1
15 TABLET ORAL DAILY
Qty: 28 TABLET | Refills: 0 | Status: SHIPPED | OUTPATIENT
Start: 2024-06-17 | End: 2024-07-15

## 2024-06-17 NOTE — DISCHARGE INSTRUCTIONS
If you develop any new or worsening symptoms please immediately return to your nearest emergency department.    Please make an appt with orthopedics as soon as possible.

## 2024-06-17 NOTE — ED PROVIDER NOTES
History  Chief Complaint   Patient presents with    Knee Pain     Right knee pain; twisted her knee a couple of days ago but irritated it again today; pain in knee and hip    Hip Pain     50 yo female presenting to the ed for multiple injuries to the R knee and having pain with certain movement especially with bending the knee. Has twisted the knee on multiple occasions over the last month.         Prior to Admission Medications   Prescriptions Last Dose Informant Patient Reported? Taking?   Celecoxib (CELEBREX PO)   Yes No   Sig: Take 200 mg by mouth daily as needed    gabapentin (NEURONTIN) 300 mg capsule   Yes No   Sig: Take 300 mg by mouth 2 (two) times a day as needed    methocarbamol (ROBAXIN) 500 mg tablet   No No   Sig: Take 1 tablet (500 mg total) by mouth 2 (two) times a day   methylPREDNISolone 4 MG tablet therapy pack   No No   Sig: Use as directed on package      Facility-Administered Medications: None       Past Medical History:   Diagnosis Date    Anemia     Arthritis     Bronchitis     Chronic pain disorder     L ankle    History of transfusion     no adverse reactions- 04/01/2019 last one    Hypertension     being monitored by PCP     Pneumonia     jan/feb 2019    Wears glasses        Past Surgical History:   Procedure Laterality Date    ANKLE ARTHROSCOPY      had 4 ankle surgeries on L foot; 2010; Oct 2017; Feb 2018    ANKLE LIGAMENT RECONSTRUCTION Right     BREAST BIOPSY Left 2016    clip    CHOLECYSTECTOMY      COLONOSCOPY      DILATION AND CURETTAGE OF UTERUS WITH HYSTEROSOCPY N/A 4/15/2019    Procedure: D&C, HYSTEROSCOPY;  Surgeon: Luis Antonio Vo MD;  [facility];  Service: Gynecology    HEEL SPUR EXCISION Right 2007    NC HYSTEROSCOPY ENDOMETRIAL ABLATION N/A 4/15/2019    Procedure: NOVASURE ABLATION;  Surgeon: Luis Antonio Vo MD;  Location: [facility];  Service: Gynecology    TUBAL LIGATION         Family History   Problem Relation Age of Onset    Cancer Father      Breast cancer Paternal Grandmother 44    Breast cancer Paternal Aunt 50    Breast cancer Cousin 26     I have reviewed and agree with the history as documented.    E-Cigarette/Vaping    E-Cigarette Use Never User      E-Cigarette/Vaping Substances    Nicotine No     THC No     CBD No     Flavoring No     Other No     Unknown No      Social History     Tobacco Use    Smoking status: Every Day     Current packs/day: 0.50     Average packs/day: 0.5 packs/day for 15.0 years (7.5 ttl pk-yrs)     Types: Cigarettes    Smokeless tobacco: Never   Vaping Use    Vaping status: Never Used   Substance Use Topics    Alcohol use: Yes     Alcohol/week: 2.0 standard drinks of alcohol     Types: 1 Glasses of wine, 1 Shots of liquor per week     Comment: weekends    Drug use: No       Review of Systems   Musculoskeletal:  Positive for arthralgias.   All other systems reviewed and are negative.      Physical Exam  Physical Exam  Vitals and nursing note reviewed.   Constitutional:       General: She is not in acute distress.     Appearance: She is well-developed. She is not diaphoretic.   HENT:      Head: Normocephalic and atraumatic.      Right Ear: External ear normal.      Left Ear: External ear normal.      Nose: Nose normal.   Eyes:      General: No scleral icterus.        Right eye: No discharge.         Left eye: No discharge.      Extraocular Movements: EOM normal.      Conjunctiva/sclera: Conjunctivae normal.      Pupils: Pupils are equal, round, and reactive to light.   Neck:      Vascular: No JVD.      Trachea: No tracheal deviation.   Cardiovascular:      Rate and Rhythm: Normal rate and regular rhythm.      Heart sounds: Normal heart sounds. No murmur heard.     No friction rub. No gallop.   Pulmonary:      Effort: Pulmonary effort is normal. No respiratory distress.      Breath sounds: Normal breath sounds. No stridor. No wheezing or rales.   Abdominal:      General: Bowel sounds are normal. There is no distension.       Palpations: Abdomen is soft. There is no mass.      Tenderness: There is no abdominal tenderness. There is no guarding.   Musculoskeletal:         General: Tenderness present. No deformity, signs of injury or edema.      Cervical back: Normal range of motion and neck supple.      Right knee: Bony tenderness present. No swelling, deformity, effusion, erythema, ecchymosis, lacerations or crepitus. Decreased range of motion. No tenderness. No LCL laxity, MCL laxity, ACL laxity or PCL laxity. Normal alignment, normal meniscus and normal patellar mobility. Normal pulse.      Instability Tests: Anterior drawer test negative. Posterior drawer test negative. Anterior Lachman test negative. Medial Isabelle test negative and lateral Isabelle test negative.      Left knee: Normal.      Right lower leg: No swelling. No edema.      Left lower leg: No swelling. No edema.   Skin:     General: Skin is warm and dry.      Coloration: Skin is not pale.      Findings: No erythema or rash.   Neurological:      Mental Status: She is alert and oriented to person, place, and time.      Cranial Nerves: No cranial nerve deficit.      Sensory: No sensory deficit.      Motor: No abnormal muscle tone.   Psychiatric:         Mood and Affect: Mood and affect normal.         Behavior: Behavior normal.         Thought Content: Thought content normal.         Judgment: Judgment normal.         Vital Signs  ED Triage Vitals [06/16/24 2208]   Temperature Pulse Respirations Blood Pressure SpO2   97.7 °F (36.5 °C) 101 18 (!) 177/97 97 %      Temp src Heart Rate Source Patient Position - Orthostatic VS BP Location FiO2 (%)   -- Monitor -- Left arm --      Pain Score       9           Vitals:    06/16/24 2208   BP: (!) 177/97   Pulse: 101         Visual Acuity      ED Medications  Medications   ibuprofen (MOTRIN) tablet 600 mg (600 mg Oral Given 6/16/24 2222)       Diagnostic Studies  Results Reviewed       None                   XR knee 4+ vw right  injury   ED Interpretation by Stevenson Rodriguez DO (06/16 2255)   No acute fracture or dislocation noted on my interpretation          XR hip/pelv 2-3 vws right if performed   ED Interpretation by Stevenson Rodriguez DO (06/16 2255)   No acute fracture or dislocation noted on my interpretation                     Procedures  Procedures         ED Course  ED Course as of 06/17/24 0002   Sun Jun 16, 2024 2255 Patient stating does not want any medications stronger than ibuprofen as she drove herself here.    2338 Discussed pain medications with patient patient states she is fine with Tylenol and Motrin at home.   2359 Patient feeling better with knee immobilizer, discussed with her can't drive with knee immobilizer on and to be careful as it can increase risk of falling, states her understanding of this.                                SBIRT 22yo+      Flowsheet Row Most Recent Value   Initial Alcohol Screen: US AUDIT-C     1. How often do you have a drink containing alcohol? 0 Filed at: 06/16/2024 2345   2. How many drinks containing alcohol do you have on a typical day you are drinking?  0 Filed at: 06/16/2024 2345   3a. Male UNDER 65: How often do you have five or more drinks on one occasion? 0 Filed at: 06/16/2024 2345   3b. FEMALE Any Age, or MALE 65+: How often do you have 4 or more drinks on one occassion? 0 Filed at: 06/16/2024 2345   Audit-C Score 0 Filed at: 06/16/2024 2345   SITA: How many times in the past year have you...    Used an illegal drug or used a prescription medication for non-medical reasons? Never Filed at: 06/16/2024 2345                      Medical Decision Making  Pain with full extension and flexion of the right leg.  No obvious ligamentous laxity or meniscal pain with manipulation.  Patient given ibuprofen requesting ice pack.  Feeling better with knee immobilizer.  Strict return precaution discussed and provided and recommend follow with orthopedics.    Amount and/or Complexity of  Data Reviewed  Radiology: ordered and independent interpretation performed.    Risk  Prescription drug management.             Disposition  Final diagnoses:   Acute pain of right knee   Right hip pain     Time reflects when diagnosis was documented in both MDM as applicable and the Disposition within this note       Time User Action Codes Description Comment    6/16/2024 10:19 PM Stevenson Rodriguez Add [M25.561] Acute pain of right knee     6/16/2024 10:19 PM Stevenson Rodriguez Add [M25.551] Right hip pain           ED Disposition       ED Disposition   Discharge    Condition   Stable    Date/Time   Sun Jun 16, 2024 2305    Comment   Diamond Bassam discharge to home/self care.                   Follow-up Information       Follow up With Specialties Details Why Contact Info Additional Information    [facility] Schedule an appointment as soon as possible for a visit   [address] [place] [id number] [phone] [facility] [place], [address] [place] [address] Ave, [place], [address], [phone]    [facility] Emergency Medicine Go to  As needed, If symptoms worsen [address] [facility] [address]  [phone] [facility], [address] [facility], [place] [address]    Akin Torres,  Family Medicine   [address] [place] [address]  [phone]               Discharge Medication List as of 6/16/2024 11:57 PM        CONTINUE these medications which have NOT CHANGED    Details   Celecoxib (CELEBREX PO) Take 200 mg by mouth daily as needed , Historical Med      gabapentin (NEURONTIN) 300 mg capsule Take 300 mg by mouth 2 (two) times a day as needed , Historical Med      methocarbamol (ROBAXIN) 500 mg tablet Take 1 tablet (500 mg total) by mouth 2 (two) times a day, Starting Sat 7/30/2022, Normal       methylPREDNISolone 4 MG tablet therapy pack Use as directed on package, Normal                 PDMP Review       None            ED Provider  Electronically Signed by             Stevenson Rodrigeuz,   06/17/24 0002

## 2024-06-17 NOTE — PROGRESS NOTES
Ortho Sports Medicine Knee New Patient Visit     Assesment:   49 y.o. female with right knee pain for approximately 1 month after an injury and exam concerning for meniscal pathology    Plan:  I reviewed the history, exam, and imaging with the patient plan today.  I did review the patient's x-rays which do not show significant degenerative changes, fracture, or dislocation.  On exam, the patient does have tenderness over the joint line concerning for possible meniscus tear.  Discussed with the patient that the neck step would be to get an MRI to evaluate for meniscal pathology.  I also provided the patient with an anti-inflammatory medication help with pain and swelling.  I discussed the patient is follow-up after the MRI to the review the results and discuss further treatment options.  The patient also discussed that she is having symptoms concerning for lumbar radiculopathy.  I did provide her with a referral to spine and pain management for further evaluation.  I discussed with the patient to follow-up after the MRI is complete to discuss results and further treatment options.  Patient demonstrated understanding the discussion was in agreement the plan.  All of her questions were answered.  She can reach out to clinic with any question concerns anytime.    Conservative treatment:  Ice to knee for 20 minutes at least 1-2 times daily.  Tylenol for pain.  Let pain guide gradual return activities.  Referral provided to Spine and Pain Management.  Prescription NSAIDs for pain (meloxicam - in place of any OTC NSAIDs).  Continue ambulating with a cane.    Imaging:  All imaging from today was reviewed by myself and explained to the patient.   We will obtain an MRI of the knee to rule out a meniscus tear with a flipped fragment.  Follow up in 1-2 weeks for MRI review. Will make a definitive treatment plan based on the results of the MRI.    Injection:  No Injection planned at this time.    Surgery:   No surgery is  recommended at this point, continue with conservative treatment plan as noted.    Follow up:  Return for Recheck after MRI.        Chief Complaint   Patient presents with    Right Knee - Pain    Right Hip - Pain       History of Present Illness:  The patient is a 49 y.o. female seen in clinic for right hip and knee pain. The pain started about 1 month ago and worsened 2 weeks ago. The mechanism of injury was a fall initially that caused a hyperflexion of the hip and extension of the knee. The second more recent injury involved a twisting injury of the knee. The patient felt better after three days following the original fall without any ecchymosis noted in the thigh. The second injury involved her foot getting stuck in her pants as she was getting them on, causing her to twist the knee and she felt a pop. She states she had severe pain. The patient went to the ED yesterday and provided a knee immobilizer. Today she states she has had the worst pain yet. The pain is now located anteriorly and medially and is associated with swelling, limited ROM, catching, locking and weakness. The patient characterizes the intensity of pain as a 10 out of 10 at worst and 0 at rest. Symptoms are aggravated by movement. The patient has tried ibuprofen and a knee immobilizer. Symptoms have worsened since the injury. Patient has a history of prior hip pain. The patient states she has a history of left ankle issues/surgeries which cause her frequent falls/instability. She states she also has tingling in the leg. She notes pain in the calf following the initial injury that then transferred to the thigh. Patient states she has a history of sciatica. She states when she bends forward and then stands she has difficulty walking afterwards.     Occupation: not employed    The patient has the following co-morbidities: n/a      Knee Surgical History:  None    Past Medical, Social and Family History:  Past Medical History:   Diagnosis Date     Anemia     Arthritis     Bronchitis     Chronic pain disorder     L ankle    History of transfusion     no adverse reactions- 04/01/2019 last one    Hypertension     being monitored by PCP     Pneumonia     jan/feb 2019    Wears glasses      Past Surgical History:   Procedure Laterality Date    ANKLE ARTHROSCOPY      had 4 ankle surgeries on L foot; 2010; Oct 2017; Feb 2018    ANKLE LIGAMENT RECONSTRUCTION Right     BREAST BIOPSY Left 2016    clip    CHOLECYSTECTOMY      COLONOSCOPY      DILATION AND CURETTAGE OF UTERUS WITH HYSTEROSOCPY N/A 4/15/2019    Procedure: D&C, HYSTEROSCOPY;  Surgeon: Luis Antonio Vo MD;  [facility];  Service: Gynecology    HEEL SPUR EXCISION Right 2007    MD HYSTEROSCOPY ENDOMETRIAL ABLATION N/A 4/15/2019    Procedure: NOVASURE ABLATION;  Surgeon: Luis Antonio Vo MD;  [facility];  Service: Gynecology    TUBAL LIGATION       No Known Allergies  Current Outpatient Medications on File Prior to Visit   Medication Sig Dispense Refill    Celecoxib (CELEBREX PO) Take 200 mg by mouth daily as needed  (Patient not taking: Reported on 6/17/2024)      gabapentin (NEURONTIN) 300 mg capsule Take 300 mg by mouth 2 (two) times a day as needed  (Patient not taking: Reported on 6/17/2024)      methocarbamol (ROBAXIN) 500 mg tablet Take 1 tablet (500 mg total) by mouth 2 (two) times a day (Patient not taking: Reported on 6/17/2024) 20 tablet 0    methylPREDNISolone 4 MG tablet therapy pack Use as directed on package (Patient not taking: Reported on 6/17/2024) 21 tablet 0     No current facility-administered medications on file prior to visit.     Social History     Socioeconomic History    Marital status: Single     Spouse name: Not on file    Number of children: Not on file    Years of education: Not on file    Highest education level: Not on file   Occupational History    Not on file   Tobacco Use    Smoking status: Every Day     Current packs/day: 0.50     Average  "packs/day: 0.5 packs/day for 15.0 years (7.5 ttl pk-yrs)     Types: Cigarettes    Smokeless tobacco: Never   Vaping Use    Vaping status: Never Used   Substance and Sexual Activity    Alcohol use: Yes     Alcohol/week: 2.0 standard drinks of alcohol     Types: 1 Glasses of wine, 1 Shots of liquor per week     Comment: weekends    Drug use: No    Sexual activity: Not Currently   Other Topics Concern    Not on file   Social History Narrative    Not on file     Social Determinants of Health     Financial Resource Strain: Not on file   Food Insecurity: Not on file   Transportation Needs: Not on file   Physical Activity: Not on file   Stress: Not on file   Social Connections: Not on file   Intimate Partner Violence: Not on file   Housing Stability: Not on file         I have reviewed the past medical, surgical, social and family history, medications and allergies as documented in the EMR.    Review of systems: ROS is negative other than that noted in the HPI.  Psychiatric/Behavioral: Negative for agitation.      Physical Exam:    Blood pressure 157/92, pulse 101, height 5' 4\" (1.626 m), weight 118 kg (260 lb), not currently breastfeeding.    General/Constitutional: NAD, well developed, well nourished  HENT: Normocephalic, atraumatic  CV: Intact distal pulses, regular rate  Resp: No respiratory distress or labored breathing  Neuro: Alert and Oriented x 3  Psych: Normal mood, normal affect, normal judgement, normal behavior  Skin: Warm, dry, no rashes, no erythema      Focused right knee exam:  Ambulates with an antalgic gait.    Skin is intact. No evidence of ecchymosis, erythema, gross deformity or previous surgical incisions. moderate effusion.     Negative squat and Thessaly tests.    Palpation of the knee demonstrates no tenderness over the medial patellar facet, lateral patellar facet, tibial tubercle or patellar tendon.  There is no tenderness over the pes anserine bursa.  There is no tenderness over Gerdy's " tubercle. There is no tenderness in the popliteal fossa.  There is no tenderness over the medial or lateral epicondyle.  There is no tenderness with palpation over the posterior calf without palpable cords.    Range of motion testing demonstrates that the patient is able to achieve +10 degrees of extension and 80 degrees of flexion. There is no pain with hyperflexion or hyperextension.  There is negative patellar crepitus. The patient is able to do a straight leg raise.      Strength testing demonstrates 5/5 strength with hip flexion, 5/5 knee extension, 5/5 knee flexion, and 5/5 dorsiflexion and plantarflexion.    Provocation testing demonstrates  Mensicus- positive medial joint line tenderness, negative lateral joint line tenderness, negative Isabelle's, negative flexion compression.  Collateral ligaments- negative varus laxity at full extension and in 30° of knee flexion, negative valgus laxity at full extension and in 30° of knee flexion.  Cruciate ligament- 1A Lachman examination, negative pivot shift test, 1A anterior drawer, 1A posterior drawer, negative posterior sag.  Patella- negative apprehension with lateral patellar translation, negative apprehension with medial patellar translation, negative J-sign, negative patellar grind test, negative tight lateral patellar tilt.    Range of motion testing of the hip and ankle are within normal limits.    No calf tenderness to palpation bilaterally    LE NV Exam: +2 DP/PT pulses bilaterally  Sensation intact to light touch L2-S1 bilaterally, SPN/DPN/TA motor intact       Knee Imaging    X-rays of the right knee and hip were obtained on 6/16/24 and 6/17/24 and reviewed with the patient. Per my independent review, the imaging shows no acute osseous abnormality.      Scribe Attestation      I,:  Whitney Hernandez PA-C am acting as a scribe while in the presence of the attending physician.:       I,:  Ortiz Negrete MD personally performed the services described in this  documentation    as scribed in my presence.:

## 2024-06-25 ENCOUNTER — TELEPHONE (OUTPATIENT)
Age: 49
End: 2024-06-25

## 2024-06-25 NOTE — TELEPHONE ENCOUNTER
Entered patients chart to verify information that patient requested for Spine and Pain doctor.  Provided name of doctor and phone number.

## 2024-06-27 ENCOUNTER — HOSPITAL ENCOUNTER (OUTPATIENT)
Dept: RADIOLOGY | Age: 49
Discharge: HOME | End: 2024-06-27
Attending: STUDENT IN AN ORGANIZED HEALTH CARE EDUCATION/TRAINING PROGRAM
Payer: COMMERCIAL

## 2024-06-27 DIAGNOSIS — M23.91 INTERNAL DERANGEMENT OF RIGHT KNEE: ICD-10-CM

## 2024-06-27 PROCEDURE — 73721 MRI JNT OF LWR EXTRE W/O DYE: CPT

## 2024-07-02 ENCOUNTER — OFFICE VISIT (OUTPATIENT)
Dept: FAMILY MEDICINE CLINIC | Facility: CLINIC | Age: 49
End: 2024-07-02
Payer: COMMERCIAL

## 2024-07-02 VITALS
TEMPERATURE: 97.6 F | RESPIRATION RATE: 18 BRPM | HEART RATE: 95 BPM | WEIGHT: 255.2 LBS | DIASTOLIC BLOOD PRESSURE: 85 MMHG | BODY MASS INDEX: 43.57 KG/M2 | SYSTOLIC BLOOD PRESSURE: 148 MMHG | HEIGHT: 64 IN | OXYGEN SATURATION: 96 %

## 2024-07-02 DIAGNOSIS — Z11.59 NEED FOR HEPATITIS C SCREENING TEST: ICD-10-CM

## 2024-07-02 DIAGNOSIS — M95.8 OSTEOCHONDRAL DEFECT OF ANKLE: ICD-10-CM

## 2024-07-02 DIAGNOSIS — D50.8 OTHER IRON DEFICIENCY ANEMIA: ICD-10-CM

## 2024-07-02 DIAGNOSIS — I10 ESSENTIAL HYPERTENSION: ICD-10-CM

## 2024-07-02 DIAGNOSIS — R53.82 CHRONIC FATIGUE: ICD-10-CM

## 2024-07-02 DIAGNOSIS — Z12.31 ENCOUNTER FOR SCREENING MAMMOGRAM FOR BREAST CANCER: ICD-10-CM

## 2024-07-02 DIAGNOSIS — E66.01 MORBID OBESITY (HCC): Primary | ICD-10-CM

## 2024-07-02 DIAGNOSIS — Z12.11 ENCOUNTER FOR SCREENING COLONOSCOPY: ICD-10-CM

## 2024-07-02 DIAGNOSIS — R39.89 URINE DISCOLORATION: ICD-10-CM

## 2024-07-02 DIAGNOSIS — Z12.4 SCREENING FOR CERVICAL CANCER: ICD-10-CM

## 2024-07-02 PROCEDURE — 99203 OFFICE O/P NEW LOW 30 MIN: CPT | Performed by: NURSE PRACTITIONER

## 2024-07-02 RX ORDER — LOSARTAN POTASSIUM 25 MG/1
25 TABLET ORAL DAILY
Qty: 30 TABLET | Refills: 0 | Status: SHIPPED | OUTPATIENT
Start: 2024-07-02

## 2024-07-02 NOTE — PROGRESS NOTES
OFFICE VISIT  Diamond Banegas 49 y.o. female MRN: [id number]      Depression Screening and Follow-up Plan: Patient's depression screening was positive with a PHQ-2 score of 6. Their PHQ-9 score was 14. Patient assessed for underlying major depression. Brief counseling provided and recommend additional follow-up/re-evaluation next office visit.     Tobacco Cessation Counseling: Tobacco cessation counseling was provided. The patient is sincerely urged to quit consumption of tobacco. She is ready to quit tobacco. Medication options discussed.          Assessment / Plan:  Problem List Items Addressed This Visit          Blood    Absolute anemia    Relevant Orders    Iron Panel (Includes Ferritin, Iron Sat%, Iron, and TIBC)       Other    Morbid obesity (HCC) - Primary     -lifestyle modifications  -obtain baseline  labs          Relevant Orders    Hemoglobin A1C    CBC and differential    Comprehensive metabolic panel    TSH, 3rd generation with Free T4 reflex    Lipid Panel with Direct LDL reflex    Chronic fatigue    Relevant Orders    Vitamin D 25 hydroxy     Other Visit Diagnoses       Encounter for screening colonoscopy        Relevant Orders    Ambulatory Referral to Gastroenterology    Screening for cervical cancer        Relevant Orders    Ambulatory referral to Obstetrics / Gynecology    Encounter for screening mammogram for breast cancer        Relevant Orders    Mammo screening bilateral w 3d & cad    Need for hepatitis C screening test        Relevant Orders    Hepatitis C Antibody    Essential hypertension        Relevant Medications    losartan (COZAAR) 25 mg tablet    Urine discoloration        Relevant Orders    UA (URINE) with reflex to Scope    Osteochondral defect of ankle        Relevant Orders    Ambulatory Referral to Orthopedic Surgery    XR ankle 3+ vw left              Reason For Visit / Chief Complaint  Chief Complaint   Patient presents with    Establish Care     Est care    Dp f/u plan  Pt  reports in past 6 month having bowel issues, having some incontinence at times    Hypertension        HPI:  Diamond Banegas is a 49 y.o. female who presents today to [facility] care  Last seen by a PCP, years ago. Recent ED visit for knee pain  Following with ortho. Has been having elevated blood pressure readings. On no medication. She reports over the last 6 months, more fatigue. Does have know abnormality to left and right ankle, see mri    Historical Information   Past Medical History:   Diagnosis Date    Anemia     Arthritis     Bronchitis     Chronic pain disorder     L ankle    History of transfusion     no adverse reactions- 04/01/2019 last one    Hypertension     being monitored by PCP     Pneumonia     jan/feb 2019    Wears glasses      Past Surgical History:   Procedure Laterality Date    ANKLE ARTHROSCOPY      had 4 ankle surgeries on L foot; 2010; Oct 2017; Feb 2018    ANKLE LIGAMENT RECONSTRUCTION Right     BREAST BIOPSY Left 2016    clip    CHOLECYSTECTOMY      COLONOSCOPY      DILATION AND CURETTAGE OF UTERUS WITH HYSTEROSOCPY N/A 4/15/2019    Procedure: D&C, HYSTEROSCOPY;  Surgeon: Luis Antonio Vo MD;  Location: [facility];  Service: Gynecology    HEEL SPUR EXCISION Right 2007    [facility] HYSTEROSCOPY ENDOMETRIAL ABLATION N/A 4/15/2019    Procedure: NOVASURE ABLATION;  Surgeon: Luis Antonio Vo MD;  Location: [facility];  Service: Gynecology    TUBAL LIGATION       Social History   Social History     Substance and Sexual Activity   Alcohol Use Yes    Alcohol/week: 2.0 standard drinks of alcohol    Types: 1 Glasses of wine, 1 Shots of liquor per week    Comment: weekends     Social History     Substance and Sexual Activity   Drug Use No     Social History     Tobacco Use   Smoking Status Every Day    Current packs/day: 0.50    Average packs/day: 0.5 packs/day for 15.0 years (7.5 ttl pk-yrs)    Types: Cigarettes    Start date: 7/2/2009   Smokeless Tobacco Never     Family History   Problem Relation Age  "of Onset    Cancer Father     Breast cancer Paternal Grandmother 44    Breast cancer Paternal Aunt 50    Breast cancer Cousin 26       Meds/Allergies   No Known Allergies    Meds:    Current Outpatient Medications:     losartan (COZAAR) 25 mg tablet, Take 1 tablet (25 mg total) by mouth daily, Disp: 30 tablet, Rfl: 0    meloxicam (Mobic) 15 mg tablet, Take 1 tablet (15 mg total) by mouth daily for 28 days, Disp: 28 tablet, Rfl: 0      REVIEW OF SYSTEMS  Review of Systems   Constitutional:  Negative for chills, fatigue and fever.   HENT:  Negative for congestion, ear discharge, ear pain, sore throat, trouble swallowing and voice change.    Eyes:  Negative for pain and redness.   Respiratory:  Negative for cough, chest tightness, shortness of breath and wheezing.    Gastrointestinal:  Negative for abdominal pain, blood in stool, constipation, diarrhea, nausea and vomiting.   Endocrine: Negative for cold intolerance, heat intolerance, polydipsia, polyphagia and polyuria.   Genitourinary:  Negative for decreased urine volume, dysuria, frequency and urgency.   Musculoskeletal:  Positive for arthralgias and gait problem. Negative for back pain, myalgias and neck pain.        Using cane    Skin:  Negative for color change and rash.   Neurological:  Negative for dizziness, syncope, weakness, light-headedness, numbness and headaches.   Psychiatric/Behavioral:  Negative for sleep disturbance and suicidal ideas. The patient is not nervous/anxious.            Current Vitals:   Blood Pressure: 148/85 (07/02/24 1442)  Pulse: 95 (07/02/24 1442)  Temperature: 97.6 °F (36.4 °C) (07/02/24 1442)  Temp Source: Tympanic (07/02/24 1442)  Respirations: 18 (07/02/24 1442)  Height: 5' 4\" (162.6 cm) (07/02/24 1442)  Weight - Scale: 116 kg (255 lb 3.2 oz) (07/02/24 1442)  SpO2: 96 % (07/02/24 1442)  [unfilled]    PHYSICAL EXAMS:  Physical Exam  Vitals and nursing note reviewed.   Constitutional:       Appearance: She is obese.   HENT:      " Head: Normocephalic and atraumatic.   Cardiovascular:      Rate and Rhythm: Normal rate and regular rhythm.      Pulses: Normal pulses.      Heart sounds: Normal heart sounds.   Pulmonary:      Effort: Pulmonary effort is normal.      Breath sounds: Normal breath sounds.   Musculoskeletal:         General: Normal range of motion.   Skin:     General: Skin is warm.   Neurological:      General: No focal deficit present.      Mental Status: She is alert and oriented to person, place, and time.   Psychiatric:         Mood and Affect: Mood normal.         Behavior: Behavior normal.         Thought Content: Thought content normal.         Judgment: Judgment normal.             Lab, imaging and other studies: I have personally reviewed pertinent reports.  .

## 2024-07-12 ENCOUNTER — OFFICE VISIT (OUTPATIENT)
Dept: GASTROENTEROLOGY | Facility: CLINIC | Age: 49
End: 2024-07-12
Payer: COMMERCIAL

## 2024-07-12 ENCOUNTER — LAB (OUTPATIENT)
Dept: LAB | Facility: CLINIC | Age: 49
End: 2024-07-12
Payer: COMMERCIAL

## 2024-07-12 VITALS
SYSTOLIC BLOOD PRESSURE: 144 MMHG | WEIGHT: 255 LBS | BODY MASS INDEX: 43.54 KG/M2 | DIASTOLIC BLOOD PRESSURE: 100 MMHG | HEIGHT: 64 IN | HEART RATE: 68 BPM | OXYGEN SATURATION: 98 %

## 2024-07-12 DIAGNOSIS — R39.89 URINE DISCOLORATION: ICD-10-CM

## 2024-07-12 DIAGNOSIS — Z11.59 NEED FOR HEPATITIS C SCREENING TEST: ICD-10-CM

## 2024-07-12 DIAGNOSIS — E66.01 MORBID OBESITY (HCC): ICD-10-CM

## 2024-07-12 DIAGNOSIS — K62.5 RECTAL BLEEDING: ICD-10-CM

## 2024-07-12 DIAGNOSIS — R19.8 ALTERNATING CONSTIPATION AND DIARRHEA: Primary | ICD-10-CM

## 2024-07-12 DIAGNOSIS — Z86.010 HISTORY OF COLON POLYPS: ICD-10-CM

## 2024-07-12 DIAGNOSIS — R53.82 CHRONIC FATIGUE: ICD-10-CM

## 2024-07-12 DIAGNOSIS — D50.8 OTHER IRON DEFICIENCY ANEMIA: ICD-10-CM

## 2024-07-12 DIAGNOSIS — R15.9 FULL INCONTINENCE OF FECES: ICD-10-CM

## 2024-07-12 LAB
25(OH)D3 SERPL-MCNC: 11.7 NG/ML (ref 30–100)
ALBUMIN SERPL BCG-MCNC: 3.9 G/DL (ref 3.5–5)
ALP SERPL-CCNC: 52 U/L (ref 34–104)
ALT SERPL W P-5'-P-CCNC: 26 U/L (ref 7–52)
ANION GAP SERPL CALCULATED.3IONS-SCNC: 7 MMOL/L (ref 4–13)
AST SERPL W P-5'-P-CCNC: 18 U/L (ref 13–39)
BACTERIA UR QL AUTO: ABNORMAL /HPF
BASOPHILS # BLD AUTO: 0.04 THOUSANDS/ÂΜL (ref 0–0.1)
BASOPHILS NFR BLD AUTO: 1 % (ref 0–1)
BILIRUB SERPL-MCNC: 0.46 MG/DL (ref 0.2–1)
BILIRUB UR QL STRIP: NEGATIVE
BUN SERPL-MCNC: 10 MG/DL (ref 5–25)
CALCIUM SERPL-MCNC: 9.3 MG/DL (ref 8.4–10.2)
CHLORIDE SERPL-SCNC: 102 MMOL/L (ref 96–108)
CHOLEST SERPL-MCNC: 174 MG/DL
CLARITY UR: ABNORMAL
CO2 SERPL-SCNC: 28 MMOL/L (ref 21–32)
COLOR UR: ABNORMAL
CREAT SERPL-MCNC: 0.45 MG/DL (ref 0.6–1.3)
EOSINOPHIL # BLD AUTO: 0.18 THOUSAND/ÂΜL (ref 0–0.61)
EOSINOPHIL NFR BLD AUTO: 3 % (ref 0–6)
ERYTHROCYTE [DISTWIDTH] IN BLOOD BY AUTOMATED COUNT: 13.1 % (ref 11.6–15.1)
EST. AVERAGE GLUCOSE BLD GHB EST-MCNC: 174 MG/DL
FERRITIN SERPL-MCNC: 125 NG/ML (ref 11–307)
GFR SERPL CREATININE-BSD FRML MDRD: 116 ML/MIN/1.73SQ M
GLUCOSE P FAST SERPL-MCNC: 178 MG/DL (ref 65–99)
GLUCOSE UR STRIP-MCNC: ABNORMAL MG/DL
HBA1C MFR BLD: 7.7 %
HCT VFR BLD AUTO: 40 % (ref 34.8–46.1)
HDLC SERPL-MCNC: 38 MG/DL
HGB BLD-MCNC: 13.2 G/DL (ref 11.5–15.4)
HGB UR QL STRIP.AUTO: NEGATIVE
HYALINE CASTS #/AREA URNS LPF: ABNORMAL /LPF
IMM GRANULOCYTES # BLD AUTO: 0.05 THOUSAND/UL (ref 0–0.2)
IMM GRANULOCYTES NFR BLD AUTO: 1 % (ref 0–2)
IRON SATN MFR SERPL: 23 % (ref 15–50)
IRON SERPL-MCNC: 68 UG/DL (ref 50–212)
KETONES UR STRIP-MCNC: NEGATIVE MG/DL
LDLC SERPL CALC-MCNC: 82 MG/DL (ref 0–100)
LEUKOCYTE ESTERASE UR QL STRIP: ABNORMAL
LYMPHOCYTES # BLD AUTO: 2.91 THOUSANDS/ÂΜL (ref 0.6–4.47)
LYMPHOCYTES NFR BLD AUTO: 42 % (ref 14–44)
MCH RBC QN AUTO: 28.3 PG (ref 26.8–34.3)
MCHC RBC AUTO-ENTMCNC: 33 G/DL (ref 31.4–37.4)
MCV RBC AUTO: 86 FL (ref 82–98)
MONOCYTES # BLD AUTO: 0.51 THOUSAND/ÂΜL (ref 0.17–1.22)
MONOCYTES NFR BLD AUTO: 7 % (ref 4–12)
MUCOUS THREADS UR QL AUTO: ABNORMAL
NEUTROPHILS # BLD AUTO: 3.3 THOUSANDS/ÂΜL (ref 1.85–7.62)
NEUTS SEG NFR BLD AUTO: 46 % (ref 43–75)
NITRITE UR QL STRIP: NEGATIVE
NON-SQ EPI CELLS URNS QL MICRO: ABNORMAL /HPF
NRBC BLD AUTO-RTO: 0 /100 WBCS
PH UR STRIP.AUTO: 5.5 [PH]
PLATELET # BLD AUTO: 236 THOUSANDS/UL (ref 149–390)
PMV BLD AUTO: 10.8 FL (ref 8.9–12.7)
POTASSIUM SERPL-SCNC: 4.1 MMOL/L (ref 3.5–5.3)
PROT SERPL-MCNC: 6.7 G/DL (ref 6.4–8.4)
PROT UR STRIP-MCNC: NEGATIVE MG/DL
RBC # BLD AUTO: 4.66 MILLION/UL (ref 3.81–5.12)
RBC #/AREA URNS AUTO: ABNORMAL /HPF
SODIUM SERPL-SCNC: 137 MMOL/L (ref 135–147)
SP GR UR STRIP.AUTO: 1.02 (ref 1–1.03)
TIBC SERPL-MCNC: 302 UG/DL (ref 250–450)
TRIGL SERPL-MCNC: 271 MG/DL
TSH SERPL DL<=0.05 MIU/L-ACNC: 0.9 UIU/ML (ref 0.45–4.5)
UIBC SERPL-MCNC: 234 UG/DL (ref 155–355)
URATE CRY URNS QL MICRO: ABNORMAL /HPF
UROBILINOGEN UR STRIP-ACNC: <2 MG/DL
WBC # BLD AUTO: 6.99 THOUSAND/UL (ref 4.31–10.16)
WBC #/AREA URNS AUTO: ABNORMAL /HPF

## 2024-07-12 PROCEDURE — 84443 ASSAY THYROID STIM HORMONE: CPT

## 2024-07-12 PROCEDURE — 80061 LIPID PANEL: CPT

## 2024-07-12 PROCEDURE — 82728 ASSAY OF FERRITIN: CPT

## 2024-07-12 PROCEDURE — 82306 VITAMIN D 25 HYDROXY: CPT

## 2024-07-12 PROCEDURE — 83540 ASSAY OF IRON: CPT

## 2024-07-12 PROCEDURE — 36415 COLL VENOUS BLD VENIPUNCTURE: CPT

## 2024-07-12 PROCEDURE — 80053 COMPREHEN METABOLIC PANEL: CPT

## 2024-07-12 PROCEDURE — 99204 OFFICE O/P NEW MOD 45 MIN: CPT | Performed by: INTERNAL MEDICINE

## 2024-07-12 PROCEDURE — 83036 HEMOGLOBIN GLYCOSYLATED A1C: CPT

## 2024-07-12 PROCEDURE — 85025 COMPLETE CBC W/AUTO DIFF WBC: CPT

## 2024-07-12 PROCEDURE — 86803 HEPATITIS C AB TEST: CPT

## 2024-07-12 PROCEDURE — 83550 IRON BINDING TEST: CPT

## 2024-07-12 PROCEDURE — 81001 URINALYSIS AUTO W/SCOPE: CPT

## 2024-07-12 NOTE — PROGRESS NOTES
[facility]      Chief Complaint: Constipation and diarrhea    HPI:  Diamond Banegas is a 49 y.o.  female who presents with a long history of GI issues with alternating constipation and diarrhea.  She is now diarrhea predominant.  She has intermittent bright red blood per rectum when she undergoes constipation episodes.  She does have a history of polyps according to her.  Last colonoscopy was elsewhere 10 years ago.  Patient has occasional incontinence of feces.  She has gained significant weight.  She has a lot of orthopedic issues with her ankles.  She has been on disability in the past.  She is just reestablishing with physicians at this point.  No upper GI symptomatology at this point.  No other additional exacerbating or remitting factors for her lower GI symptomatology.  She had been treated with oral iron in the past.  No other complaints at the present time.      Review of Systems:   Constitutional: No fever or chills, feels well, no tiredness, recent weight gain  HENT: No complaints of earache, no hearing loss, no nosebleeds, no nasal discharge, no sore throat, no hoarseness.    Eyes: No complaints of eye pain, no red eyes, no discharge from eyes, no itchy eyes.  Cardiovascular: No complaints of slow heart rate, no fast heart rate, no chest pain, no palpitations, no leg claudication, no lower extremity edema.   Respiratory: No complaints of shortness of breath, no wheezing, no cough, no SOB on exertion, no orthopnea.   Gastrointestinal: As noted in HPI  Genitourinary: No complaints of dysuria, no incontinence, no hesitancy, no nocturia.  Positive stress incontinence  Musculoskeletal: As per HPI  Neurological: No complaints of headache, no confusion, no convulsions, no numbness or tingling, no dizziness or fainting, no limb weakness, no difficulty walking.    Skin: No complaints of skin rash or skin lesions, no itching, no skin wound, no dry skin.    Hematological/Lymphatic: No  complaints of swollen glands, does not bleed easy.   Allergic/Immunologic: No immunocompromised state.  Endocrine:  No complaints of polyuria, no polydipsia.   Psychiatric/Behavioral: is not suicidal, no sleep disturbances, no anxiety or depression, no change in personality, no emotional problems.       Historical Information   Past Medical History:   Diagnosis Date    Anemia     Arthritis     Bronchitis     Chronic pain disorder     L ankle    History of transfusion     no adverse reactions- 04/01/2019 last one    Hypertension     being monitored by PCP     Pneumonia     jan/feb 2019    Wears glasses      Past Surgical History:   Procedure Laterality Date    ANKLE ARTHROSCOPY      had 4 ankle surgeries on L foot; 2010; Oct 2017; Feb 2018    ANKLE LIGAMENT RECONSTRUCTION Right     BREAST BIOPSY Left 2016    clip    CHOLECYSTECTOMY      COLONOSCOPY      DILATION AND CURETTAGE OF UTERUS WITH HYSTEROSOCPY N/A 4/15/2019    Procedure: D&C, HYSTEROSCOPY;  Surgeon: Luis Antonio Vo MD;  [facility];  Service: Gynecology    HEEL SPUR EXCISION Right 2007    [facility] HYSTEROSCOPY ENDOMETRIAL ABLATION N/A 4/15/2019    Procedure: NOVASURE ABLATION;  Surgeon: Luis Antonio Vo MD;  [facility];  Service: Gynecology    TUBAL LIGATION       Social History   Social History     Substance and Sexual Activity   Alcohol Use Yes    Alcohol/week: 2.0 standard drinks of alcohol    Types: 1 Glasses of wine, 1 Shots of liquor per week    Comment: weekends     Social History     Substance and Sexual Activity   Drug Use No     Social History     Tobacco Use   Smoking Status Every Day    Current packs/day: 0.50    Average packs/day: 0.5 packs/day for 15.0 years (7.5 ttl pk-yrs)    Types: Cigarettes    Start date: 7/2/2009   Smokeless Tobacco Never     Family History   Problem Relation Age of Onset    Cancer Father     Breast cancer Paternal Grandmother 44    Breast cancer Paternal Aunt 50    Breast cancer Cousin 26  "        Current Medications: has a current medication list which includes the following prescription(s): losartan and meloxicam.        Vital Signs: /100   Pulse 68   Ht 5' 4\" (1.626 m)   Wt 116 kg (255 lb)   SpO2 98%   BMI 43.77 kg/m²       Physical Exam:   Constitutional  General Appearance: No acute distress, well appearing and well nourished  Head  Normocephalic  Eyes  Conjunctivae and lids: No swelling, erythema, or discharge.    Pupils and irises: Equal, round and reactive to light.   Ears, Nose, Mouth, and Throat  External inspection of ears and nose: Normal  Nasal mucosa, septum and turbinates: Normal without edema or erythema/   Oropharynx: Normal with no erythema, edema, exudate or lesions.   Neck  Normal range of motion. Neck supple.   Cardiovascular  Auscultation of the heart: Normal rate and rhythm, normal S1 and S2 without murmurs.  Examination of the extremities for edema and/or varicosities: Normal  Pulmonary/Chest  Respiratory effort: No increased work of breathing or signs of respiratory distress.   Auscultation of lungs: Clear to auscultation, equal breath sounds bilaterally, no wheezes, rales, no rhonchi.   Abdomen  Abdomen: Non-tender, no masses.   Liver and spleen: No hepatomegaly or splenomegaly.   Musculoskeletal  Gait and station: normal.  Digits and Nails: normal without clubbing or cyanosis.  Inspection/palpation of joints, bones, and muscles: Normal  Neurological  No nystagmus or asterixis.   Skin  Skin and subcutaneous tissue: Normal without rashes or lesions.   Lymphatic  Palpation of the lymph nodes in neck: No lymphadenopathy.   Psychiatric  Orientation to person, place and time: Normal.  Mood and affect: Normal.         Labs:  Lab Results   Component Value Date    ALT 39 12/17/2022    AST 17 12/17/2022    BUN 12 01/01/2024    CALCIUM 10.2 01/01/2024    CL 99 01/01/2024    CO2 23 01/01/2024    CREATININE 0.56 (L) 01/01/2024    HCT 43.0 01/01/2024    HGB 14.6 01/01/2024    " MG 1.8 12/20/2014     01/01/2024    K 3.8 01/01/2024     12/20/2014    WBC 15.93 (H) 01/01/2024         X-Rays & Procedures:   No orders to display           ______________________________________________________________________      Assessment & Plan:     Diagnoses and all orders for this visit:    Alternating constipation and diarrhea  -     Colonoscopy; Future    Rectal bleeding  -     Colonoscopy; Future    History of colon polyps  -     Colonoscopy; Future    Full incontinence of feces  -     Colonoscopy; Future      Patient will undergo colonoscopy.  Further recommendations will depend on study results

## 2024-07-12 NOTE — H&P (VIEW-ONLY)
[facility]      Chief Complaint: Constipation and diarrhea    HPI:  Diamond Banegas is a 49 y.o.  female who presents with a long history of GI issues with alternating constipation and diarrhea.  She is now diarrhea predominant.  She has intermittent bright red blood per rectum when she undergoes constipation episodes.  She does have a history of polyps according to her.  Last colonoscopy was elsewhere 10 years ago.  Patient has occasional incontinence of feces.  She has gained significant weight.  She has a lot of orthopedic issues with her ankles.  She has been on disability in the past.  She is just reestablishing with physicians at this point.  No upper GI symptomatology at this point.  No other additional exacerbating or remitting factors for her lower GI symptomatology.  She had been treated with oral iron in the past.  No other complaints at the present time.      Review of Systems:   Constitutional: No fever or chills, feels well, no tiredness, recent weight gain  HENT: No complaints of earache, no hearing loss, no nosebleeds, no nasal discharge, no sore throat, no hoarseness.    Eyes: No complaints of eye pain, no red eyes, no discharge from eyes, no itchy eyes.  Cardiovascular: No complaints of slow heart rate, no fast heart rate, no chest pain, no palpitations, no leg claudication, no lower extremity edema.   Respiratory: No complaints of shortness of breath, no wheezing, no cough, no SOB on exertion, no orthopnea.   Gastrointestinal: As noted in HPI  Genitourinary: No complaints of dysuria, no incontinence, no hesitancy, no nocturia.  Positive stress incontinence  Musculoskeletal: As per HPI  Neurological: No complaints of headache, no confusion, no convulsions, no numbness or tingling, no dizziness or fainting, no limb weakness, no difficulty walking.    Skin: No complaints of skin rash or skin lesions, no itching, no skin wound, no dry skin.    Hematological/Lymphatic: No  complaints of swollen glands, does not bleed easy.   Allergic/Immunologic: No immunocompromised state.  Endocrine:  No complaints of polyuria, no polydipsia.   Psychiatric/Behavioral: is not suicidal, no sleep disturbances, no anxiety or depression, no change in personality, no emotional problems.       Historical Information   Past Medical History:   Diagnosis Date    Anemia     Arthritis     Bronchitis     Chronic pain disorder     L ankle    History of transfusion     no adverse reactions- 04/01/2019 last one    Hypertension     being monitored by PCP     Pneumonia     jan/feb 2019    Wears glasses      Past Surgical History:   Procedure Laterality Date    ANKLE ARTHROSCOPY      had 4 ankle surgeries on L foot; 2010; Oct 2017; Feb 2018    ANKLE LIGAMENT RECONSTRUCTION Right     BREAST BIOPSY Left 2016    clip    CHOLECYSTECTOMY      COLONOSCOPY      DILATION AND CURETTAGE OF UTERUS WITH HYSTEROSOCPY N/A 4/15/2019    Procedure: D&C, HYSTEROSCOPY;  Surgeon: Luis Antonio Vo MD;  [facility];  Service: Gynecology    HEEL SPUR EXCISION Right 2007    [facility] HYSTEROSCOPY ENDOMETRIAL ABLATION N/A 4/15/2019    Procedure: NOVASURE ABLATION;  Surgeon: Luis Antonio Vo MD;  [facility];  Service: Gynecology    TUBAL LIGATION       Social History   Social History     Substance and Sexual Activity   Alcohol Use Yes    Alcohol/week: 2.0 standard drinks of alcohol    Types: 1 Glasses of wine, 1 Shots of liquor per week    Comment: weekends     Social History     Substance and Sexual Activity   Drug Use No     Social History     Tobacco Use   Smoking Status Every Day    Current packs/day: 0.50    Average packs/day: 0.5 packs/day for 15.0 years (7.5 ttl pk-yrs)    Types: Cigarettes    Start date: 7/2/2009   Smokeless Tobacco Never     Family History   Problem Relation Age of Onset    Cancer Father     Breast cancer Paternal Grandmother 44    Breast cancer Paternal Aunt 50    Breast cancer Cousin 26  "        Current Medications: has a current medication list which includes the following prescription(s): losartan and meloxicam.        Vital Signs: /100   Pulse 68   Ht 5' 4\" (1.626 m)   Wt 116 kg (255 lb)   SpO2 98%   BMI 43.77 kg/m²       Physical Exam:   Constitutional  General Appearance: No acute distress, well appearing and well nourished  Head  Normocephalic  Eyes  Conjunctivae and lids: No swelling, erythema, or discharge.    Pupils and irises: Equal, round and reactive to light.   Ears, Nose, Mouth, and Throat  External inspection of ears and nose: Normal  Nasal mucosa, septum and turbinates: Normal without edema or erythema/   Oropharynx: Normal with no erythema, edema, exudate or lesions.   Neck  Normal range of motion. Neck supple.   Cardiovascular  Auscultation of the heart: Normal rate and rhythm, normal S1 and S2 without murmurs.  Examination of the extremities for edema and/or varicosities: Normal  Pulmonary/Chest  Respiratory effort: No increased work of breathing or signs of respiratory distress.   Auscultation of lungs: Clear to auscultation, equal breath sounds bilaterally, no wheezes, rales, no rhonchi.   Abdomen  Abdomen: Non-tender, no masses.   Liver and spleen: No hepatomegaly or splenomegaly.   Musculoskeletal  Gait and station: normal.  Digits and Nails: normal without clubbing or cyanosis.  Inspection/palpation of joints, bones, and muscles: Normal  Neurological  No nystagmus or asterixis.   Skin  Skin and subcutaneous tissue: Normal without rashes or lesions.   Lymphatic  Palpation of the lymph nodes in neck: No lymphadenopathy.   Psychiatric  Orientation to person, place and time: Normal.  Mood and affect: Normal.         Labs:  Lab Results   Component Value Date    ALT 39 12/17/2022    AST 17 12/17/2022    BUN 12 01/01/2024    CALCIUM 10.2 01/01/2024    CL 99 01/01/2024    CO2 23 01/01/2024    CREATININE 0.56 (L) 01/01/2024    HCT 43.0 01/01/2024    HGB 14.6 01/01/2024    " MG 1.8 12/20/2014     01/01/2024    K 3.8 01/01/2024     12/20/2014    WBC 15.93 (H) 01/01/2024         X-Rays & Procedures:   No orders to display           ______________________________________________________________________      Assessment & Plan:     Diagnoses and all orders for this visit:    Alternating constipation and diarrhea  -     Colonoscopy; Future    Rectal bleeding  -     Colonoscopy; Future    History of colon polyps  -     Colonoscopy; Future    Full incontinence of feces  -     Colonoscopy; Future      Patient will undergo colonoscopy.  Further recommendations will depend on study results

## 2024-07-12 NOTE — PATIENT INSTRUCTIONS
Scheduled date of colonoscopy (as of today): 7/20/24  Physician performing colonoscopy: Abdulaziz  Location of colonoscopy: [place]  Bowel prep reviewed with patient: Miralax  Instructions reviewed with patient by: Pat AUSTIN  Clearances:

## 2024-07-12 NOTE — LETTER
July 12, 2024     Akin Torres DO  [address] [place], [place] [id number]    Patient: Diamond Banegas   YOB: 1975   Date of Visit: 7/12/2024       Dear Dr. Torres:    Thank you for referring Diamond Banegas to me for evaluation. Below are my notes for this consultation.    If you have questions, please do not hesitate to call me. I look forward to following your patient along with you.         Sincerely,        Luis Antonio Cintron MD        CC: No Recipients    Luis Antonio Cintron MD  7/12/2024  8:37 AM  Incomplete  [facility]      Chief Complaint: Constipation and diarrhea    HPI:  Diamnod Banegas is a 49 y.o.  female who presents with a long history of GI issues with alternating constipation and diarrhea.  She is now diarrhea predominant.  She has intermittent bright red blood per rectum when she undergoes constipation episodes.  She does have a history of polyps according to her.  Last colonoscopy was elsewhere 10 years ago.  Patient has occasional incontinence of feces.  She has gained significant weight.  She has a lot of orthopedic issues with her ankles.  She has been on disability in the past.  She is just reestablishing with physicians at this point.  No upper GI symptomatology at this point.  No other additional exacerbating or remitting factors for her lower GI symptomatology.  She had been treated with oral iron in the past.  No other complaints at the present time.      Review of Systems:   Constitutional: No fever or chills, feels well, no tiredness, recent weight gain  HENT: No complaints of earache, no hearing loss, no nosebleeds, no nasal discharge, no sore throat, no hoarseness.    Eyes: No complaints of eye pain, no red eyes, no discharge from eyes, no itchy eyes.  Cardiovascular: No complaints of slow heart rate, no fast heart rate, no chest pain, no palpitations, no leg claudication, no lower extremity edema.   Respiratory: No complaints of  shortness of breath, no wheezing, no cough, no SOB on exertion, no orthopnea.   Gastrointestinal: As noted in HPI  Genitourinary: No complaints of dysuria, no incontinence, no hesitancy, no nocturia.  Positive stress incontinence  Musculoskeletal: As per HPI  Neurological: No complaints of headache, no confusion, no convulsions, no numbness or tingling, no dizziness or fainting, no limb weakness, no difficulty walking.    Skin: No complaints of skin rash or skin lesions, no itching, no skin wound, no dry skin.    Hematological/Lymphatic: No complaints of swollen glands, does not bleed easy.   Allergic/Immunologic: No immunocompromised state.  Endocrine:  No complaints of polyuria, no polydipsia.   Psychiatric/Behavioral: is not suicidal, no sleep disturbances, no anxiety or depression, no change in personality, no emotional problems.       Historical Information  Past Medical History:   Diagnosis Date   • Anemia    • Arthritis    • Bronchitis    • Chronic pain disorder     L ankle   • History of transfusion     no adverse reactions- 04/01/2019 last one   • Hypertension     being monitored by PCP    • Pneumonia     jan/feb 2019   • Wears glasses      Past Surgical History:   Procedure Laterality Date   • ANKLE ARTHROSCOPY      had 4 ankle surgeries on L foot; 2010; Oct 2017; Feb 2018   • ANKLE LIGAMENT RECONSTRUCTION Right    • BREAST BIOPSY Left 2016    clip   • CHOLECYSTECTOMY     • COLONOSCOPY     • DILATION AND CURETTAGE OF UTERUS WITH HYSTEROSOCPY N/A 4/15/2019    Procedure: D&C, HYSTEROSCOPY;  Surgeon: Luis Antonio Vo MD;  Location: [facility];  Service: Gynecology   • HEEL SPUR EXCISION Right 2007   • CT HYSTEROSCOPY ENDOMETRIAL ABLATION N/A 4/15/2019    Procedure: NOVASURE ABLATION;  Surgeon: Luis Antonio Vo MD;  Location: [facility];  Service: Gynecology   • TUBAL LIGATION       Social History  Social History     Substance and Sexual Activity   Alcohol Use Yes   • Alcohol/week: 2.0 standard  "drinks of alcohol   • Types: 1 Glasses of wine, 1 Shots of liquor per week    Comment: weekends     Social History     Substance and Sexual Activity   Drug Use No     Social History     Tobacco Use   Smoking Status Every Day   • Current packs/day: 0.50   • Average packs/day: 0.5 packs/day for 15.0 years (7.5 ttl pk-yrs)   • Types: Cigarettes   • Start date: 7/2/2009   Smokeless Tobacco Never     Family History   Problem Relation Age of Onset   • Cancer Father    • Breast cancer Paternal Grandmother 44   • Breast cancer Paternal Aunt 50   • Breast cancer Cousin 26         Current Medications: has a current medication list which includes the following prescription(s): losartan and meloxicam.        Vital Signs: /100   Pulse 68   Ht 5' 4\" (1.626 m)   Wt 116 kg (255 lb)   SpO2 98%   BMI 43.77 kg/m²       Physical Exam:   Constitutional  General Appearance: No acute distress, well appearing and well nourished  Head  Normocephalic  Eyes  Conjunctivae and lids: No swelling, erythema, or discharge.    Pupils and irises: Equal, round and reactive to light.   Ears, Nose, Mouth, and Throat  External inspection of ears and nose: Normal  Nasal mucosa, septum and turbinates: Normal without edema or erythema/   Oropharynx: Normal with no erythema, edema, exudate or lesions.   Neck  Normal range of motion. Neck supple.   Cardiovascular  Auscultation of the heart: Normal rate and rhythm, normal S1 and S2 without murmurs.  Examination of the extremities for edema and/or varicosities: Normal  Pulmonary/Chest  Respiratory effort: No increased work of breathing or signs of respiratory distress.   Auscultation of lungs: Clear to auscultation, equal breath sounds bilaterally, no wheezes, rales, no rhonchi.   Abdomen  Abdomen: Non-tender, no masses.   Liver and spleen: No hepatomegaly or splenomegaly.   Musculoskeletal  Gait and station: normal.  Digits and Nails: normal without clubbing or cyanosis.  Inspection/palpation of " joints, bones, and muscles: Normal  Neurological  No nystagmus or asterixis.   Skin  Skin and subcutaneous tissue: Normal without rashes or lesions.   Lymphatic  Palpation of the lymph nodes in neck: No lymphadenopathy.   Psychiatric  Orientation to person, place and time: Normal.  Mood and affect: Normal.         Labs:  Lab Results   Component Value Date    ALT 39 12/17/2022    AST 17 12/17/2022    BUN 12 01/01/2024    CALCIUM 10.2 01/01/2024    CL 99 01/01/2024    CO2 23 01/01/2024    CREATININE 0.56 (L) 01/01/2024    HCT 43.0 01/01/2024    HGB 14.6 01/01/2024    MG 1.8 12/20/2014     01/01/2024    K 3.8 01/01/2024     12/20/2014    WBC 15.93 (H) 01/01/2024         X-Rays & Procedures:   No orders to display           ______________________________________________________________________      Assessment & Plan:     Diagnoses and all orders for this visit:    Alternating constipation and diarrhea  -     Colonoscopy; Future    Rectal bleeding  -     Colonoscopy; Future    History of colon polyps  -     Colonoscopy; Future    Full incontinence of feces  -     Colonoscopy; Future      Patient will undergo colonoscopy.  Further recommendations will depend on study results

## 2024-07-13 LAB — HCV AB SER QL: NORMAL

## 2024-07-15 ENCOUNTER — OFFICE VISIT (OUTPATIENT)
Dept: OBGYN CLINIC | Facility: CLINIC | Age: 49
End: 2024-07-15
Payer: COMMERCIAL

## 2024-07-15 VITALS
HEART RATE: 75 BPM | SYSTOLIC BLOOD PRESSURE: 135 MMHG | DIASTOLIC BLOOD PRESSURE: 80 MMHG | WEIGHT: 255 LBS | HEIGHT: 64 IN | BODY MASS INDEX: 43.54 KG/M2

## 2024-07-15 DIAGNOSIS — S83.241D TEAR OF MEDIAL MENISCUS OF RIGHT KNEE, UNSPECIFIED TEAR TYPE, UNSPECIFIED WHETHER OLD OR CURRENT TEAR, SUBSEQUENT ENCOUNTER: Primary | ICD-10-CM

## 2024-07-15 DIAGNOSIS — M95.8 OSTEOCHONDRAL DEFECT OF ANKLE: ICD-10-CM

## 2024-07-15 PROCEDURE — 99214 OFFICE O/P EST MOD 30 MIN: CPT | Performed by: STUDENT IN AN ORGANIZED HEALTH CARE EDUCATION/TRAINING PROGRAM

## 2024-07-15 NOTE — PROGRESS NOTES
ASSESSMENT/PLAN:    Diagnoses and all orders for this visit:    Tear of medial meniscus of right knee, unspecified tear type, unspecified whether old or current tear, subsequent encounter      I reviewed the history, exam, and imaging with the patient in clinic today.  I did review the patient's x-rays and MRI again with her today.  I did discuss that the MRI shows a medial meniscus posterior root tear with medial extrusion.  There is some evidence of chondrosis most notably in the medial compartment.  However, the x-rays do not show significant degenerative changes in the knee.  Discussed the role of the meniscus in the knee and the impact of a root tear on the knee.  I discussed that a root tear essentially makes the meniscus nonfunctional which will likely accelerate degenerative changes in the knee.  I discussed that the root tear is most likely contributing to the posterior medial knee pain that she has.  I discussed potential treatment options with the patient focusing on both conservative and surgical management.  I discussed what surgical intervention would entail scopic repair of the root tear.  I also discussed the postoperative rehab protocol and did emphasize that it would require 6 weeks of nonweightbearing followed by 6 weeks of weightbearing locked straight in a brace.  I also discussed that literature does suggest that patients with a BMI over 40 less successful repairs.  I did discuss that the MRI does show some evidence of degenerative changes in the knee though the x-rays significant degenerative changes.  I discussed that the alternative to surgery would be conservative management including physical therapy, medications, and injections.  I discussed the impact root tear would have on the knee including likely accelerated degeneration of the medial compartment.  I discussed that it is unclear how fast the degeneration would occur but it would likely be at a faster rate if the group after long  discussion with the patient about the risk, benefits, and alternatives to surgery, patient opted for surgical intervention right knee arthroscopy with medial meniscus posterior root tear.  Did discuss with the patient that she would need preoperative labs and clearance prior to surgery.  The patient did have a recent A1c of 7.7.  She is seeing her primary care physician to discuss these results and starting medications help control her blood glucose.  I discussed with the patient that I would prefer to have her start this medications soon in order to get her A1c lower before surgery.  Patient was amenable to this plan.  I discussed that I would like to recheck her fasting glucose and A1c proximately 1 month and if they are trending downward I would be reasonable to go forward with surgery.  I did discuss that delaying the surgery too long could result in significant degenerative changes to the medial compartment. The patient demonstrated understanding of the discussion and was in agreement with the plan.  All of the questions were answered.  Patient can reach out to clinic with any questions or concerns at any time.    I explained that this injury should be treated urgently with surgical repair due to very poor functional outcomes with non-operative management.  I explained what will be involved with the surgery and recovery.  I explained it will be an outpatient procedure.  The patient will come and go home the same day. The patient will get a nerve block and catheter to help with the pain and go under anesthesia for the procedure.  An open incision centered over the knee is made to expose the tear.  The end of the tendon and the bony surface are cleaned off to optimize healing.  Sutures are used to reattach the tendon thru drill holes or anchors.  I place the knee in a hinged knee brace locked in extension after surgery.  The patient goes home non weight-bearing using crutches while keeping the knee locked  straight at full extension in the brace. The patient should start on a 325 mg ECASA twice a day to minimize risk of a blood clot on the day after surgery.  I will see the patient back about two weeks after surgery, X-ray the knee, and check the wounds.  The patient will keep the knee locked out in the brace except for physical therapy exercises and can start PT and TTWB in the brace while continuing anticoagulation.  At 6 weeks after surgery, I see the patient back in the office to check progress and usually advance to weight bearing as tolerated and start to adjust the brace to allow slowly progressive flexion at that time.  The patient can typically start to wean out of the brace based on range of motion and quad control and is usually out of the brace by 10-12 weeks.  Therapy progresses range of motion, strengthening and functional activities slowly over at least 5-6 months.  The patient may need up to 6-8 months of therapy and rehab to optimize functional outcome.  I discussed the risks and benefits of the surgery, including but not limited to the risk of neurovascular injury, the risk of anesthesia, the risk of DVT/PE, the risk of infection, the risk of knee stiffness, the risk for re-tear especially in the early postop period, the risk of delayed or limited functional recovery even with a good structural outcome, the risk of persistent knee pain or swelling and the elevated risk for osteoarthritis over the long run even with a good outcome.  The patient appears to understand the injury, indications for surgery, risks and benefits and what is involved in the recovery and wishes to proceed.  I explained that while the surgery is not emergent, we should schedule surgery as soon as possible as the tendon becomes more difficult to repair over time.  The patient had all questions answered and appears comfortable with this plan going forward.      _____________________________________________________  CHIEF  COMPLAINT:  Chief Complaint   Patient presents with    Right Knee - Follow-up, Pain         SUBJECTIVE:  Diamond Banegas is a 49 y.o. female who presents to our office to review MRI results of her right knee.  She still complains of right knee pain, which is occasionally worse along the posterior aspect of her knee.  She is ambulating with a cane.  She denies any fever or chills.    The following portions of the patient's history were reviewed and updated as appropriate: allergies, current medications, past family history, past medical history, past social history, past surgical history and problem list.    PAST MEDICAL HISTORY:  Past Medical History:   Diagnosis Date    Anemia     Arthritis     Bronchitis     Chronic pain disorder     L ankle    History of transfusion     no adverse reactions- 04/01/2019 last one    Hypertension     being monitored by PCP     Pneumonia     jan/feb 2019    Wears glasses        PAST SURGICAL HISTORY:  Past Surgical History:   Procedure Laterality Date    ANKLE ARTHROSCOPY      had 4 ankle surgeries on L foot; 2010; Oct 2017; Feb 2018    ANKLE LIGAMENT RECONSTRUCTION Right     BREAST BIOPSY Left 2016    clip    CHOLECYSTECTOMY      COLONOSCOPY      DILATION AND CURETTAGE OF UTERUS WITH HYSTEROSOCPY N/A 4/15/2019    Procedure: D&C, HYSTEROSCOPY;  Surgeon: Luis Antonio Vo MD;  Location: [facility];  Service: Gynecology    HEEL SPUR EXCISION Right 2007    IL HYSTEROSCOPY ENDOMETRIAL ABLATION N/A 4/15/2019    Procedure: NOVASURE ABLATION;  Surgeon: Luis Antonio Vo MD;  Location[facility];  Service: Gynecology    TUBAL LIGATION         FAMILY HISTORY:  Family History   Problem Relation Age of Onset    Cancer Father     Breast cancer Paternal Grandmother 44    Breast cancer Paternal Aunt 50    Breast cancer Cousin 26       SOCIAL HISTORY:  Social History     Tobacco Use    Smoking status: Every Day     Current packs/day: 0.50     Average packs/day: 0.5 packs/day for 15.0 years  "(7.5 ttl pk-yrs)     Types: Cigarettes     Start date: 7/2/2009    Smokeless tobacco: Never   Vaping Use    Vaping status: Never Used   Substance Use Topics    Alcohol use: Yes     Alcohol/week: 2.0 standard drinks of alcohol     Types: 1 Glasses of wine, 1 Shots of liquor per week     Comment: weekends    Drug use: No       MEDICATIONS:    Current Outpatient Medications:     losartan (COZAAR) 25 mg tablet, Take 1 tablet (25 mg total) by mouth daily, Disp: 30 tablet, Rfl: 0    meloxicam (Mobic) 15 mg tablet, Take 1 tablet (15 mg total) by mouth daily for 28 days, Disp: 28 tablet, Rfl: 0    ALLERGIES:  No Known Allergies    ROS:  Review of Systems     Constitutional: Negative for fatigue, fever or loss of appetite.   HENT: Negative.    Respiratory: Negative for shortness of breath, dyspnea.    Cardiovascular: Negative for chest pain/tightness.   Gastrointestinal: Negative for abdominal pain, N/V.   Endocrine: Negative for cold/heat intolerance, unexplained weight loss/gain.   Genitourinary: Negative for flank pain, dysuria, hematuria.   Musculoskeletal: Positive for arthralgia   Skin: Negative for rash.    Neurological: Negative for numbness or tingling  Psychiatric/Behavioral: Negative for agitation.  _____________________________________________________  PHYSICAL EXAMINATION:    Blood pressure 135/80, pulse 75, height 5' 4\" (1.626 m), weight 116 kg (255 lb), not currently breastfeeding.    Constitutional: Oriented to person, place, and time. Appears well-developed and well-nourished. No distress.   HENT:   Head: Normocephalic.   Eyes: Conjunctivae are normal. Right eye exhibits no discharge. Left eye exhibits no discharge. No scleral icterus.   Cardiovascular: Normal rate.    Pulmonary/Chest: Effort normal.   Neurological: Alert and oriented to person, place, and time.   Skin: Skin is warm and dry. No rash noted. Not diaphoretic. No erythema. No pallor.   Psychiatric: Normal mood and affect. Behavior is normal. " Judgment and thought content normal.      MUSCULOSKELETAL EXAMINATION:    Focused right knee exam:  Ambulates with an antalgic gait.     Skin is intact. No evidence of ecchymosis, erythema, gross deformity or previous surgical incisions. moderate effusion.       Palpation of the knee demonstrates no tenderness over the medial patellar facet, lateral patellar facet, tibial tubercle or patellar tendon.  There is no tenderness over the pes anserine bursa.  There is no tenderness over Gerdy's tubercle. There is no tenderness in the popliteal fossa.  There is no tenderness over the medial or lateral epicondyle.  There is no tenderness with palpation over the posterior calf without palpable cords.     Range of motion testing demonstrates that the patient is able to achieve +10 degrees of extension and 80 degrees of flexion. There is no pain with hyperflexion or hyperextension.  There is negative patellar crepitus. The patient is able to do a straight leg raise.       Strength testing demonstrates 5/5 strength with hip flexion, 5/5 knee extension, 5/5 knee flexion, and 5/5 dorsiflexion and plantarflexion.     Provocation testing demonstrates  Mensicus- positive medial joint line tenderness, negative lateral joint line tenderness, negative Isabelle's, negative flexion compression.  Collateral ligaments- negative varus laxity at full extension and in 30° of knee flexion, negative valgus laxity at full extension and in 30° of knee flexion.  Cruciate ligament- 1A Lachman examination, negative pivot shift test, 1A anterior drawer, 1A posterior drawer, negative posterior sag.  Patella- negative apprehension with lateral patellar translation, negative apprehension with medial patellar translation, negative J-sign, negative patellar grind test, negative tight lateral patellar tilt.     Range of motion testing of the hip and ankle are within normal limits.     No calf tenderness to palpation bilaterally     LE NV Exam: +2 DP/PT  pulses bilaterally  Sensation intact to light touch L2-S1 bilaterally, SPN/DPN/TA motor intact        Knee Imaging     X-rays of the right knee and hip were obtained on 6/16/24 and 6/17/24 and reviewed with the patient. Per my independent review, the imaging shows no acute osseous abnormality.  No significant degenerative changes noted in any of the compartments.    MRI of the right knee was obtained on 6/27/2024 and reviewed with the patient.  Per my independent review, image does show a radial tear of the posterior root of the medial meniscus with mild medial extrusion.  There is tricompartmental, porosis noted no significant compartment.  Meniscus shows no evidence of tear.  ACL and PCL appear intact.

## 2024-07-16 ENCOUNTER — PREP FOR PROCEDURE (OUTPATIENT)
Age: 49
End: 2024-07-16

## 2024-07-16 ENCOUNTER — OFFICE VISIT (OUTPATIENT)
Dept: FAMILY MEDICINE CLINIC | Facility: CLINIC | Age: 49
End: 2024-07-16
Payer: COMMERCIAL

## 2024-07-16 VITALS
WEIGHT: 251.6 LBS | SYSTOLIC BLOOD PRESSURE: 144 MMHG | HEIGHT: 64 IN | DIASTOLIC BLOOD PRESSURE: 84 MMHG | RESPIRATION RATE: 20 BRPM | OXYGEN SATURATION: 97 % | HEART RATE: 84 BPM | TEMPERATURE: 96.8 F | BODY MASS INDEX: 42.95 KG/M2

## 2024-07-16 DIAGNOSIS — E55.9 VITAMIN D DEFICIENCY: ICD-10-CM

## 2024-07-16 DIAGNOSIS — Z00.00 ENCOUNTER FOR WELL ADULT EXAM WITHOUT ABNORMAL FINDINGS: Primary | ICD-10-CM

## 2024-07-16 DIAGNOSIS — E66.01 MORBID OBESITY (HCC): ICD-10-CM

## 2024-07-16 DIAGNOSIS — Z59.41 FOOD INSECURITY: ICD-10-CM

## 2024-07-16 DIAGNOSIS — E11.9 TYPE 2 DIABETES MELLITUS WITHOUT COMPLICATION, WITHOUT LONG-TERM CURRENT USE OF INSULIN (HCC): ICD-10-CM

## 2024-07-16 PROCEDURE — 99214 OFFICE O/P EST MOD 30 MIN: CPT | Performed by: NURSE PRACTITIONER

## 2024-07-16 PROCEDURE — 99396 PREV VISIT EST AGE 40-64: CPT | Performed by: NURSE PRACTITIONER

## 2024-07-16 RX ORDER — ERGOCALCIFEROL 1.25 MG/1
50000 CAPSULE ORAL WEEKLY
Qty: 12 CAPSULE | Refills: 0 | Status: SHIPPED | OUTPATIENT
Start: 2024-07-16

## 2024-07-16 RX ORDER — BLOOD SUGAR DIAGNOSTIC
STRIP MISCELLANEOUS
Qty: 200 EACH | Refills: 3 | Status: SHIPPED | OUTPATIENT
Start: 2024-07-16

## 2024-07-16 RX ORDER — LANCETS 33 GAUGE
EACH MISCELLANEOUS
Qty: 200 EACH | Refills: 3 | Status: SHIPPED | OUTPATIENT
Start: 2024-07-16

## 2024-07-16 RX ORDER — BLOOD-GLUCOSE METER
KIT MISCELLANEOUS
Qty: 1 KIT | Refills: 0 | Status: SHIPPED | OUTPATIENT
Start: 2024-07-16

## 2024-07-16 SDOH — ECONOMIC STABILITY - FOOD INSECURITY: FOOD INSECURITY: Z59.41

## 2024-07-16 NOTE — PROGRESS NOTES
Adult Annual Physical  Name: Diamond Banegas      : 1975      MRN: [id number]  Encounter Provider: SANTOS Davis  Encounter Date: 2024   Encounter department: [facility]    Assessment & Plan   1. Encounter for well adult exam without abnormal findings  2. Type 2 diabetes mellitus without complication, without long-term current use of insulin (HCC)  Assessment & Plan:  This is a new onset, 7.7.  Will start metformin twice daily glucometer provided.  Diabetic educator offered.  Will need close follow-up, diary provided.  Lab Results   Component Value Date    HGBA1C 7.7 (H) 2024     Orders:  -     Blood Glucose Monitoring Suppl (OneTouch Verio Reflect) w/Device KIT; Check blood sugars twice daily. Please substitute with appropriate alternative as covered by patient's insurance. Dx: E11.65  -     glucose blood (OneTouch Verio) test strip; Check blood sugars twice daily. Please substitute with appropriate alternative as covered by patient's insurance. Dx: E11.65  -     OneTouch Delica Lancets 33G MISC; Check blood sugars twice daily. Please substitute with appropriate alternative as covered by patient's insurance. Dx: E11.65  -     metFORMIN (GLUCOPHAGE) 500 mg tablet; Take 1 tablet (500 mg total) by mouth 2 (two) times a day with meals  3. Vitamin D deficiency  Assessment & Plan:  Replacement of vitamin D 50,000 units weekly, level at 11.  Orders:  -     ergocalciferol (VITAMIN D2) 50,000 units; Take 1 capsule (50,000 Units total) by mouth once a week  4. Food insecurity  Assessment & Plan:  Currently on Medicaid, unable to get food stamps.  She reports her total income does not allow her to shop, obtain food.  Food pantry list provided in [facility] along with 211 number.  Referral placed for  if other availabilities  Orders:  -     Ambulatory Referral to Social Work Care Management Program; Future  5. Morbid obesity (HCC)  Assessment &  Plan:  Continue working on lifestyle modifications    Immunizations and preventive care screenings were discussed with patient today. Appropriate education was printed on patient's after visit summary.    Counseling:  Alcohol/drug use: discussed moderation in alcohol intake, the recommendations for healthy alcohol use, and avoidance of illicit drug use.  Dental Health: discussed importance of regular tooth brushing, flossing, and dental visits.  Injury prevention: discussed safety/seat belts, safety helmets, smoke detectors, carbon dioxide detectors, and smoking near bedding or upholstery.  Sexual health: discussed sexually transmitted diseases, partner selection, use of condoms, avoidance of unintended pregnancy, and contraceptive alternatives.  Exercise: the importance of regular exercise/physical activity was discussed. Recommend exercise 3-5 times per week for at least 30 minutes.       Tobacco Cessation Counseling: Tobacco cessation counseling was provided. The patient is sincerely urged to quit consumption of tobacco. She is not ready to quit tobacco. Medication options discussed.         History of Present Illness     Adult Annual Physical:  Patient presents for annual physical.     Diet and Physical Activity:  - Diet/Nutrition: poor diet.  - Exercise: no formal exercise.    General Health:  - Sleep: 7-8 hours of sleep on average.  - Hearing: normal hearing right ear and normal hearing left ear.  - Vision: wears glasses.    /GYN Health:  - Follows with GYN: no.   - History of STDs: no    Advanced Care Planning:  - Has an advanced directive?: no    - Has a durable medical POA?: no    - ACP document given to patient?: no      Review of Systems   Constitutional:  Negative for activity change, chills, fatigue and fever.   HENT:  Negative for congestion, ear discharge, ear pain, sinus pressure, sinus pain, sore throat, tinnitus and trouble swallowing.    Eyes:  Negative for photophobia, pain, discharge, itching  "and visual disturbance.   Respiratory:  Negative for cough, chest tightness, shortness of breath and wheezing.    Cardiovascular:  Negative for chest pain and leg swelling.   Gastrointestinal:  Negative for abdominal distention, abdominal pain, constipation, diarrhea, nausea and vomiting.   Endocrine: Negative for polydipsia, polyphagia and polyuria.   Genitourinary:  Negative for dysuria and frequency.   Musculoskeletal:  Positive for arthralgias and gait problem. Negative for myalgias, neck pain and neck stiffness.   Skin:  Negative for color change.   Neurological:  Negative for dizziness, syncope, weakness, numbness and headaches.   Hematological:  Does not bruise/bleed easily.   Psychiatric/Behavioral:  Negative for behavioral problems, confusion, self-injury, sleep disturbance and suicidal ideas. The patient is not nervous/anxious.          Objective     /84 (BP Location: Left arm, Patient Position: Sitting, Cuff Size: Large)   Pulse 84   Temp (!) 96.8 °F (36 °C) (Tympanic)   Resp 20   Ht 5' 4\" (1.626 m)   Wt 114 kg (251 lb 9.6 oz)   SpO2 97%   BMI 43.19 kg/m²     Physical Exam  Vitals and nursing note reviewed.   Constitutional:       Appearance: She is obese.   HENT:      Head: Normocephalic and atraumatic.      Nose: Nose normal.   Eyes:      Extraocular Movements: Extraocular movements intact.      Conjunctiva/sclera: Conjunctivae normal.      Pupils: Pupils are equal, round, and reactive to light.   Cardiovascular:      Rate and Rhythm: Normal rate and regular rhythm.      Pulses: Normal pulses.      Heart sounds: Normal heart sounds.   Pulmonary:      Effort: Pulmonary effort is normal.      Breath sounds: Normal breath sounds.   Skin:     General: Skin is warm.   Neurological:      General: No focal deficit present.      Mental Status: She is alert and oriented to person, place, and time.   Psychiatric:         Mood and Affect: Mood normal.         Behavior: Behavior normal.         Thought " Content: Thought content normal.         Judgment: Judgment normal.

## 2024-07-16 NOTE — ASSESSMENT & PLAN NOTE
This is a new onset, 7.7.  Will start metformin twice daily glucometer provided.  Diabetic educator offered.  Will need close follow-up, diary provided.  Lab Results   Component Value Date    HGBA1C 7.7 (H) 07/12/2024

## 2024-07-16 NOTE — ASSESSMENT & PLAN NOTE
Currently on Medicaid, unable to get food stamps.  She reports her total income does not allow her to shop, obtain food.  Food pantry list provided in [facility] [place] along with 211 number.  Referral placed for  if other availabilities

## 2024-07-17 ENCOUNTER — PATIENT OUTREACH (OUTPATIENT)
Dept: FAMILY MEDICINE CLINIC | Facility: CLINIC | Age: 49
End: 2024-07-17

## 2024-07-17 ENCOUNTER — OFFICE VISIT (OUTPATIENT)
Dept: OBGYN CLINIC | Facility: CLINIC | Age: 49
End: 2024-07-17
Payer: COMMERCIAL

## 2024-07-17 VITALS
WEIGHT: 251 LBS | BODY MASS INDEX: 42.85 KG/M2 | DIASTOLIC BLOOD PRESSURE: 84 MMHG | SYSTOLIC BLOOD PRESSURE: 132 MMHG | HEIGHT: 64 IN

## 2024-07-17 DIAGNOSIS — Z01.419 ENCOUNTER FOR GYNECOLOGICAL EXAMINATION WITHOUT ABNORMAL FINDING: Primary | ICD-10-CM

## 2024-07-17 DIAGNOSIS — Z12.31 SCREENING MAMMOGRAM FOR BREAST CANCER: ICD-10-CM

## 2024-07-17 DIAGNOSIS — R10.2 PELVIC PAIN: ICD-10-CM

## 2024-07-17 DIAGNOSIS — Z12.4 SCREENING FOR CERVICAL CANCER: ICD-10-CM

## 2024-07-17 PROBLEM — N93.9 ABNORMAL UTERINE BLEEDING: Status: RESOLVED | Noted: 2019-04-04 | Resolved: 2024-07-17

## 2024-07-17 PROBLEM — Z09 POSTOP CHECK: Status: RESOLVED | Noted: 2019-04-25 | Resolved: 2024-07-17

## 2024-07-17 PROBLEM — S83.241D TEAR OF MEDIAL MENISCUS OF RIGHT KNEE, UNSPECIFIED TEAR TYPE, UNSPECIFIED WHETHER OLD OR CURRENT TEAR, SUBSEQUENT ENCOUNTER: Status: ACTIVE | Noted: 2024-07-17

## 2024-07-17 PROBLEM — D64.9 ABSOLUTE ANEMIA: Status: RESOLVED | Noted: 2019-03-31 | Resolved: 2024-07-17

## 2024-07-17 PROCEDURE — G0476 HPV COMBO ASSAY CA SCREEN: HCPCS | Performed by: NURSE PRACTITIONER

## 2024-07-17 PROCEDURE — 99386 PREV VISIT NEW AGE 40-64: CPT | Performed by: NURSE PRACTITIONER

## 2024-07-17 PROCEDURE — G0145 SCR C/V CYTO,THINLAYER,RESCR: HCPCS | Performed by: NURSE PRACTITIONER

## 2024-07-17 RX ORDER — CHLORHEXIDINE GLUCONATE 40 MG/ML
SOLUTION TOPICAL DAILY PRN
OUTPATIENT
Start: 2024-07-17

## 2024-07-17 RX ORDER — CHLORHEXIDINE GLUCONATE ORAL RINSE 1.2 MG/ML
15 SOLUTION DENTAL ONCE
OUTPATIENT
Start: 2024-07-17 | End: 2024-07-17

## 2024-07-17 NOTE — PATIENT INSTRUCTIONS
Patient Education     Lowering Your Risk of Breast Cancer   About this topic   Breast cancer is a serious illness. Breast cancer is when abnormal cells grow and divide more quickly in your breast. These cells form a growth or tumor. The abnormal cells may enter nearby tissue and spread to other parts of the body. It is the type of cancer most often seen in women. Men can have breast cancer, but it is a rare condition.  General   Some things in your life may increase your risk of breast cancer. You may not be able to change some of these. Others you can control.  You are more likely to get breast cancer if you:  Have a mother, sister, or daughter who has had breast cancer  Have used hormones for menopause for more than 5 years  Have had radiation therapy to the breast or chest in the past  Are overweight or do not exercise  Had your first menstrual period before you were 11 years old  Went through menopause after age 53  Have never been pregnant or had your first child after age 35  Have had breast cancer before  Drink alcohol in any form  Have dense breasts  Are older in age  There is no certain way to prevent breast cancer. There are things you can do to lower your chances of having breast cancer.  Keep a healthy weight. Lose weight if you are overweight. Being overweight raises your chances of having breast cancer.  Eat a healthy diet to maintain a healthy weight, such as more fruits, vegetables, and lean cuts of meat. Decrease the amount of saturated fat in your diet.  Exercise. Being active helps you keep a healthy weight.  Limit your alcohol intake or do not drink alcohol. The more alcohol you drink, the higher your risk.  Do not smoke cigarettes. Smoking can increase your risk of many types of cancer.  Breastfeed your baby. This may help protect you. The longer you breastfeed, the more protection you have.  Talk with your doctor about:  Limiting or stopping hormone therapy.  Taking certain drugs to prevent  breast cancer. For women at high risk of having breast cancer, there are a few drugs that may lower your risk.  Surgery to prevent you from having breast cancer if you are very high risk.  When do I need to call the doctor?   Changes in your breasts  A lump or area in your breast that feels different  Discharge from your nipple  Skin on your breast is dimpled or indented  You have questions or concerns about your breasts  Helpful tips   Talk to your doctor about the best kind of breast cancer screening for you.  If you want to do self breast exams, have your doctor show you the right way to do them.  Tell your doctor of any abnormal finding.  Last Reviewed Date   2021-10-04  Consumer Information Use and Disclaimer   This generalized information is a limited summary of diagnosis, treatment, and/or medication information. It is not meant to be comprehensive and should be used as a tool to help the user understand and/or assess potential diagnostic and treatment options. It does NOT include all information about conditions, treatments, medications, side effects, or risks that may apply to a specific patient. It is not intended to be medical advice or a substitute for the medical advice, diagnosis, or treatment of a health care provider based on the health care provider's examination and assessment of a patient’s specific and unique circumstances. Patients must speak with a health care provider for complete information about their health, medical questions, and treatment options, including any risks or benefits regarding use of medications. This information does not endorse any treatments or medications as safe, effective, or approved for treating a specific patient. UpToDate, Inc. and its affiliates disclaim any warranty or liability relating to this information or the use thereof. The use of this information is governed by the Terms of Use, available at  https://www.[employer] Gamesuwer.com/en/know/clinical-effectiveness-terms   Copyright   Copyright © 2024 UpToDate, Inc. and its affiliates and/or licensors. All rights reserved.

## 2024-07-17 NOTE — PROGRESS NOTES
VIKI MOORE received referral for patient from SANTOS Shelby, for food insecurity. Per chart review, patient reports that she is on Medicaid and is unable to obtian food stamps. Patient reports her total income does not allow her to shop and obtain food. Patient was provided with food pantry list in [facility] and was given 211 number. VIKI referral was made for further assistance. Patient does not have any previous OP CM outreach.    VIKI MOORE placed initial outeach call to patient, but was unable to leave a voicemail due to the voicemail box being full. VIKI CM to attempt second outreach within one week if return call is not received prior.

## 2024-07-17 NOTE — PROGRESS NOTES
Diagnoses and all orders for this visit:    Encounter for gynecological examination without abnormal finding    Pelvic pain  -     US pelvis complete w transvaginal; Future    Screening for cervical cancer  -     Ambulatory referral to Obstetrics / Gynecology  -     Liquid-based pap, screening    Screening mammogram for breast cancer  -     Mammo screening bilateral w 3d & cad; Future      Calcium/vit d inclusion in the diet discussed, call with any issues, SBE reinforced, all concerns addressed.           Pleasant 49 y.o. NP premenopausal female here for annual exam. She denies any issues with heavy bleeding or her menses since her ablation in 2019 by Dr Vo. She is unsure if she has a history of abnormal pap smears. Last Pap and HPV tests were done on 04/04/2019 neg/neg. A pap with cotest was done today. She denies vaginal issues but does have itching off and on, not today. She has been having RLQ pelvic pain for the past week. She denies dyspareunia. Tubal for her BCM. She is sexually active without any concerns. Last mammogram was done on 04/03/2019 normal, will be scheduling it. Colonoscopy done 2015, getting one on Saturday. Every day smoker.    Past Medical History:   Diagnosis Date    Anemia     Arthritis     Bronchitis     Chronic pain disorder     L ankle    History of transfusion     no adverse reactions- 04/01/2019 last one    Hypertension     being monitored by PCP     Pneumonia     jan/feb 2019    Wears glasses      Past Surgical History:   Procedure Laterality Date    ANKLE ARTHROSCOPY      had 4 ankle surgeries on L foot; 2010; Oct 2017; Feb 2018    ANKLE LIGAMENT RECONSTRUCTION Right     BREAST BIOPSY Left 2016    clip    CHOLECYSTECTOMY      COLONOSCOPY      DILATION AND CURETTAGE OF UTERUS WITH HYSTEROSOCPY N/A 4/15/2019    Procedure: D&C, HYSTEROSCOPY;  Surgeon: Luis Antonio Vo MD;  Location: [facility];  Service: Gynecology    HEEL SPUR EXCISION Right 2007    SC HYSTEROSCOPY  ENDOMETRIAL ABLATION N/A 4/15/2019    Procedure: NOVASURE ABLATION;  Surgeon: Luis Antonio Vo MD;  Location: [facility];  Service: Gynecology    TUBAL LIGATION       Family History   Problem Relation Age of Onset    Cancer Father     Breast cancer Paternal Grandmother 44    Breast cancer Paternal Aunt 50    Breast cancer Cousin 26     Social History     Tobacco Use    Smoking status: Every Day     Current packs/day: 0.50     Average packs/day: 0.5 packs/day for 15.0 years (7.5 ttl pk-yrs)     Types: Cigarettes     Start date: 7/2/2009    Smokeless tobacco: Never   Vaping Use    Vaping status: Never Used   Substance Use Topics    Alcohol use: Yes     Alcohol/week: 2.0 standard drinks of alcohol     Types: 1 Glasses of wine, 1 Shots of liquor per week     Comment: weekends    Drug use: No       Current Outpatient Medications:     Blood Glucose Monitoring Suppl (OneTouch Verio Reflect) w/Device KIT, Check blood sugars twice daily. Please substitute with appropriate alternative as covered by patient's insurance. Dx: E11.65, Disp: 1 kit, Rfl: 0    ergocalciferol (VITAMIN D2) 50,000 units, Take 1 capsule (50,000 Units total) by mouth once a week, Disp: 12 capsule, Rfl: 0    glucose blood (OneTouch Verio) test strip, Check blood sugars twice daily. Please substitute with appropriate alternative as covered by patient's insurance. Dx: E11.65, Disp: 200 each, Rfl: 3    losartan (COZAAR) 25 mg tablet, Take 1 tablet (25 mg total) by mouth daily, Disp: 30 tablet, Rfl: 0    meloxicam (Mobic) 15 mg tablet, Take 1 tablet (15 mg total) by mouth daily for 28 days, Disp: 28 tablet, Rfl: 0    metFORMIN (GLUCOPHAGE) 500 mg tablet, Take 1 tablet (500 mg total) by mouth 2 (two) times a day with meals, Disp: 60 tablet, Rfl: 0    OneTouch Delica Lancets 33G MISC, Check blood sugars twice daily. Please substitute with appropriate alternative as covered by patient's insurance. Dx: E11.65, Disp: 200 each, Rfl: 3  Patient Active Problem  "List    Diagnosis Date Noted    Type 2 diabetes mellitus without complication, without long-term current use of insulin ([facility]) 2024    Vitamin D deficiency 2024    Food insecurity 2024    Morbid obesity ([facility]) 2024    Chronic fatigue 2024    S/P endometrial ablation 04/15/2019       No Known Allergies    OB History    Para Term  AB Living   2 2 2     2   SAB IAB Ectopic Multiple Live Births                  # Outcome Date GA Lbr Ty/2nd Weight Sex Type Anes PTL Lv   2 Term            1 Term              2 daughters   3 grand sons  On disability for her ankles, needs a bone transplant      Vitals:    24 1533   BP: 132/84   Weight: 114 kg (251 lb)   Height: 5' 4\" (1.626 m)     Body mass index is 43.08 kg/m².  Patient's last menstrual period was 2024.    Review of Systems   Constitutional: Negative for chills, fatigue, fever and unexpected weight change.   Respiratory: Negative for shortness of breath.    Gastrointestinal: Negative for anal bleeding, blood in stool, constipation and diarrhea.   Genitourinary: Negative for difficulty urinating, dysuria and hematuria.     Physical Exam   Constitutional: She appears well-developed and well-nourished. No distress.   HENT: atraumatic, EOMI  Head: Normocephalic.   Neck: Normal range of motion. Neck supple.   Pulmonary: Effort normal.  Breasts: bilateral without masses, skin changes or nipple discharge. Bilaterally soft and warm to touch. No areas of erythema or pain.  Abdominal: Soft.   Pelvic exam was performed with patient supine. No labial fusion. There is no rash, tenderness, lesion or injury on the right labia. There is no rash, tenderness, lesion or injury on the left labia. Urethral meatus does not show any tenderness, inflammation or discharge. Palpation of midline bladder without pain or discomfort.Uterus is not deviated, not enlarged, not fixed and not tender. Cervix exhibits no motion tenderness, no discharge " and no friability. Right adnexum displays no mass, no tenderness and no fullness. Left adnexum displays no mass, no tenderness and no fullness. LIMITED EXAM. No erythema or tenderness in the vagina. No foreign body in the vagina. No signs of injury around the vagina. No vaginal discharge found. No signs of injury around the vagina or anus. Perineum without lesions, signs of injury, erythema or swelling.  Lymphadenopathy:        Right: No inguinal adenopathy present.        Left: No inguinal adenopathy present.

## 2024-07-19 ENCOUNTER — PATIENT OUTREACH (OUTPATIENT)
Dept: CASE MANAGEMENT | Facility: OTHER | Age: 49
End: 2024-07-19

## 2024-07-19 NOTE — PROGRESS NOTES
VIKI MOORE placed second outreach call to patient, but was unable to leave a voicemail due to the voicemail box being full. VIKI MOORE to send Unable to Reach letter to patient's MyChart.    VIKI MOORE to close referral due to unable to make contact.

## 2024-07-19 NOTE — LETTER
07/19/24    Dear Diamond Banegas,    I am a Care Manager with [employer] [place] [id number] [facility] [id number] [phone].  We have made several attempts to call you by phone.  It is important that you contact us back at [phone] so that we can assist with your care needs.     Sincerely,         Mary Alfonso) Luana  Outpatient Social Work Care Manager

## 2024-07-20 ENCOUNTER — ANESTHESIA (OUTPATIENT)
Dept: GASTROENTEROLOGY | Facility: HOSPITAL | Age: 49
End: 2024-07-20

## 2024-07-20 ENCOUNTER — HOSPITAL ENCOUNTER (OUTPATIENT)
Dept: GASTROENTEROLOGY | Facility: HOSPITAL | Age: 49
Setting detail: OUTPATIENT SURGERY
Discharge: HOME | End: 2024-07-20
Attending: INTERNAL MEDICINE
Payer: COMMERCIAL

## 2024-07-20 ENCOUNTER — ANESTHESIA EVENT (OUTPATIENT)
Dept: GASTROENTEROLOGY | Facility: HOSPITAL | Age: 49
End: 2024-07-20

## 2024-07-20 VITALS
RESPIRATION RATE: 20 BRPM | WEIGHT: 249.12 LBS | BODY MASS INDEX: 42.53 KG/M2 | HEIGHT: 64 IN | SYSTOLIC BLOOD PRESSURE: 119 MMHG | TEMPERATURE: 97.5 F | HEART RATE: 73 BPM | DIASTOLIC BLOOD PRESSURE: 65 MMHG | OXYGEN SATURATION: 98 %

## 2024-07-20 DIAGNOSIS — Z86.010 HISTORY OF COLON POLYPS: ICD-10-CM

## 2024-07-20 DIAGNOSIS — K62.5 RECTAL BLEEDING: ICD-10-CM

## 2024-07-20 DIAGNOSIS — R19.8 ALTERNATING CONSTIPATION AND DIARRHEA: ICD-10-CM

## 2024-07-20 DIAGNOSIS — R15.9 FULL INCONTINENCE OF FECES: ICD-10-CM

## 2024-07-20 LAB
EXT PREGNANCY TEST URINE: NEGATIVE
EXT. CONTROL: NORMAL
GLUCOSE SERPL-MCNC: 166 MG/DL (ref 65–140)

## 2024-07-20 PROCEDURE — 45380 COLONOSCOPY AND BIOPSY: CPT | Performed by: INTERNAL MEDICINE

## 2024-07-20 PROCEDURE — 88305 TISSUE EXAM BY PATHOLOGIST: CPT | Performed by: PATHOLOGY

## 2024-07-20 PROCEDURE — 82948 REAGENT STRIP/BLOOD GLUCOSE: CPT

## 2024-07-20 PROCEDURE — 81025 URINE PREGNANCY TEST: CPT | Performed by: ANESTHESIOLOGY

## 2024-07-20 RX ORDER — LIDOCAINE HYDROCHLORIDE 10 MG/ML
INJECTION, SOLUTION EPIDURAL; INFILTRATION; INTRACAUDAL; PERINEURAL AS NEEDED
Status: DISCONTINUED | OUTPATIENT
Start: 2024-07-20 | End: 2024-07-20

## 2024-07-20 RX ORDER — PROPOFOL 10 MG/ML
INJECTION, EMULSION INTRAVENOUS AS NEEDED
Status: DISCONTINUED | OUTPATIENT
Start: 2024-07-20 | End: 2024-07-20

## 2024-07-20 RX ORDER — SODIUM CHLORIDE, SODIUM LACTATE, POTASSIUM CHLORIDE, CALCIUM CHLORIDE 600; 310; 30; 20 MG/100ML; MG/100ML; MG/100ML; MG/100ML
125 INJECTION, SOLUTION INTRAVENOUS CONTINUOUS
Status: DISCONTINUED | OUTPATIENT
Start: 2024-07-20 | End: 2024-07-24 | Stop reason: HOSPADM

## 2024-07-20 RX ORDER — SODIUM CHLORIDE, SODIUM LACTATE, POTASSIUM CHLORIDE, CALCIUM CHLORIDE 600; 310; 30; 20 MG/100ML; MG/100ML; MG/100ML; MG/100ML
INJECTION, SOLUTION INTRAVENOUS CONTINUOUS PRN
Status: DISCONTINUED | OUTPATIENT
Start: 2024-07-20 | End: 2024-07-20

## 2024-07-20 RX ADMIN — SODIUM CHLORIDE, SODIUM LACTATE, POTASSIUM CHLORIDE, AND CALCIUM CHLORIDE 125 ML/HR: .6; .31; .03; .02 INJECTION, SOLUTION INTRAVENOUS at 08:28

## 2024-07-20 RX ADMIN — LIDOCAINE HYDROCHLORIDE 50 MG: 10 INJECTION, SOLUTION EPIDURAL; INFILTRATION; INTRACAUDAL; PERINEURAL at 09:16

## 2024-07-20 RX ADMIN — PROPOFOL 100 MG: 10 INJECTION, EMULSION INTRAVENOUS at 09:16

## 2024-07-20 RX ADMIN — PROPOFOL 30 MG: 10 INJECTION, EMULSION INTRAVENOUS at 09:26

## 2024-07-20 RX ADMIN — PROPOFOL 30 MG: 10 INJECTION, EMULSION INTRAVENOUS at 09:18

## 2024-07-20 RX ADMIN — PROPOFOL 50 MG: 10 INJECTION, EMULSION INTRAVENOUS at 09:22

## 2024-07-20 RX ADMIN — PROPOFOL 20 MG: 10 INJECTION, EMULSION INTRAVENOUS at 09:17

## 2024-07-20 RX ADMIN — SODIUM CHLORIDE, SODIUM LACTATE, POTASSIUM CHLORIDE, AND CALCIUM CHLORIDE: .6; .31; .03; .02 INJECTION, SOLUTION INTRAVENOUS at 09:11

## 2024-07-20 RX ADMIN — PROPOFOL 50 MG: 10 INJECTION, EMULSION INTRAVENOUS at 09:24

## 2024-07-20 RX ADMIN — PROPOFOL 50 MG: 10 INJECTION, EMULSION INTRAVENOUS at 09:20

## 2024-07-20 NOTE — ANESTHESIA PREPROCEDURE EVALUATION
Procedure:  COLONOSCOPY    Relevant Problems   ENDO   (+) Type 2 diabetes mellitus without complication, without long-term current use of insulin (HCC)      Obstetrics/Gynecology   (+) S/P endometrial ablation      Care Coordination   (+) Food insecurity      Rheumatology   (+) Tear of medial meniscus of right knee, unspecified tear type, unspecified whether old or current tear, subsequent encounter      Other   (+) Chronic fatigue   (+) Morbid obesity (HCC)   (+) Vitamin D deficiency        Physical Exam    Airway    Mallampati score: III  TM Distance: >3 FB  Neck ROM: full     Dental       Cardiovascular  Cardiovascular exam normal    Pulmonary  Pulmonary exam normal     Other Findings  post-pubertal.      Anesthesia Plan  ASA Score- 3     Anesthesia Type- IV sedation with anesthesia with ASA Monitors.         Additional Monitors:     Airway Plan:            Plan Factors-Exercise tolerance (METS): >4 METS.    Chart reviewed. EKG reviewed. Imaging results reviewed. Existing labs reviewed. Patient summary reviewed.    Patient is not a current smoker.              Induction- intravenous.    Postoperative Plan-         Informed Consent- Anesthetic plan and risks discussed with patient.  I personally reviewed this patient with the CRNA. Discussed and agreed on the Anesthesia Plan with the CRNA..

## 2024-07-20 NOTE — ANESTHESIA POSTPROCEDURE EVALUATION
Post-Op Assessment Note    CV Status:  Stable  Pain Score: 0    Pain management: adequate       Mental Status:  Alert and awake   Hydration Status:  Euvolemic   PONV Controlled:  Controlled   Airway Patency:  Patent     Post Op Vitals Reviewed: Yes    No anethesia notable event occurred.    Staff: CRNA               /65 (07/20/24 0932)    Temp 97.5 °F (36.4 °C) (07/20/24 0932)    Pulse 78 (07/20/24 0932)   Resp 20 (07/20/24 0932)    SpO2 94 % (07/20/24 0932)

## 2024-07-20 NOTE — INTERVAL H&P NOTE
H&P reviewed. After examining the patient I find no changes in the patients condition since the H&P had been written.    Vitals:    07/20/24 0806   BP: 139/84   Pulse: 75   Resp: 16   Temp: (!) 97 °F (36.1 °C)   SpO2: 95%

## 2024-07-23 ENCOUNTER — PREP FOR PROCEDURE (OUTPATIENT)
Dept: OBGYN CLINIC | Facility: CLINIC | Age: 49
End: 2024-07-23

## 2024-07-23 DIAGNOSIS — E11.9 TYPE 2 DIABETES MELLITUS WITHOUT COMPLICATION, WITHOUT LONG-TERM CURRENT USE OF INSULIN (HCC): Primary | ICD-10-CM

## 2024-07-23 DIAGNOSIS — S83.241D TEAR OF MEDIAL MENISCUS OF RIGHT KNEE, UNSPECIFIED TEAR TYPE, UNSPECIFIED WHETHER OLD OR CURRENT TEAR, SUBSEQUENT ENCOUNTER: ICD-10-CM

## 2024-07-23 LAB
LAB AP GYN PRIMARY INTERPRETATION: NORMAL
Lab: NORMAL

## 2024-07-23 PROCEDURE — 88305 TISSUE EXAM BY PATHOLOGIST: CPT | Performed by: PATHOLOGY

## 2024-08-01 ENCOUNTER — TELEPHONE (OUTPATIENT)
Dept: FAMILY MEDICINE CLINIC | Facility: CLINIC | Age: 49
End: 2024-08-01

## 2024-08-01 NOTE — TELEPHONE ENCOUNTER
Left message for the patient to reschedule her pre op appt. Scheduled on 8/6. Surgery is scheduled for 9/10 pre op appt needs to be with in 30 days of surgery date

## 2024-08-04 ENCOUNTER — HOSPITAL ENCOUNTER (OUTPATIENT)
Dept: ULTRASOUND IMAGING | Facility: HOSPITAL | Age: 49
Discharge: HOME | End: 2024-08-04
Payer: COMMERCIAL

## 2024-08-04 ENCOUNTER — HOSPITAL ENCOUNTER (OUTPATIENT)
Dept: RADIOLOGY | Facility: HOSPITAL | Age: 49
Discharge: HOME | End: 2024-08-04
Payer: COMMERCIAL

## 2024-08-04 DIAGNOSIS — M95.8 OSTEOCHONDRAL DEFECT OF ANKLE: ICD-10-CM

## 2024-08-04 DIAGNOSIS — R10.2 PELVIC PAIN: ICD-10-CM

## 2024-08-04 PROCEDURE — 76856 US EXAM PELVIC COMPLETE: CPT

## 2024-08-04 PROCEDURE — 76830 TRANSVAGINAL US NON-OB: CPT

## 2024-08-04 PROCEDURE — 73610 X-RAY EXAM OF ANKLE: CPT

## 2024-08-06 ENCOUNTER — OFFICE VISIT (OUTPATIENT)
Dept: FAMILY MEDICINE CLINIC | Facility: CLINIC | Age: 49
End: 2024-08-06
Payer: COMMERCIAL

## 2024-08-06 VITALS
HEIGHT: 64 IN | HEART RATE: 96 BPM | TEMPERATURE: 97.6 F | RESPIRATION RATE: 18 BRPM | OXYGEN SATURATION: 99 % | SYSTOLIC BLOOD PRESSURE: 144 MMHG | DIASTOLIC BLOOD PRESSURE: 90 MMHG | WEIGHT: 248.6 LBS | BODY MASS INDEX: 42.44 KG/M2

## 2024-08-06 DIAGNOSIS — M81.0 OSTEOPOROSIS WITHOUT CURRENT PATHOLOGICAL FRACTURE, UNSPECIFIED OSTEOPOROSIS TYPE: ICD-10-CM

## 2024-08-06 DIAGNOSIS — S83.241D TEAR OF MEDIAL MENISCUS OF RIGHT KNEE, UNSPECIFIED TEAR TYPE, UNSPECIFIED WHETHER OLD OR CURRENT TEAR, SUBSEQUENT ENCOUNTER: ICD-10-CM

## 2024-08-06 DIAGNOSIS — E11.9 TYPE 2 DIABETES MELLITUS WITHOUT COMPLICATION, WITHOUT LONG-TERM CURRENT USE OF INSULIN (HCC): ICD-10-CM

## 2024-08-06 DIAGNOSIS — E66.01 MORBID OBESITY (HCC): ICD-10-CM

## 2024-08-06 DIAGNOSIS — M95.8 OSTEOCHONDRAL DEFECT OF ANKLE: ICD-10-CM

## 2024-08-06 DIAGNOSIS — E55.9 VITAMIN D DEFICIENCY: Primary | ICD-10-CM

## 2024-08-06 DIAGNOSIS — I10 ESSENTIAL HYPERTENSION: ICD-10-CM

## 2024-08-06 LAB
LEFT EYE DIABETIC RETINOPATHY: NORMAL
LEFT EYE IMAGE QUALITY: NORMAL
LEFT EYE MACULAR EDEMA: NORMAL
LEFT EYE OTHER RETINOPATHY: NORMAL
RIGHT EYE DIABETIC RETINOPATHY: NORMAL
RIGHT EYE IMAGE QUALITY: NORMAL
RIGHT EYE MACULAR EDEMA: NORMAL
RIGHT EYE OTHER RETINOPATHY: NORMAL
SEVERITY (EYE EXAM): NORMAL

## 2024-08-06 PROCEDURE — 99214 OFFICE O/P EST MOD 30 MIN: CPT | Performed by: FAMILY MEDICINE

## 2024-08-06 RX ORDER — LOSARTAN POTASSIUM 50 MG/1
50 TABLET ORAL DAILY
Qty: 30 TABLET | Refills: 5 | Status: SHIPPED | OUTPATIENT
Start: 2024-08-06

## 2024-08-06 RX ORDER — ERGOCALCIFEROL 1.25 MG/1
50000 CAPSULE ORAL WEEKLY
Qty: 12 CAPSULE | Refills: 0 | Status: SHIPPED | OUTPATIENT
Start: 2024-08-06

## 2024-08-06 NOTE — PROGRESS NOTES
Assessment/Plan:       Problem List Items Addressed This Visit          Cardiovascular and Mediastinum    Essential hypertension     Continue losartan increase dosage up to 50 mg daily         Relevant Medications    losartan (COZAAR) 50 mg tablet       Endocrine    Type 2 diabetes mellitus without complication, without long-term current use of insulin (HCC)     A1c is 7.7 recommend more exercise through the summer diet weight reduction continuation metformin add additional medication such as SGLT 2 or DPP 4  Lab Results   Component Value Date    HGBA1C 7.7 (H) 07/12/2024            Relevant Medications    metFORMIN (GLUCOPHAGE) 500 mg tablet    Empagliflozin (JARDIANCE) 10 MG TABS tablet    Other Relevant Orders    IRIS Diabetic eye exam (Completed)    Albumin / creatinine urine ratio    Comprehensive metabolic panel    Hemoglobin A1C    TSH, 3rd generation with Free T4 reflex    Lipid Panel with Direct LDL reflex       Musculoskeletal and Integument    Tear of medial meniscus of right knee, unspecified tear type, unspecified whether old or current tear, subsequent encounter     Patient is scheduled for orthopedic surgery upcoming next month she is using a cane for ambulation assistance at this time            Other    Morbid obesity (HCC)     Diet and weight reduction recommended exercise more and reduce carbohydrate intake         Vitamin D deficiency - Primary     Recommend 5000 units daily recheck vitamin D level at next visit         Relevant Medications    ergocalciferol (VITAMIN D2) 50,000 units    Other Relevant Orders    Vitamin D 25 hydroxy     Other Visit Diagnoses       Osteoporosis without current pathological fracture, unspecified osteoporosis type        Relevant Orders    DXA bone density spine hip and pelvis    Osteochondral defect of ankle        Relevant Orders    Ambulatory Referral to Orthopedic Surgery              Subjective:      Patient ID: Diamond Banegas is a 49 y.o. female.    Follow-up  "evaluation review lab work        The following portions of the patient's history were reviewed and updated as appropriate: allergies, current medications, past family history, past medical history, past social history, past surgical history and problem list.    Review of Systems   Constitutional:  Negative for chills, fatigue and fever.   HENT:  Negative for congestion, nosebleeds, rhinorrhea, sinus pressure and sore throat.    Eyes:  Negative for discharge and redness.   Respiratory:  Negative for cough and shortness of breath.    Cardiovascular:  Negative for chest pain, palpitations and leg swelling.   Gastrointestinal:  Negative for abdominal pain, blood in stool and nausea.   Endocrine: Negative for cold intolerance, heat intolerance and polyuria.   Genitourinary:  Negative for dysuria and frequency.   Musculoskeletal:  Negative for arthralgias, back pain and myalgias.   Skin:  Negative for rash.   Neurological:  Negative for dizziness, weakness and headaches.   Hematological:  Negative for adenopathy.   Psychiatric/Behavioral:  Negative for behavioral problems and sleep disturbance. The patient is not nervous/anxious.          Objective:      /90 (BP Location: Left arm, Patient Position: Sitting, Cuff Size: Large)   Pulse 96   Temp 97.6 °F (36.4 °C) (Tympanic)   Resp 18   Ht 5' 4\" (1.626 m)   Wt 113 kg (248 lb 9.6 oz)   LMP 06/14/2024 (Exact Date)   SpO2 99%   BMI 42.67 kg/m²        Physical Exam  Vitals and nursing note reviewed.   Constitutional:       General: She is not in acute distress.     Appearance: Normal appearance. She is well-developed. She is obese.   HENT:      Head: Normocephalic and atraumatic.      Right Ear: Tympanic membrane and external ear normal.      Left Ear: Tympanic membrane and external ear normal.      Nose: Nose normal.      Mouth/Throat:      Mouth: Mucous membranes are moist.      Pharynx: Oropharynx is clear. No oropharyngeal exudate.   Eyes:      General: No " scleral icterus.        Right eye: No discharge.         Left eye: No discharge.      Conjunctiva/sclera: Conjunctivae normal.      Pupils: Pupils are equal, round, and reactive to light.   Neck:      Thyroid: No thyromegaly.      Vascular: No JVD.   Cardiovascular:      Rate and Rhythm: Normal rate and regular rhythm.      Pulses: no weak pulses.           Dorsalis pedis pulses are 2+ on the right side and 2+ on the left side.        Posterior tibial pulses are 2+ on the right side and 2+ on the left side.      Heart sounds: Normal heart sounds. No murmur heard.  Pulmonary:      Effort: Pulmonary effort is normal.      Breath sounds: No wheezing or rales.   Chest:      Chest wall: No tenderness.   Abdominal:      General: Bowel sounds are normal. There is no distension.      Palpations: Abdomen is soft. There is no mass.      Tenderness: There is no abdominal tenderness.   Musculoskeletal:         General: No tenderness or deformity. Normal range of motion.      Cervical back: Normal range of motion.   Feet:      Right foot:      Skin integrity: No ulcer, skin breakdown, erythema, warmth, callus or dry skin.      Left foot:      Skin integrity: No ulcer, skin breakdown, erythema, warmth, callus or dry skin.   Lymphadenopathy:      Cervical: No cervical adenopathy.   Skin:     General: Skin is warm and dry.      Findings: No rash.   Neurological:      General: No focal deficit present.      Mental Status: She is alert and oriented to person, place, and time.      Cranial Nerves: No cranial nerve deficit.      Coordination: Coordination normal.      Deep Tendon Reflexes: Reflexes are normal and symmetric. Reflexes normal.   Psychiatric:         Mood and Affect: Mood normal.         Behavior: Behavior normal.         Thought Content: Thought content normal.         Judgment: Judgment normal.          Data:    Laboratory Results: I have personally reviewed the pertinent laboratory results/reports   Radiology/Other  "Diagnostic Testing Results: I have personally reviewed pertinent reports.       Lab Results   Component Value Date    WBC 6.99 07/12/2024    HGB 13.2 07/12/2024    HCT 40.0 07/12/2024    MCV 86 07/12/2024     07/12/2024     Lab Results   Component Value Date     12/20/2014    K 4.1 07/12/2024     07/12/2024    CO2 28 07/12/2024    ANIONGAP 11 12/20/2014    BUN 10 07/12/2024    CREATININE 0.45 (L) 07/12/2024    GLUCOSE 108 12/20/2014    GLUF 178 (H) 07/12/2024    CALCIUM 9.3 07/12/2024    AST 18 07/12/2024    ALT 26 07/12/2024    ALKPHOS 52 07/12/2024    PROT 7.4 12/20/2014    BILITOT 0.2 12/20/2014    EGFR 116 07/12/2024     Lab Results   Component Value Date    CHOLESTEROL 174 07/12/2024     Lab Results   Component Value Date    HDL 38 (L) 07/12/2024     Lab Results   Component Value Date    LDLCALC 82 07/12/2024     Lab Results   Component Value Date    TRIG 271 (H) 07/12/2024     No results found for: \"CHOLHDL\"  Lab Results   Component Value Date    WZQ9ZPQSZMHA 0.895 07/12/2024     Lab Results   Component Value Date    HGBA1C 7.7 (H) 07/12/2024     No results found for: \"PSA\"    Akin Torres, DO    Diabetic Foot Exam    Patient's shoes and socks removed.    Right Foot/Ankle   Right Foot Inspection  Skin Exam: skin normal and skin intact. No dry skin, no warmth, no callus, no erythema, no maceration, no abnormal color, no pre-ulcer, no ulcer and no callus.     Toe Exam: ROM and strength within normal limits.     Sensory   Monofilament testing: intact    Vascular  Capillary refills: < 3 seconds  The right DP pulse is 2+. The right PT pulse is 2+.     Left Foot/Ankle  Left Foot Inspection  Skin Exam: skin normal and skin intact. No dry skin, no warmth, no erythema, no maceration, normal color, no pre-ulcer, no ulcer and no callus.     Toe Exam: ROM and strength within normal limits.     Sensory   Monofilament testing: intact    Vascular  Capillary refills: < 3 seconds  The left DP pulse is " 2+. The left PT pulse is 2+.     Assign Risk Category  No deformity present  No loss of protective sensation  No weak pulses  Risk: 0

## 2024-08-06 NOTE — ASSESSMENT & PLAN NOTE
Patient is scheduled for orthopedic surgery upcoming next month she is using a cane for ambulation assistance at this time

## 2024-08-06 NOTE — ASSESSMENT & PLAN NOTE
A1c is 7.7 recommend more exercise through the summer diet weight reduction continuation metformin add additional medication such as SGLT 2 or DPP 4  Lab Results   Component Value Date    HGBA1C 7.7 (H) 07/12/2024

## 2024-08-08 ENCOUNTER — HOSPITAL ENCOUNTER (OUTPATIENT)
Dept: BONE DENSITY | Facility: IMAGING CENTER | Age: 49
Discharge: HOME | End: 2024-08-08
Payer: COMMERCIAL

## 2024-08-08 VITALS — BODY MASS INDEX: 44.05 KG/M2 | HEIGHT: 63 IN | WEIGHT: 248.6 LBS

## 2024-08-08 DIAGNOSIS — M81.0 OSTEOPOROSIS WITHOUT CURRENT PATHOLOGICAL FRACTURE, UNSPECIFIED OSTEOPOROSIS TYPE: ICD-10-CM

## 2024-08-08 PROCEDURE — 77080 DXA BONE DENSITY AXIAL: CPT

## 2024-08-09 DIAGNOSIS — Z00.6 ENCOUNTER FOR EXAMINATION FOR NORMAL COMPARISON OR CONTROL IN CLINICAL RESEARCH PROGRAM: ICD-10-CM

## 2024-09-04 ENCOUNTER — TELEPHONE (OUTPATIENT)
Dept: OBGYN CLINIC | Facility: CLINIC | Age: 49
End: 2024-09-04

## 2024-09-04 NOTE — TELEPHONE ENCOUNTER
Attempts made to patient regarding labs, Med Clearance and md instructions. Dea hasn't been able to reach patient and I have left vm's.

## 2024-09-06 NOTE — TELEPHONE ENCOUNTER
[address] [place] [address]  [phone]      [facility] INSTRUCTIONS      Patient ID:  Melvi Martinez  [id number]  71 y.o.  1959    Admit Date: 2/19/2019    Discharge Date: 2/20/2019     Admitting Physician: Allen Villanueva MD     Discharge Physician: Sylvie Narvaez NP    Admission Diagnoses:   ACS (acute coronary syndrome) [facility]) [I24.9]    Discharge Diagnoses: Active Problems:    S/P cardiac cath (2/19/2019)      Overview: 2/19/19:  Successful  of RCA        Discharge Condition: Good    Cardiology Procedures this Admission:  Left heart catheterization with PCI    Disposition: home      Reference discharge instructions provided by nursing for diet and activity. Signed:  Sylvie Narvaez NP  2/20/2019  8:59 AM    CARDIAC CATHETERIZATION/PCI DISCHARGE INSTRUCTIONS    It is normal to feel tired the first couple days. Take it easy and follow the physicians instructions. CHECK THE CATHETER INSERTION SITE DAILY:    You may shower 24 hours after the procedure, remove the bandage during showering. Wash with soap and water and pat dry. Gentle cleaning of the site with soap and water is sufficient, cover with a dry clean dressing or bandage. Do not apply creams or powders to the area. Do not sit in a bathtub or pool of water for 7 days or until wound has completely healed. Temporary bruising and discomfort is normal and may last a few weeks. You may have a  formation of a small lump at the site which may last up to 6 weeks. CALL THE PHYSICIANS:    If the site becomes red, swollen or feels warm to the touch  If there is bleeding or drainage or if there is unusual pain at the groin or down the leg. If there is any bleeding, lie down, apply pressure or have someone apply pressure with a clean cloth until the bleeding stops.    If the bleeding continues, call 911 to be transported to the hospital.  DO NOT [address] ANYONE Patient hasn't completed her Pre Testing labs and ECG and cancelled her pre op appointment. The PAT dept and myself have tried to reach her but no return calls.    [address] YOU - CALL [phone]. ACTIVITY:    For the first 24-48 hours or as instructed by the physician:  No lifting, pushing or pulling over 10 pounds and no straining the insertion site. Do not life grocery bags or the garbage can, do not run the vacuum  or  for 7 days. Start with short walks as in the hospital and gradually increase as tolerated each day. It is recommended to walk 30 minutes 5-7 days per week. Follow your physicians instructions on activity. Avoid walking outside in extremes of heat or cold. Walk inside when it is cold and windy or hot and humid. Things to keep in mind:  No driving for at least 24 hours, or as designated by your physician. Limit the number of times you go up and down the stairs  Take rests and pace yourself with activity. Be careful and do not strain with bowel movements. MEDICATIONS:    Take all medications as prescribed  Call your physician if you have any questions  Keep an updated list of your medications with you at all times and give a list to your physician and pharmacist        SIGNS AND SYMPTOMS:    Be cautious of symptoms of angina or recurrent symptoms such as chest discomfort, unusual shortness of breath or fatigue. These could be symptoms of restenosis, a new blockage or a heart attack. If your symptoms are relieved with rest it is still recommended that you notify your physician of recurrent chest pain or discomfort. FOR CHEST PAIN or symptoms of angina not relieved with rest:  If the discomfort is not relieved with rest, and you have been prescribed Nitroglycerin, take as directed (taken under the tongue, one at a time 5 minutes apart for a total of 3 doses). If the discomfort is not relieved after the 3rd nitroglycerin, call 911. If you have not been prescribed Nitroglycerin  and your chest discomfort is not relieved with rest, call 911. AFTER CARE:    Follow up with your physician as instructed.   Follow a heart healthy diet with proper portion control, daily stress management, daily exercise, blood pressure and cholesterol control , and smoking cessation.

## 2024-09-09 DIAGNOSIS — E11.9 TYPE 2 DIABETES MELLITUS WITHOUT COMPLICATION, WITHOUT LONG-TERM CURRENT USE OF INSULIN (HCC): ICD-10-CM

## 2024-09-09 DIAGNOSIS — I10 ESSENTIAL HYPERTENSION: ICD-10-CM

## 2024-09-10 RX ORDER — LOSARTAN POTASSIUM 50 MG/1
50 TABLET ORAL DAILY
Qty: 90 TABLET | Refills: 1 | Status: SHIPPED | OUTPATIENT
Start: 2024-09-10

## 2024-09-10 RX ORDER — EMPAGLIFLOZIN 10 MG/1
10 TABLET, FILM COATED ORAL DAILY
Qty: 90 TABLET | Refills: 1 | Status: SHIPPED | OUTPATIENT
Start: 2024-09-10

## 2024-11-22 ENCOUNTER — TELEPHONE (OUTPATIENT)
Dept: FAMILY MEDICINE CLINIC | Facility: CLINIC | Age: 49
End: 2024-11-22

## 2024-11-22 NOTE — TELEPHONE ENCOUNTER
Left message to return call regarding recent blood pressure. Is patient able to check at home or can she come in for nurse visit to follow up.

## 2024-12-10 ENCOUNTER — TELEPHONE (OUTPATIENT)
Dept: FAMILY MEDICINE CLINIC | Facility: CLINIC | Age: 49
End: 2024-12-10

## 2024-12-16 DIAGNOSIS — E55.9 VITAMIN D DEFICIENCY: ICD-10-CM

## 2024-12-17 RX ORDER — ERGOCALCIFEROL 1.25 MG/1
50000 CAPSULE, LIQUID FILLED ORAL WEEKLY
Qty: 12 CAPSULE | Refills: 0 | Status: SHIPPED | OUTPATIENT
Start: 2024-12-17

## 2025-03-18 ENCOUNTER — APPOINTMENT (EMERGENCY)
Dept: RADIOLOGY | Facility: HOSPITAL | Age: 50
End: 2025-03-18
Payer: COMMERCIAL

## 2025-03-18 ENCOUNTER — HOSPITAL ENCOUNTER (EMERGENCY)
Facility: HOSPITAL | Age: 50
Discharge: HOME | End: 2025-03-19
Attending: EMERGENCY MEDICINE | Admitting: EMERGENCY MEDICINE
Payer: COMMERCIAL

## 2025-03-18 ENCOUNTER — APPOINTMENT (EMERGENCY)
Dept: CT IMAGING | Facility: HOSPITAL | Age: 50
End: 2025-03-18
Payer: COMMERCIAL

## 2025-03-18 DIAGNOSIS — M54.30 SCIATICA: Primary | ICD-10-CM

## 2025-03-18 DIAGNOSIS — M54.16 LUMBAR RADICULAR PAIN: ICD-10-CM

## 2025-03-18 PROCEDURE — 99284 EMERGENCY DEPT VISIT MOD MDM: CPT | Performed by: EMERGENCY MEDICINE

## 2025-03-18 PROCEDURE — 72131 CT LUMBAR SPINE W/O DYE: CPT

## 2025-03-18 PROCEDURE — 96372 THER/PROPH/DIAG INJ SC/IM: CPT

## 2025-03-18 PROCEDURE — 99283 EMERGENCY DEPT VISIT LOW MDM: CPT

## 2025-03-18 RX ORDER — OXYCODONE HYDROCHLORIDE 5 MG/1
5 TABLET ORAL ONCE
Refills: 0 | Status: COMPLETED | OUTPATIENT
Start: 2025-03-18 | End: 2025-03-18

## 2025-03-18 RX ORDER — OXYCODONE HYDROCHLORIDE 5 MG/1
5 TABLET ORAL EVERY 6 HOURS PRN
Qty: 10 TABLET | Refills: 0 | Status: SHIPPED | OUTPATIENT
Start: 2025-03-18 | End: 2025-03-23

## 2025-03-18 RX ORDER — METHOCARBAMOL 500 MG/1
500 TABLET, FILM COATED ORAL 3 TIMES DAILY PRN
Qty: 20 TABLET | Refills: 0 | Status: SHIPPED | OUTPATIENT
Start: 2025-03-18

## 2025-03-18 RX ORDER — ACETAMINOPHEN 325 MG/1
975 TABLET ORAL ONCE
Status: COMPLETED | OUTPATIENT
Start: 2025-03-18 | End: 2025-03-18

## 2025-03-18 RX ORDER — DIAZEPAM 10 MG/2ML
5 INJECTION, SOLUTION INTRAMUSCULAR; INTRAVENOUS ONCE
Status: COMPLETED | OUTPATIENT
Start: 2025-03-18 | End: 2025-03-18

## 2025-03-18 RX ORDER — KETOROLAC TROMETHAMINE 30 MG/ML
15 INJECTION, SOLUTION INTRAMUSCULAR; INTRAVENOUS ONCE
Status: COMPLETED | OUTPATIENT
Start: 2025-03-18 | End: 2025-03-18

## 2025-03-18 RX ADMIN — ACETAMINOPHEN 975 MG: 325 TABLET ORAL at 19:16

## 2025-03-18 RX ADMIN — OXYCODONE HYDROCHLORIDE 5 MG: 5 TABLET ORAL at 19:16

## 2025-03-18 RX ADMIN — KETOROLAC TROMETHAMINE 15 MG: 30 INJECTION, SOLUTION INTRAMUSCULAR; INTRAVENOUS at 19:20

## 2025-03-18 RX ADMIN — DIAZEPAM 5 MG: 5 INJECTION INTRAMUSCULAR; INTRAVENOUS at 19:21

## 2025-03-18 NOTE — ED PROVIDER NOTES
Time reflects when diagnosis was documented in both MDM as applicable and the Disposition within this note       Time User Action Codes Description Comment    3/18/2025 11:05 PM Fareed Cid Add [M54.30] Sciatica     3/18/2025 11:05 PM Fareed Cid Add [M54.16] Lumbar radicular pain           ED Disposition       ED Disposition   Discharge    Condition   Stable    Date/Time   Wed Mar 19, 2025 12:03 AM    Comment   Diamond aBnegas discharge to home/self care.                   Assessment & Plan       Medical Decision Making  49-year-old female presenting for evaluation of right leg pain.  Pain in the right posterior gluteal area rating down leg.  Symptoms for last 4 days.  No red flag symptoms.  Has point tenderness to the piriformis muscle on exam.  Believe symptoms are likely secondary to sciatica.  No midline L-spine tenderness.    Will treat symptomatically.    Patient with improvement but complaining of significant radicular symptoms.  Will obtain CT lumbar spine.    Will place referral for comprehensive spine.  Patient signed out to Dr. Mantilla pending CT results.     Problems Addressed:  Lumbar radicular pain: acute illness or injury  Sciatica: acute illness or injury    Amount and/or Complexity of Data Reviewed  Radiology: ordered.    Risk  OTC drugs.  Prescription drug management.        ED Course as of 03/20/25 2243   Tue Mar 18, 2025   2217 Patient notes improvement in pain.  She is resting comfortably in the stretcher       Medications   diazepam (VALIUM) injection 5 mg (5 mg Intramuscular Given 3/18/25 1921)   ketorolac (TORADOL) injection 15 mg (15 mg Intramuscular Given 3/18/25 1920)   acetaminophen (TYLENOL) tablet 975 mg (975 mg Oral Given 3/18/25 1916)   oxyCODONE (ROXICODONE) IR tablet 5 mg (5 mg Oral Given 3/18/25 1916)       ED Risk Strat Scores                            SBIRT 22yo+      Flowsheet Row Most Recent Value   Initial Alcohol Screen: US AUDIT-C     1. How often do you have a drink  containing alcohol? 0 Filed at: 03/18/2025 1820   2. How many drinks containing alcohol do you have on a typical day you are drinking?  0 Filed at: 03/18/2025 1820   3a. Male UNDER 65: How often do you have five or more drinks on one occasion? 0 Filed at: 03/18/2025 1820   3b. FEMALE Any Age, or MALE 65+: How often do you have 4 or more drinks on one occassion? 0 Filed at: 03/18/2025 1820   Audit-C Score 0 Filed at: 03/18/2025 1820   SITA: How many times in the past year have you...    Used an illegal drug or used a prescription medication for non-medical reasons? Never Filed at: 03/18/2025 1820                            History of Present Illness       Chief Complaint   Patient presents with    Hip Pain     Right sided hip pain for the last few days. No trauma.        Past Medical History:   Diagnosis Date    Anemia     Arthritis     Bronchitis     Chronic pain disorder     L ankle    Diabetes mellitus (HCC)     History of transfusion     no adverse reactions- 04/01/2019 last one    Hypertension     being monitored by PCP     Pneumonia     jan/feb 2019    Wears glasses       Past Surgical History:   Procedure Laterality Date    ANKLE ARTHROSCOPY      had 4 ankle surgeries on L foot; 2010; Oct 2017; Feb 2018    ANKLE LIGAMENT RECONSTRUCTION Right     BREAST BIOPSY Left 2016    clip    CHOLECYSTECTOMY      COLONOSCOPY      DILATION AND CURETTAGE OF UTERUS WITH HYSTEROSOCPY N/A 4/15/2019    Procedure: D&C, HYSTEROSCOPY;  Surgeon: Luis Antonio Vo MD;  Location: [facility];  Service: Gynecology    HEEL SPUR EXCISION Right 2007    MI HYSTEROSCOPY ENDOMETRIAL ABLATION N/A 4/15/2019    Procedure: NOVASURE ABLATION;  Surgeon: Luis Antonio Vo MD;  Location: [facility];  Service: Gynecology    TUBAL LIGATION        Family History   Problem Relation Age of Onset    Cancer Father     Breast cancer Paternal Grandmother 44    Breast cancer Paternal Aunt 50    Breast cancer Cousin 26      Social History     Tobacco  "Use    Smoking status: Every Day     Current packs/day: 0.50     Average packs/day: 0.5 packs/day for 15.7 years (7.9 ttl pk-yrs)     Types: Cigarettes     Start date: 7/2/2009    Smokeless tobacco: Never   Vaping Use    Vaping status: Never Used   Substance Use Topics    Alcohol use: Yes     Alcohol/week: 2.0 standard drinks of alcohol     Types: 1 Glasses of wine, 1 Shots of liquor per week     Comment: weekends    Drug use: No      E-Cigarette/Vaping    E-Cigarette Use Never User       E-Cigarette/Vaping Substances    Nicotine No     THC No     CBD No     Flavoring No     Other No     Unknown No       I have reviewed and agree with the history as documented.     Patient is a 49-year-old female who presents for evaluation of right leg pain.  Patient says that it is located in the posterior right hip/gluteal area and radiates down the leg.  Patient says she has had intermittent pain like this over the last year but this is more intense than usual.  She says it has been worse over the last 4 days.  She has had difficulty ambulating secondary to the pain.  She says that she feels like her whole \"right leg is numb.\"  She denies any new trauma to her back.  Denies any bowel bladder incontinence, urinary retention, saddle anesthesia.  Patient has point tenderness to the right gluteal area.        Review of Systems   Constitutional:  Negative for fever and unexpected weight change.   HENT:  Negative for congestion, ear pain, sore throat and trouble swallowing.    Eyes:  Negative for pain and redness.   Respiratory:  Negative for cough, chest tightness and shortness of breath.    Cardiovascular:  Negative for chest pain and leg swelling.   Gastrointestinal:  Negative for abdominal distention, abdominal pain, diarrhea and vomiting.   Endocrine: Negative for polyuria.   Genitourinary:  Negative for dysuria, hematuria, pelvic pain and vaginal bleeding.   Musculoskeletal:  Positive for arthralgias (R leg). Negative for back " pain and myalgias.   Skin:  Negative for rash.   Neurological:  Negative for dizziness, syncope, weakness, light-headedness and headaches.           Objective       ED Triage Vitals   Temperature Pulse Blood Pressure Respirations SpO2 Patient Position - Orthostatic VS   03/18/25 1818 03/18/25 1818 03/18/25 1818 03/18/25 1818 03/18/25 1818 03/18/25 2049   (!) 97 °F (36.1 °C) 88 132/78 18 96 % Lying      Temp Source Heart Rate Source BP Location FiO2 (%) Pain Score    03/18/25 1818 03/18/25 1818 03/18/25 1818 -- 03/18/25 1818    Temporal Monitor Left arm  9      Vitals      Date and Time Temp Pulse SpO2 Resp BP Pain Score FACES Pain Rating User   03/19/25 0011 -- 82 95 % 18 123/58 2 -- AF   03/18/25 2049 97.6 °F (36.4 °C) 68 95 % 18 134/61 -- -- DK   03/18/25 1950 -- -- -- -- -- 6 -- DK   03/18/25 1916 -- -- -- -- -- 9 -- DK   03/18/25 1818 97 °F (36.1 °C) 88 96 % 18 132/78 9 -- DK            Physical Exam  Vitals and nursing note reviewed.   Constitutional:       General: She is not in acute distress.     Appearance: She is well-developed.   HENT:      Head: Normocephalic and atraumatic.      Right Ear: External ear normal.      Left Ear: External ear normal.      Nose: Nose normal.      Mouth/Throat:      Mouth: Mucous membranes are moist.      Pharynx: No oropharyngeal exudate.   Eyes:      Conjunctiva/sclera: Conjunctivae normal.      Pupils: Pupils are equal, round, and reactive to light.   Cardiovascular:      Rate and Rhythm: Normal rate and regular rhythm.      Heart sounds: Normal heart sounds. No murmur heard.     No friction rub. No gallop.   Pulmonary:      Effort: Pulmonary effort is normal. No respiratory distress.      Breath sounds: Normal breath sounds. No wheezing or rales.   Abdominal:      General: There is no distension.      Palpations: Abdomen is soft.      Tenderness: There is no abdominal tenderness. There is no guarding.   Musculoskeletal:         General: Tenderness present. No swelling or  deformity. Normal range of motion.      Cervical back: Normal range of motion and neck supple.        Legs:    Lymphadenopathy:      Cervical: No cervical adenopathy.   Skin:     General: Skin is warm and dry.   Neurological:      General: No focal deficit present.      Mental Status: She is alert and oriented to person, place, and time. Mental status is at baseline.      Cranial Nerves: No cranial nerve deficit.      Sensory: No sensory deficit.      Motor: No weakness or abnormal muscle tone.      Coordination: Coordination normal.         Results Reviewed       None            CT spine lumbar without contrast   Final Interpretation by Jaiden Barker MD (03/18 9720)      Moderate to large right paracentral disc protrusion at L4-L5 resulting in at least moderate spinal canal narrowing and right subarticular recess stenosis, likely resulting in impingement of the descending right L5 nerve root. Findings are progressed    compared to CT 7/30/2022. MRI of the lumbar spine could be performed for further evaluation, if clinically indicated.      Workstation performed: [id number]             Procedures    ED Medication and Procedure Management   Prior to Admission Medications   Prescriptions Last Dose Informant Patient Reported? Taking?   Blood Glucose Monitoring Suppl (OneTouch Verio Reflect) w/Device KIT   No No   Sig: Check blood sugars twice daily. Please substitute with appropriate alternative as covered by patient's insurance. Dx: E11.65   Empagliflozin (Jardiance) 10 MG TABS tablet   No No   Sig: take 1 tablet by mouth daily   OneTouch Delica Lancets 33G MISC   No No   Sig: Check blood sugars twice daily. Please substitute with appropriate alternative as covered by patient's insurance. Dx: E11.65   ergocalciferol (VITAMIN D2) 50,000 units   No No   Sig: take 1 capsule by mouth every week   glucose blood (OneTouch Verio) test strip   No No   Sig: Check blood sugars twice daily. Please substitute with appropriate  alternative as covered by patient's insurance. Dx: E11.65   losartan (COZAAR) 50 mg tablet   No No   Sig: take 1 tablet by mouth once daily   meloxicam (Mobic) 15 mg tablet  Self No No   Sig: Take 1 tablet (15 mg total) by mouth daily for 28 days   Patient not taking: Reported on 8/6/2024   metFORMIN (GLUCOPHAGE) 500 mg tablet   No No   Sig: take 1 tablet by mouth twice a day with meals      Facility-Administered Medications: None     Discharge Medication List as of 3/19/2025 12:03 AM        START taking these medications    Details   methocarbamol (ROBAXIN) 500 mg tablet Take 1 tablet (500 mg total) by mouth 3 (three) times a day as needed for muscle spasms, Starting Tue 3/18/2025, Normal      oxyCODONE (ROXICODONE) 5 immediate release tablet Take 1 tablet (5 mg total) by mouth every 6 (six) hours as needed for severe pain for up to 5 days Max Daily Amount: 20 mg, Starting Tue 3/18/2025, Until Sun 3/23/2025 at 2359, Normal           CONTINUE these medications which have NOT CHANGED    Details   Blood Glucose Monitoring Suppl (OneTouch Verio Reflect) w/Device KIT Check blood sugars twice daily. Please substitute with appropriate alternative as covered by patient's insurance. Dx: E11.65, Normal      Empagliflozin (Jardiance) 10 MG TABS tablet take 1 tablet by mouth daily, Starting Tue 9/10/2024, Normal      ergocalciferol (VITAMIN D2) 50,000 units take 1 capsule by mouth every week, Starting Tue 12/17/2024, Normal      glucose blood (OneTouch Verio) test strip Check blood sugars twice daily. Please substitute with appropriate alternative as covered by patient's insurance. Dx: E11.65, Normal      losartan (COZAAR) 50 mg tablet take 1 tablet by mouth once daily, Starting Tue 9/10/2024, Normal      meloxicam (Mobic) 15 mg tablet Take 1 tablet (15 mg total) by mouth daily for 28 days, Starting Mon 6/17/2024, Until Tue 8/6/2024, Normal      metFORMIN (GLUCOPHAGE) 500 mg tablet take 1 tablet by mouth twice a day with  meals, Starting Tue 9/10/2024, Normal      OneTouch Delica Lancets 33G MISC Check blood sugars twice daily. Please substitute with appropriate alternative as covered by patient's insurance. Dx: E11.65, Normal             ED SEPSIS DOCUMENTATION   Time reflects when diagnosis was documented in both MDM as applicable and the Disposition within this note       Time User Action Codes Description Comment    3/18/2025 11:05 PM Fareed Cid Add [M54.30] Sciatica     3/18/2025 11:05 PM Fareed Cid Add [M54.16] Lumbar radicular pain                  Fareed Cid DO  03/20/25 8675

## 2025-03-19 ENCOUNTER — TELEPHONE (OUTPATIENT)
Dept: PHYSICAL THERAPY | Facility: OTHER | Age: 50
End: 2025-03-19

## 2025-03-19 VITALS
DIASTOLIC BLOOD PRESSURE: 58 MMHG | SYSTOLIC BLOOD PRESSURE: 123 MMHG | RESPIRATION RATE: 18 BRPM | HEART RATE: 82 BPM | BODY MASS INDEX: 44.64 KG/M2 | WEIGHT: 248 LBS | OXYGEN SATURATION: 95 % | TEMPERATURE: 97.6 F

## 2025-03-19 NOTE — DISCHARGE INSTRUCTIONS
You were given a prescription for oxycodone as well as Robaxin.  Is important that you do not drive a vehicle or operate heavy machinery after taking this medication as it can make you drowsy.

## 2025-03-24 ENCOUNTER — NURSE TRIAGE (OUTPATIENT)
Dept: PHYSICAL THERAPY | Facility: OTHER | Age: 50
End: 2025-03-24

## 2025-03-24 DIAGNOSIS — M54.50 ACUTE EXACERBATION OF CHRONIC LOW BACK PAIN: Primary | ICD-10-CM

## 2025-03-24 DIAGNOSIS — G89.29 HIP PAIN, CHRONIC, RIGHT: ICD-10-CM

## 2025-03-24 DIAGNOSIS — G89.29 ACUTE EXACERBATION OF CHRONIC LOW BACK PAIN: Primary | ICD-10-CM

## 2025-03-24 DIAGNOSIS — M25.551 HIP PAIN, CHRONIC, RIGHT: ICD-10-CM

## 2025-03-24 DIAGNOSIS — M54.41 ACUTE RIGHT-SIDED LOW BACK PAIN WITH RIGHT-SIDED SCIATICA: ICD-10-CM

## 2025-03-24 NOTE — TELEPHONE ENCOUNTER
Additional Information   Negative: Has the patient had unexplained weight loss?   Negative: Does the patient have a fever?   Negative: Is the patient experiencing urine retention?   Negative: Is the patient experiencing blood in sputum?   Negative: Has the patient experienced major trauma? (fall from height, high speed collision, direct blow to spine) and is also experiencing nausea, light-headedness, or loss of consciousness?   Negative: Is the patient experiencing acute drop foot or paralysis?   Negative: Is this a chronic condition?    Protocols used: [facility] Protocol  Patient c/o chronic hip pain. Patient diagnosed with acute Sciatica at the ED visit on 3/18/25.  Nurse completed the triage and NO RF s/s were present.  Referral entered for the [facility] site and the contact/phone number information for the office was given to the patient as well.   Patients information was sent to the preferred site and pt was made aware that clerical would be calling to schedule the evaluation appointment. Nurse encouraged her to call the site if she does not hear from clerical beforehand. Patient Agreed.  Nurse also offered a call from the  counselor d/t SBT score. Patient declined. Patient was pleasant and appropriate during this encounter.   Patient did not voice any additional questions or concerns at this time.   Patient is aware current/past complaints,  any relevant dx, additional referrals and treatment/options will be discussed at time of evaluation/consultation appointment.   Patient is in agreement with plan.    Patient was very appreciative of the f/u call and referral placement.     Nurse wished her well and the CS referral was closed.

## 2025-03-24 NOTE — TELEPHONE ENCOUNTER
"Additional Information   Negative: Is this related to a work injury?   Negative: Is this related to an MVA?   Negative: Are you currently recieving homecare services?    Background - Initial Assessment  Clinical complaint: ED visit on 03/18 due to Right Sided Hip pain that started 4 days prior ED visit. Hx of \"on and off sciatic pain since 2005. New episode radiates down into the right buttock and down the right leg. When she put her chin closer to the chest she feels pain in her lower back. Numbness and tingling in both feet and toes. NKI. Seen by Ortho for hip and knee last year 2024. Not seeing a spine specialist for this pain at the moment. Patient states pain hs been constant, worse with movements and certain positions especially turning in bed or walking. Patient described pain as aching, burning, sharp, shooting, stabbing, throbbing and dull. She was prescribed Oxycodone and muscle relaxer . CT lumbar spine done on 03/18.  Date of onset: 4 days prior ED visit (new flare up)  Frequency of pain: constant  Quality of pain: aching, burning, spasm, charley horse, dull, numb, sharp, shooting, stabbing, throbbing, and tingling.    Protocols used: [facility] Protocol    "

## 2025-04-02 ENCOUNTER — EVALUATION (OUTPATIENT)
Dept: PHYSICAL THERAPY | Age: 50
End: 2025-04-02
Payer: COMMERCIAL

## 2025-04-02 DIAGNOSIS — G89.29 HIP PAIN, CHRONIC, RIGHT: ICD-10-CM

## 2025-04-02 DIAGNOSIS — G89.29 ACUTE EXACERBATION OF CHRONIC LOW BACK PAIN: Primary | ICD-10-CM

## 2025-04-02 DIAGNOSIS — M25.551 HIP PAIN, CHRONIC, RIGHT: ICD-10-CM

## 2025-04-02 DIAGNOSIS — M54.50 ACUTE EXACERBATION OF CHRONIC LOW BACK PAIN: Primary | ICD-10-CM

## 2025-04-02 DIAGNOSIS — M54.41 ACUTE RIGHT-SIDED LOW BACK PAIN WITH RIGHT-SIDED SCIATICA: ICD-10-CM

## 2025-04-02 PROCEDURE — 97162 PT EVAL MOD COMPLEX 30 MIN: CPT | Performed by: PHYSICAL THERAPIST

## 2025-04-02 PROCEDURE — 97110 THERAPEUTIC EXERCISES: CPT | Performed by: PHYSICAL THERAPIST

## 2025-04-02 NOTE — PROGRESS NOTES
PT Evaluation     Today's date: 2025  Patient name: Diamond Banegas  : 1975  MRN: [id number]  Referring provider: Kimberly Armendariz PT  Dx:   Encounter Diagnosis     ICD-10-CM    1. Acute exacerbation of chronic low back pain  M54.50     G89.29       2. Hip pain, chronic, right  M25.551     G89.29       3. Acute right-sided low back pain with right-sided sciatica  M54.41           Start Time: 1600  Stop Time: 1700  Total time in clinic (min): 60 minutes    Assessment    Assessment details: Diamodn Banegas is a pleasant 49-year-old female who presents to PT today with acute onset of right low back pain radiating to the right lower extremity as well as a history of chronic right-sided back pain.  She has limited lumbar mobility with the greatest limitation of extension however pain reproduced with flexion.  She has sensory changes in the right lower extremity mainly in the dorsum of the foot to light touch and pinprick.  She does have weakness present in her lower extremities but are difficult to assess at this time due to comorbidities including prior ankle surgery, left ankle subchondral defect, right knee medial meniscal tear.  She has been relatively inactive and at this time does display deconditioning and generalized weakness and affected gait which she is using a simple a cane.  Due to the patient's comorbidities, at this time the patient may benefit from a trial of aquatic therapy to promote centralization of lower extremity symptoms through the use of deep water traction as well as improving mobility and unweighted environment to decrease stress through her peripheral joints.    Goals  STG 2 weeks:  1. Pt will be able to tolerate walking 5-10 min in aquatic environment.  2.  Promote centralization of right lower extremity pain to the use of deep water traction.  Long-term goals 6 to 12 weeks:  1.  Abolish right lower extremity pain.  2.  Patient will perform ADLs independently including donning doffing  socks.  3.  Patient will be able to reach overhead and place objects in cabinet without increased low back pain.  4.  Increase general strength of lower extremities to greater than 4/5 in order to improve ease of transfers.  Will 5.  Return to prior functional status including independence in all ADLs and IADLs.    Plan  Patient would benefit from: skilled physical therapy    Planned therapy interventions: aquatic therapy, neuromuscular re-education, strengthening, therapeutic activities, therapeutic exercise, graded exercise, functional ROM exercises, home exercise program and transfer training    Frequency: 2x week  Plan of Care beginning date: 4/2/2025  Plan of Care expiration date: 7/1/2025  Treatment plan discussed with: patient        Subjective Evaluation    History of Present Illness  Mechanism of injury: Pt has chronic right LBP but notes onset of severe right sided pain onset around 3/14.   Standing increased her pain. She had to bend over forward to walk. She went to ER on 3/18 and when attempting to lay down and roll, her pain increased and she had severe pain down her leg. She did receive injections in the ER and meds which helped calm down her leg spasm and pain.   Her pain is now worse with sit-stand and reaching up to put dishes away.   She has right foot numbness from 3rd toe to big toe that occurred since then but also notes hypersensitivity in foot. She has been unable to walk due to pain and is able to walk or stand < 5 min. She is unable to reach forward and put on her socks. She has been unable to get out of bed the past 2 days.   Pt did have a Ctscan that demonstrates protrusion at L4-5 impinging on the right L5 nerve.  She has left ankle issues with subchondral defect, right knee pain due to MMT.   Pt is walking with sp cane and used intermittently prior to pain onset due to chronic right buttock pain as well as knee and ankle/  Pt's primary goal is to be able decrease her pain and then be  able to be able to do her daily activities with ease.  Patient Goals  Patient goals for therapy: decreased pain  Patient goal: be able to walk  Pain  At best pain rating: 3  At worst pain rating: 10  Location: right low back radiates down posterior leg with spasms, dorsum of foot  Quality: radiating  Relieving factors: change in position  Exacerbated by: trunign in bed, reaching up, transional movements.    Social Support  Lives in: one-story house          Objective     Concurrent Complaints      Additional Special Questions  No red flags    Palpation     Additional Palpation Details  Tender right piriformis    Tenderness     Right Hip   Tenderness in the PSIS.     Neurological Testing     Sensation     Lumbar   Left   Intact: light touch and pin prick    Right   Diminished: light touch and pin prick  Paresthesia: light touch    Comments   Left light touch: diminshed lateral foot from prior surgery  Right light touch: dorsum of foot great toe to 3rd toe  Right pin prick: dorsum of foot great toe to 3rd toe    Reflexes   Left   Patellar (L4): normal (2+)  Achilles (S1): normal (2+)    Right   Patellar (L4): normal (2+)  Achilles (S1): trace (1+)    Active Range of Motion     Lumbar   Flexion: Active lumbar flexion: pain with return to stand.  WFL  Extension:  Restriction level: moderate  Left lateral flexion:  Restriction level: moderate  Right lateral flexion:  with pain Restriction level: moderate  Right rotation:  Restriction level: moderate    Joint Play     Pain: L5 and S1   L5 comments: R UPA  Mechanical Assessment    Cervical      Thoracic      Lumbar    Lying extension: sustained positions  Pain intensity: better  Pain level: decreased    Strength/Myotome Testing     Left Hip   Planes of Motion   Flexion: 5    Left Knee   Flexion: 5  Extension: 4    Right Knee   Flexion: 4-  Extension: 4 (MMT right knee)    Left Ankle/Foot   Dorsiflexion: 5  Plantar flexion: 4  Great toe extension: 5    Right Ankle/Foot    Dorsiflexion: 4-  Plantar flexion: 4-  Great toe extension: 4-    Additional Strength Details  Unable to toe walk ( pt does have prior ankle issues)    Tests     Lumbar     Left   Negative slump test.     Right   Negative slump test.     Ambulation   Weight-Bearing Status   Assistive device used: single point cane    Observational Gait   Decreased walking speed and stride length.              POC expires Unit limit Auth Expiration date PT/OT + Visit Limit?   7/1/25 BOMN 7/31/25 20                           Visit/Unit Tracking  AUTH Status:  Date 4/2              Auth needed Used 1               Remaining  19               FOTO                     Precautions: right knee MMT, left ankle subchondral defect      TherEX 4/2            Pt ed/HEP 5'            Prone lying on wedge 10'                                                   Neuro Re-Ed                                                                                                        Ther Ex                                                                                                                     Ther Activity                                       Gait Training                                       Modalities

## 2025-04-10 ENCOUNTER — APPOINTMENT (OUTPATIENT)
Dept: PHYSICAL THERAPY | Age: 50
End: 2025-04-10
Payer: COMMERCIAL

## 2025-04-14 ENCOUNTER — OFFICE VISIT (OUTPATIENT)
Dept: PHYSICAL THERAPY | Age: 50
End: 2025-04-14
Payer: COMMERCIAL

## 2025-04-14 DIAGNOSIS — M54.41 ACUTE RIGHT-SIDED LOW BACK PAIN WITH RIGHT-SIDED SCIATICA: ICD-10-CM

## 2025-04-14 DIAGNOSIS — G89.29 HIP PAIN, CHRONIC, RIGHT: ICD-10-CM

## 2025-04-14 DIAGNOSIS — M54.50 ACUTE EXACERBATION OF CHRONIC LOW BACK PAIN: Primary | ICD-10-CM

## 2025-04-14 DIAGNOSIS — M25.551 HIP PAIN, CHRONIC, RIGHT: ICD-10-CM

## 2025-04-14 DIAGNOSIS — G89.29 ACUTE EXACERBATION OF CHRONIC LOW BACK PAIN: Primary | ICD-10-CM

## 2025-04-14 PROCEDURE — 97113 AQUATIC THERAPY/EXERCISES: CPT

## 2025-04-14 PROCEDURE — 97150 GROUP THERAPEUTIC PROCEDURES: CPT

## 2025-04-14 NOTE — PROGRESS NOTES
Daily Note     Today's date: 2025  Patient name: Diamond Banegas  : 1975  MRN: [id number]  Referring provider: Kimberly Armendariz, PT  Dx:   Encounter Diagnosis     ICD-10-CM    1. Acute exacerbation of chronic low back pain  M54.50     G89.29       2. Hip pain, chronic, right  M25.551     G89.29       3. Acute right-sided low back pain with right-sided sciatica  M54.41           Start Time: 1300  Stop Time: 1400  Total time in clinic (min): 60 minutes    Subjective: Patient reports increased paint his weekend in her LB and R hip area.       Objective: See treatment diary below      Assessment: Tolerated treatment fair, difficulty in the water today, water walking increased her ankle, knee and LBP, kept patient mainly in the DW but she was having increased shoulder and neck pain form the noodle. Did well with seated LE exercises, progress as tolerated.  Patient demonstrated fatigue post treatment and would benefit from continued PT    Plan: Continue per plan of care.  Progress treatment as tolerated.      Patient seen 1:1 for 30 minutes and rest of time group setting.         POC expires Unit limit Auth Expiration date PT/OT + Visit Limit?   25 BOMN 25 20                           Visit/Unit Tracking  AUTH Status:  Date              Auth needed Used 1 2              Remaining   18              FOTO                     Precautions: right knee MMT, left ankle subchondral defect    Precautions:  Daily Treatment Diary     Date           Patient education           Water Walk (FW, BW, side) x10’  F 4' w/N          LE work at wall   DW            Hip FF/ext swing              Toe/heel  1            Hip ABD/ADD              Knee flexion & extension 1            Squats           UE noodle x3 nv            Push/pull              Rotate              Row forward & back              OH side bend            UE/Core (AROM, paddles, MB)              ABD/ADD              Horizontal  "Pec Fly              Alt. arm swing (shld flex/ext)              Push pull              Single paddle rotation           SLS (EO, EC, ball toss)            Aqua Step (FW, lat, eccentric)            Core work:              MF press (red, blue, blk)             Kickboard press (blue, red)              Row/ext w/ tubing (red, blue, blk)           Seated on bench             ankle DF/PF              March 1            ABD/ADD  1            Knee flexion/extension  1          DW TX - hang in the deep water  5' 5'            DW ABD/ADD  1            DW Bicycle  5'            DW Scissor hip flexion/extension              DW DKTC            Stretches              HS at bench 20\"x2 ea             Wall stretches              Calf stretch at wall              Other: piriformis 10\"x3 ea          Hot Tub - 10 minutes only  10               TherEX 4/2            Pt ed/HEP 5'            Prone lying on wedge 10'                                                   Neuro Re-Ed                                                                                                        Ther Ex                                                                                                                     Ther Activity                                       Gait Training                                       Modalities                                            "

## 2025-04-17 ENCOUNTER — OFFICE VISIT (OUTPATIENT)
Dept: PHYSICAL THERAPY | Age: 50
End: 2025-04-17
Payer: COMMERCIAL

## 2025-04-17 DIAGNOSIS — G89.29 ACUTE EXACERBATION OF CHRONIC LOW BACK PAIN: Primary | ICD-10-CM

## 2025-04-17 DIAGNOSIS — G89.29 HIP PAIN, CHRONIC, RIGHT: ICD-10-CM

## 2025-04-17 DIAGNOSIS — M25.551 HIP PAIN, CHRONIC, RIGHT: ICD-10-CM

## 2025-04-17 DIAGNOSIS — M54.50 ACUTE EXACERBATION OF CHRONIC LOW BACK PAIN: Primary | ICD-10-CM

## 2025-04-17 DIAGNOSIS — M54.41 ACUTE RIGHT-SIDED LOW BACK PAIN WITH RIGHT-SIDED SCIATICA: ICD-10-CM

## 2025-04-17 PROCEDURE — 97113 AQUATIC THERAPY/EXERCISES: CPT

## 2025-04-17 NOTE — PROGRESS NOTES
Daily Note     Today's date: 2025  Patient name: Diamond Banegas  : 1975  MRN: [id number]  Referring provider: Kimberly Armendariz, PT  Dx:   Encounter Diagnosis     ICD-10-CM    1. Acute exacerbation of chronic low back pain  M54.50     G89.29       2. Hip pain, chronic, right  M25.551     G89.29       3. Acute right-sided low back pain with right-sided sciatica  M54.41           Start Time: 1400  Stop Time: 1455  Total time in clinic (min): 55 minutes    Subjective: pt notes she had a lot of neck pain w/ radicular symptoms after last visit along w/ sig knee pain.       Objective: See treatment diary below  Pt seen 1:1 for 30 min and group therapy for remainder    Assessment: Tolerated treatment fair. Modified session today to help reduce pain. Small noodle used for DWTX and DW ex's which as per pt seemed to help w/ neck pain. Gentle AROM for Ue's and seated ex's for LE's. Monitor next visit.  would benefit from continued PT      Plan: Continue per plan of care.        POC expires Unit limit Auth Expiration date PT/OT + Visit Limit?   25 BOMN 25 20                           Visit/Unit Tracking  AUTH Status:  Date             Auth needed Used 1 2 3             Remaining               FOTO                     Precautions: right knee MMT, left ankle subchondral defect    Precautions:  Daily Treatment Diary     Date          Patient education           Water Walk (FW, BW, side) x10’  F 4' w/N 4' F         LE work at wall   DW            Hip FF/ext swing              Toe/heel  1 DW 1'           Hip ABD/ADD  1 DW  DW 1'           Knee flexion & extension 1            Squats           UE noodle x3 nv            Push/pull   1           Rotate   1           Row forward & back              OH side bend            UE/Core (AROM, paddles, MB)   AROM           ABD/ADD   1           Horizontal Pec Fly              Alt. arm swing (shld flex/ext)              Push  "pull   1           Single paddle rotation           SLS (EO, EC, ball toss)            Aqua Step (FW, lat, eccentric)            Core work:              MF press (red, blue, blk)             Kickboard press (blue, red)              Row/ext w/ tubing (red, blue, blk)           Seated on bench             ankle DF/PF              March 1 1           ABD/ADD  1 1           Knee flexion/extension  1 1         DW TX - hang in the deep water  5' 5' 10 1st, 10 last           DW ABD/ADD  1            DW Bicycle  5'            DW Scissor hip flexion/extension              DW DKTC            Stretches              HS at bench 20\"x2 ea             Wall stretches              Calf stretch at wall              Other: piriformis 10\"x3 ea          Hot Tub - 10 minutes only  10 10              TherEX 4/2            Pt ed/HEP 5'            Prone lying on wedge 10'                                                   Neuro Re-Ed                                                                                                        Ther Ex                                                                                                                     Ther Activity                                       Gait Training                                       Modalities                                              "

## 2025-04-23 ENCOUNTER — APPOINTMENT (OUTPATIENT)
Dept: PHYSICAL THERAPY | Age: 50
End: 2025-04-23
Payer: COMMERCIAL

## 2025-04-25 ENCOUNTER — OFFICE VISIT (OUTPATIENT)
Dept: PHYSICAL THERAPY | Age: 50
End: 2025-04-25
Payer: COMMERCIAL

## 2025-04-25 DIAGNOSIS — M54.50 ACUTE EXACERBATION OF CHRONIC LOW BACK PAIN: Primary | ICD-10-CM

## 2025-04-25 DIAGNOSIS — M54.41 ACUTE RIGHT-SIDED LOW BACK PAIN WITH RIGHT-SIDED SCIATICA: ICD-10-CM

## 2025-04-25 DIAGNOSIS — G89.29 ACUTE EXACERBATION OF CHRONIC LOW BACK PAIN: Primary | ICD-10-CM

## 2025-04-25 DIAGNOSIS — M25.551 HIP PAIN, CHRONIC, RIGHT: ICD-10-CM

## 2025-04-25 DIAGNOSIS — G89.29 HIP PAIN, CHRONIC, RIGHT: ICD-10-CM

## 2025-04-25 PROCEDURE — 97113 AQUATIC THERAPY/EXERCISES: CPT

## 2025-04-25 NOTE — PROGRESS NOTES
Daily Note     Today's date: 2025  Patient name: Diamond Banegas  : 1975  MRN: [id number]  Referring provider: Kimberly Armendariz, PT  Dx:   Encounter Diagnosis     ICD-10-CM    1. Acute exacerbation of chronic low back pain  M54.50     G89.29       2. Hip pain, chronic, right  M25.551     G89.29       3. Acute right-sided low back pain with right-sided sciatica  M54.41           Start Time: 1435  Stop Time: 1530  Total time in clinic (min): 55 minutes    Subjective: pt notes she did better after last session than first. She still had a lot of pain over the weekend.       Objective: See treatment   Pt seen 38  min and group therapy for remainder    Assessment: Tolerated treatment fair. Continued w/ modified session. Pt did better after last session. Pain continues. Pt  would benefit from continued PT      Plan: Continue per plan of care.        POC expires Unit limit Auth Expiration date PT/OT + Visit Limit?   25 BOMN 25 20                           Visit/Unit Tracking  AUTH Status:  Date            Auth needed Used 1 2 3 4            Remaining  19 18 17 16            FOTO                     Precautions: right knee MMT, left ankle subchondral defect    Precautions:  Daily Treatment Diary     Date         Patient education           Water Walk (FW, BW, side) x10’  F 4' w/N 4' F 5' F        LE work at wall   DW            Hip FF/ext swing              Toe/heel  1 DW 1' DW 1'          Hip ABD/ADD  1 DW 1' DW 1'           DW 1' DW 1'          Knee flexion & extension 1  DW 1'          Squats           UE noodle x3 nv            Push/pull   1 1          Rotate   1 1          Row forward & back              OH side bend            UE/Core (AROM, paddles, MB)   AROM AROM          ABD/ADD   1 1          Horizontal Pec Fly    1          Alt. arm swing (shld flex/ext)              Push pull   1 1          Single paddle rotation           SLS (EO, EC, ball toss)           "  Aqua Step (FW, lat, eccentric)            Core work:              MF press (red, blue, blk)             Kickboard press (blue, red)              Row/ext w/ tubing (red, blue, blk)           Seated on bench             ankle DF/PF    x5ea          March 1 1 1          ABD/ADD  1 1 1          Knee flexion/extension  1 1 1        DW TX - hang in the deep water  5' 5' 10 1st, 10 last 10 1st, 10 last          DW ABD/ADD  1            DW Bicycle  5'            DW Scissor hip flexion/extension              DW DKTC            Stretches              HS at bench 20\"x2 ea             Wall stretches              Calf stretch at wall              Other: piriformis 10\"x3 ea          Hot Tub - 10 minutes only  10 10 10             TherEX 4/2            Pt ed/HEP 5'            Prone lying on wedge 10'                                                   Neuro Re-Ed                                                                                                        Ther Ex                                                                                                                     Ther Activity                                       Gait Training                                       Modalities                                              "

## 2025-05-01 ENCOUNTER — OFFICE VISIT (OUTPATIENT)
Dept: PHYSICAL THERAPY | Age: 50
End: 2025-05-01
Payer: COMMERCIAL

## 2025-05-01 DIAGNOSIS — M54.50 ACUTE EXACERBATION OF CHRONIC LOW BACK PAIN: Primary | ICD-10-CM

## 2025-05-01 DIAGNOSIS — M54.41 ACUTE RIGHT-SIDED LOW BACK PAIN WITH RIGHT-SIDED SCIATICA: ICD-10-CM

## 2025-05-01 DIAGNOSIS — M25.551 HIP PAIN, CHRONIC, RIGHT: ICD-10-CM

## 2025-05-01 DIAGNOSIS — G89.29 HIP PAIN, CHRONIC, RIGHT: ICD-10-CM

## 2025-05-01 DIAGNOSIS — G89.29 ACUTE EXACERBATION OF CHRONIC LOW BACK PAIN: Primary | ICD-10-CM

## 2025-05-01 PROCEDURE — 97113 AQUATIC THERAPY/EXERCISES: CPT

## 2025-05-01 NOTE — PROGRESS NOTES
Daily Note     Today's date: 2025  Patient name: Diamond Banegas  : 1975  MRN: [id number]  Referring provider: Kimberly Armendariz, PT  Dx:   Encounter Diagnosis     ICD-10-CM    1. Acute exacerbation of chronic low back pain  M54.50     G89.29       2. Hip pain, chronic, right  M25.551     G89.29       3. Acute right-sided low back pain with right-sided sciatica  M54.41           Start Time: 1400  Stop Time: 1455  Total time in clinic (min): 55 minutes    Subjective: Presents to therapy noting she feels aquatic therapy is helping, but states for 3-4 hours after her session she feels she loses control of her hands, primarily on the right side. Reports having difficulty holding objects, such as a drill.       Objective: See treatment diary below      Assessment: Patient tolerated today's session well, putting forth good effort throughout. Emphasized performing all seated lower extremity strengthening within pain free range; good carryover present. Able to complete all interventions without adverse response and appropriate exercise fatigue upon leaving clinic. Patient would benefit from continued PT      Plan: Continue per plan of care.        POC expires Unit limit Auth Expiration date PT/OT + Visit Limit?   25 BOMN 25 20                           Visit/Unit Tracking  AUTH Status:  Date           Auth needed Used 1 2 3 4 5           Remaining  19 18 17 16 15           FOTO                     Precautions: right knee MMT, left ankle subchondral defect    Precautions:  Daily Treatment Diary     Date        Patient education           Water Walk (FW, BW, side) x10’  F 4' w/N 4' F 5' F 10'       LE work at wall   DW            Hip FF/ext swing              Toe/heel  1 DW 1' DW 1' DW 1'         Hip ABD/ADD  1 DW 1' DW 1' DW 1'          DW 1' DW 1' DW 1'         Knee flexion & extension 1  DW 1' DW 1'         Squats           UE noodle x3 nv            Push/pull    "1 1 1         Rotate   1 1 1         Row forward & back              OH side bend            UE/Core (AROM, paddles, MB)   AROM AROM AROM         ABD/ADD   1 1 1         Horizontal Pec Fly    1 1         Alt. arm swing (shld flex/ext)              Push pull   1 1 1         Single paddle rotation           SLS (EO, EC, ball toss)            Aqua Step (FW, lat, eccentric)            Core work:              MF press (red, blue, blk)             Kickboard press (blue, red)              Row/ext w/ tubing (red, blue, blk)           Seated on bench             ankle DF/PF    x5ea 1'         March  1 1 1 1'         ABD/ADD  1 1 1 1'         Knee flexion/extension  1 1 1 1'       DW TX - hang in the deep water  5' 5' 10 1st, 10 last 10 1st, 10 last 10 1st, 10 last         DW ABD/ADD  1            DW Bicycle  5'            DW Scissor hip flexion/extension              DW DKTC            Stretches              HS at bench 20\"x2 ea             Wall stretches              Calf stretch at wall              Other: piriformis 10\"x3 ea          Hot Tub - 10 minutes only  10 10 10 Deferred            TherEX 4/2            Pt ed/HEP 5'            Prone lying on wedge 10'                                                   Neuro Re-Ed                                                                                                        Ther Ex                                                                                                                     Ther Activity                                       Gait Training                                       Modalities                                                "

## 2025-05-09 DIAGNOSIS — E11.9 TYPE 2 DIABETES MELLITUS WITHOUT COMPLICATION, WITHOUT LONG-TERM CURRENT USE OF INSULIN (HCC): ICD-10-CM

## 2025-05-09 NOTE — TELEPHONE ENCOUNTER
Patient requires an appointment last seen here in August all her lab work needs to be done prior to that appointment.  If she is out of her medication a short dosage can be provided she needs to be followed every 6 months for diabetes

## 2025-05-25 NOTE — ED PROVIDER NOTES
History  Chief Complaint   Patient presents with    Jaw Swelling     patient states she was at urgent care for right jaw swelling  denies fevers  also c/o produtive cough  was put of antibotics for swelling  76-year-old female presenting for evaluation of right-sided facial swelling  Symptoms started 4 days ago without any inciting event or trauma  Area started at the angle of her right mandible, however reports that it is now spreading up her face to the temporal region as well as down to her neck  Patient states that initially she was having some difficulty with closing her mouth and was having some pain, however this has resolved  She currently has full range of motion of her jaw without any pain or difficulty  She was seen at urgent care 2 days ago and was started on amoxicillin, which she has been taking as prescribed  She has also been applying ice to the area  She states that the swelling has overall much improved  Pt states that while driving here she began to experience R eye "pressure", no actual pain  No change in vision  No pain with eye movements  Denies any associated ear pain, difficulty hearing or tinnitus  Denies any difficulty swallowing, throat/neck swelling, dental pain or trauma  Last saw a dentist about 1 year ago       Very minimal if any swelling noted on exam, no fullness/palpable abscess, normal TM bilaterally, normal dentition/no dental abscess/gum changes, soft floor of the mouth, normal phonation, tolerating secretions normally, no neck swelling/fullness, full neck range of motion    Given the patient's symptoms are improving on current regimen and there is no obvious swelling on exam/palpable abscess/etc , no indication for imaging at this time, recommended the patient continue antibiotics that she is already prescribed and to start taking anti-inflammatories/NSAIDs, patient already has an appointment tomorrow to follow up with her family doctor          Prior to Admission Medications   Prescriptions Last Dose Informant Patient Reported? Taking? Celecoxib (CELEBREX PO)   Yes Yes   Sig: Take 200 mg by mouth daily as needed    amoxicillin (AMOXIL) 500 MG tablet   No Yes   Sig: Take 1 tablet (500 mg total) by mouth 3 (three) times a day for 10 days   gabapentin (NEURONTIN) 300 mg capsule   Yes Yes   Sig: Take 300 mg by mouth 2 (two) times a day as needed       Facility-Administered Medications: None       Past Medical History:   Diagnosis Date    Anemia     Arthritis     Bronchitis     Chronic pain disorder     L ankle    History of transfusion     no adverse reactions- 04/01/2019 last one    Hypertension     being monitored by PCP     Pneumonia     jan/feb 2019    Wears glasses        Past Surgical History:   Procedure Laterality Date    ANKLE ARTHROSCOPY      had 4 ankle surgeries on L foot; 2010; Oct 2017; Feb 2018    ANKLE LIGAMENT RECONSTRUCTION Right     BREAST BIOPSY Left 2016    clip    CHOLECYSTECTOMY      COLONOSCOPY      DILATION AND CURETTAGE OF UTERUS WITH HYSTEROSOCPY N/A 4/15/2019    Procedure: D&C, HYSTEROSCOPY;  Surgeon: Melissa Garcia MD;  Location: [facility];  Service: Gynecology    HEEL SPUR EXCISION Right 2007    [place] HYSTEROSCOPY,W/ENDOMETRIAL ABLATION N/A 4/15/2019    Procedure: Clarine Mail;  Surgeon: Melissa Garcia MD;  Location: [facility];  Service: Gynecology    TUBAL LIGATION         Family History   Problem Relation Age of Onset    Cancer Father     Breast cancer Paternal Grandmother 40    Breast cancer Paternal Aunt 48    Breast cancer Cousin 32     I have reviewed and agree with the history as documented      Social History     Tobacco Use    Smoking status: Current Every Day Smoker     Packs/day: 0 50     Years: 15 00     Pack years: 7 50     Types: Cigarettes    Smokeless tobacco: Never Used   Substance Use Topics    Alcohol use: Yes     Drinks per session: 3 or 4     Binge frequency: Less than monthly Comment: weekends    Drug use: No        Review of Systems   Constitutional: Negative for chills and fever  HENT: Positive for facial swelling  Negative for congestion, dental problem, drooling, ear discharge, ear pain, hearing loss, mouth sores, nosebleeds, rhinorrhea, sinus pressure, sore throat, tinnitus, trouble swallowing and voice change  Respiratory: Negative for cough and shortness of breath  Cardiovascular: Negative for chest pain and leg swelling  Gastrointestinal: Negative for abdominal pain, constipation, diarrhea, nausea and vomiting  Genitourinary: Negative for dysuria, frequency, hematuria and urgency  Musculoskeletal: Negative for back pain, myalgias and neck pain  Skin: Negative for color change and rash  Allergic/Immunologic: Negative for environmental allergies and immunocompromised state  Neurological: Negative for dizziness, weakness, light-headedness, numbness and headaches  Hematological: Negative for adenopathy  Does not bruise/bleed easily  Psychiatric/Behavioral: Negative for agitation and confusion  All other systems reviewed and are negative  Physical Exam  Physical Exam   Constitutional: She is oriented to person, place, and time  She appears well-developed and well-nourished  HENT:   Head: Normocephalic and atraumatic  Right Ear: External ear normal    Left Ear: External ear normal    Nose: Nose normal    Mouth/Throat: Oropharynx is clear and moist    No pain with movement/manipulation of ear, no ttp over tragus or mastoids  B/l TMs WNL  Very minimal if any swelling to angle of R jaw/maxillary region, no fluctuance/abscess palpate, no parotid gland enlargement, normal dentition without ttp, no gum changes/abscess, normal posterior oropharynx without swelling, uvula midline, normal phonation, tolerating secretions normally, soft floor of the mouth, full neck ROM without any swelling/fullness   Eyes: Pupils are equal, round, and reactive to light  Conjunctivae and EOM are normal  Right eye exhibits no discharge  Left eye exhibits no discharge  No scleral icterus  Full range of motion, no pain with extraocularmovements, PERRLA, no conjunctival injection/changes   Neck: Normal range of motion  Neck supple  No JVD present  No tracheal deviation present  No thyromegaly present  Cardiovascular: Normal rate, regular rhythm, normal heart sounds and intact distal pulses  Pulmonary/Chest: Effort normal  No respiratory distress  Abdominal: Soft  She exhibits no distension  Musculoskeletal: She exhibits no edema or deformity  Lymphadenopathy:     She has no cervical adenopathy  Neurological: She is alert and oriented to person, place, and time  She exhibits normal muscle tone  Coordination normal    Skin: Skin is warm and dry  Psychiatric: She has a normal mood and affect  Her behavior is normal    Nursing note and vitals reviewed  Vital Signs  ED Triage Vitals [11/26/19 1713]   Temperature Pulse Respirations Blood Pressure SpO2   97 5 °F (36 4 °C) 97 18 162/58 97 %      Temp Source Heart Rate Source Patient Position - Orthostatic VS BP Location FiO2 (%)   Temporal Monitor Sitting Right arm --      Pain Score       2           Vitals:    11/26/19 1713   BP: 162/58   Pulse: 97   Patient Position - Orthostatic VS: Sitting         Visual Acuity      ED Medications  Medications   ibuprofen (MOTRIN) tablet 600 mg (600 mg Oral Given 11/26/19 1731)       Diagnostic Studies  Results Reviewed     None                 No orders to display              Procedures  Procedures       ED Course                               MDM  Number of Diagnoses or Management Options  Right facial swelling:   Diagnosis management comments: 52 yo F with reported R sided facial swelling although not appreciated on exam, seems to be improving  Unclear etiology  Recommend she continue abx as she is already taking   Instructed to start NSAIDs and to continue ice for swelling as this is helping as well  PCP f/u, return precautions discussed and pt expressed understanding with plan      Disposition  Final diagnoses:   Right facial swelling     Time reflects when diagnosis was documented in both MDM as applicable and the Disposition within this note     Time User Action Codes Description Comment    11/26/2019  5:27 PM Jessica Brownlee Add [R22 0] Right facial swelling       ED Disposition     ED Disposition Condition Date/Time Comment    Discharge Stable Tue Nov 26, 2019  5:27 PM Beryle Marie discharge to home/self care  Follow-up Information     Follow up With Specialties Details Why Contact Info    Walter Potts, [address] [place] [address]  [phone]            Discharge Medication List as of 11/26/2019  5:28 PM      CONTINUE these medications which have NOT CHANGED    Details   amoxicillin (AMOXIL) 500 MG tablet Take 1 tablet (500 mg total) by mouth 3 (three) times a day for 10 days, Starting Sun 11/24/2019, Until Wed 12/4/2019, Normal      Celecoxib (CELEBREX PO) Take 200 mg by mouth daily as needed , Historical Med      gabapentin (NEURONTIN) 300 mg capsule Take 300 mg by mouth 2 (two) times a day as needed , Historical Med           No discharge procedures on file      ED Provider  Electronically Signed by           Epifanio Quiroz DO  11/26/19 3097 urinary symptoms

## 2025-06-19 ENCOUNTER — TELEPHONE (OUTPATIENT)
Dept: FAMILY MEDICINE CLINIC | Facility: CLINIC | Age: 50
End: 2025-06-19

## 2025-06-19 NOTE — TELEPHONE ENCOUNTER
Tried to call patient to set up annual physical and to remain patient to scheduled her apt for her Mammo but got NALM

## 2025-07-30 ENCOUNTER — TELEPHONE (OUTPATIENT)
Dept: FAMILY MEDICINE CLINIC | Facility: CLINIC | Age: 50
End: 2025-07-30

## 2025-08-07 ENCOUNTER — TELEPHONE (OUTPATIENT)
Dept: FAMILY MEDICINE CLINIC | Facility: CLINIC | Age: 50
End: 2025-08-07

## 2025-08-19 ENCOUNTER — TELEPHONE (OUTPATIENT)
Dept: FAMILY MEDICINE CLINIC | Facility: CLINIC | Age: 50
End: 2025-08-19

## (undated) DEVICE — CYSTO TUBING SINGLE IRRIGATION

## (undated) DEVICE — DRAPE EQUIPMENT RF WAND

## (undated) DEVICE — SCD SEQUENTIAL COMPRESSION COMFORT SLEEVE MEDIUM KNEE LENGTH: Brand: KENDALL SCD

## (undated) DEVICE — IV EXTENSION TUBING 33 IN

## (undated) DEVICE — GLOVE PI ULTRA TOUCH SZ.7.0

## (undated) DEVICE — SPONGE 4 X 4 XRAY 16 PLY STRL LF RFD

## (undated) DEVICE — 2000CC GUARDIAN II: Brand: GUARDIAN

## (undated) DEVICE — GLOVE INDICATOR PI UNDERGLOVE SZ 7.5 BLUE

## (undated) DEVICE — PREMIUM DRY TRAY LF: Brand: MEDLINE INDUSTRIES, INC.

## (undated) DEVICE — PVC URETHRAL CATHETER: Brand: DOVER

## (undated) DEVICE — NOVASURE KIT

## (undated) DEVICE — STRL ALLENTOWN HYSTEROSCOPY PK: Brand: CARDINAL HEALTH

## (undated) DEVICE — GLOVE PI ULTRA TOUCH SZ.7.5